# Patient Record
Sex: FEMALE | Race: BLACK OR AFRICAN AMERICAN | Employment: OTHER | URBAN - METROPOLITAN AREA
[De-identification: names, ages, dates, MRNs, and addresses within clinical notes are randomized per-mention and may not be internally consistent; named-entity substitution may affect disease eponyms.]

---

## 2018-05-15 ENCOUNTER — HOSPITAL ENCOUNTER (OUTPATIENT)
Dept: CT IMAGING | Age: 78
Discharge: HOME | End: 2018-05-17
Payer: COMMERCIAL

## 2018-05-15 DIAGNOSIS — C50.911 MALIGNANT NEOPLASM OF RIGHT BREAST IN FEMALE, ESTROGEN RECEPTOR POSITIVE, UNSPECIFIED SITE OF BREAST (HCC): ICD-10-CM

## 2018-05-15 DIAGNOSIS — Z17.0 MALIGNANT NEOPLASM OF RIGHT BREAST IN FEMALE, ESTROGEN RECEPTOR POSITIVE, UNSPECIFIED SITE OF BREAST (HCC): ICD-10-CM

## 2018-05-15 PROCEDURE — 71250 CT THORAX DX C-: CPT

## 2018-05-18 ENCOUNTER — HOSPITAL ENCOUNTER (OUTPATIENT)
Dept: AUDIOLOGY | Age: 78
Discharge: HOME | End: 2018-05-18
Payer: COMMERCIAL

## 2018-05-18 PROCEDURE — 92557 COMPREHENSIVE HEARING TEST: CPT | Performed by: AUDIOLOGIST

## 2018-05-18 PROCEDURE — 92567 TYMPANOMETRY: CPT | Performed by: AUDIOLOGIST

## 2018-06-08 ENCOUNTER — HOSPITAL ENCOUNTER (OUTPATIENT)
Age: 78
Discharge: HOME | End: 2018-06-08
Payer: COMMERCIAL

## 2018-06-08 LAB
ANION GAP SERPL CALCULATED.3IONS-SCNC: 13 MMOL/L (ref 7–16)
ANISOCYTOSIS: ABNORMAL
BASOPHILS ABSOLUTE: 0.14 E9/L (ref 0–0.2)
BASOPHILS RELATIVE PERCENT: 3.5 % (ref 0–2)
BUN BLDV-MCNC: 15 MG/DL (ref 8–23)
CALCIUM SERPL-MCNC: 9.3 MG/DL (ref 8.6–10.2)
CHLORIDE BLD-SCNC: 98 MMOL/L (ref 98–107)
CHOLESTEROL, TOTAL: 105 MG/DL (ref 0–199)
CO2: 28 MMOL/L (ref 22–29)
CREAT SERPL-MCNC: 1.4 MG/DL (ref 0.5–1)
EOSINOPHILS ABSOLUTE: 0.53 E9/L (ref 0.05–0.5)
EOSINOPHILS RELATIVE PERCENT: 13.2 % (ref 0–6)
GFR AFRICAN AMERICAN: 44
GFR NON-AFRICAN AMERICAN: 44 ML/MIN/1.73
GLUCOSE BLD-MCNC: 110 MG/DL (ref 74–109)
HBA1C MFR BLD: 5.3 % (ref 4.8–5.9)
HCT VFR BLD CALC: 34.4 % (ref 34–48)
HDLC SERPL-MCNC: 34 MG/DL
HEMOGLOBIN: 11.5 G/DL (ref 11.5–15.5)
LDL CHOLESTEROL CALCULATED: 42 MG/DL (ref 0–99)
LYMPHOCYTES ABSOLUTE: 0.52 E9/L (ref 1.5–4)
LYMPHOCYTES RELATIVE PERCENT: 13.2 % (ref 20–42)
MCH RBC QN AUTO: 31.3 PG (ref 26–35)
MCHC RBC AUTO-ENTMCNC: 33.4 % (ref 32–34.5)
MCV RBC AUTO: 93.7 FL (ref 80–99.9)
MONOCYTES ABSOLUTE: 0.24 E9/L (ref 0.1–0.95)
MONOCYTES RELATIVE PERCENT: 6.1 % (ref 2–12)
NEUTROPHILS ABSOLUTE: 2.56 E9/L (ref 1.8–7.3)
NEUTROPHILS RELATIVE PERCENT: 64 % (ref 43–80)
PARATHYROID HORMONE INTACT: 34 PG/ML (ref 15–65)
PDW BLD-RTO: 16.7 FL (ref 11.5–15)
PHOSPHORUS: 3.7 MG/DL (ref 2.5–4.5)
PLATELET # BLD: 247 E9/L (ref 130–450)
PMV BLD AUTO: 9.8 FL (ref 7–12)
POLYCHROMASIA: ABNORMAL
POTASSIUM SERPL-SCNC: 4.6 MMOL/L (ref 3.5–5)
RBC # BLD: 3.67 E12/L (ref 3.5–5.5)
SODIUM BLD-SCNC: 139 MMOL/L (ref 132–146)
TRIGL SERPL-MCNC: 145 MG/DL (ref 0–149)
VITAMIN D 25-HYDROXY: 30 NG/ML (ref 30–100)
VLDLC SERPL CALC-MCNC: 29 MG/DL
WBC # BLD: 4 E9/L (ref 4.5–11.5)

## 2018-06-08 PROCEDURE — 36415 COLL VENOUS BLD VENIPUNCTURE: CPT

## 2018-06-08 PROCEDURE — 83036 HEMOGLOBIN GLYCOSYLATED A1C: CPT

## 2018-06-08 PROCEDURE — 80061 LIPID PANEL: CPT

## 2018-06-08 PROCEDURE — 80048 BASIC METABOLIC PNL TOTAL CA: CPT

## 2018-06-08 PROCEDURE — 83970 ASSAY OF PARATHORMONE: CPT

## 2018-06-08 PROCEDURE — 82306 VITAMIN D 25 HYDROXY: CPT

## 2018-06-08 PROCEDURE — 84100 ASSAY OF PHOSPHORUS: CPT

## 2018-06-08 PROCEDURE — 85025 COMPLETE CBC W/AUTO DIFF WBC: CPT

## 2018-07-06 ENCOUNTER — HOSPITAL ENCOUNTER (OUTPATIENT)
Dept: AUDIOLOGY | Age: 78
Discharge: HOME | End: 2018-07-06
Payer: COMMERCIAL

## 2018-07-06 PROCEDURE — V5160 DISPENSING FEE BINAURAL: HCPCS | Performed by: AUDIOLOGIST

## 2018-07-06 PROCEDURE — V5261 HEARING AID, DIGIT, BIN, BTE: HCPCS | Performed by: AUDIOLOGIST

## 2018-07-08 ENCOUNTER — HOSPITAL ENCOUNTER (OUTPATIENT)
Age: 78
Discharge: HOME | End: 2018-07-10
Payer: COMMERCIAL

## 2018-07-08 ENCOUNTER — HOSPITAL ENCOUNTER (OUTPATIENT)
Dept: GENERAL RADIOLOGY | Age: 78
Discharge: HOME | End: 2018-07-10
Payer: COMMERCIAL

## 2018-07-08 DIAGNOSIS — R52 PAIN: ICD-10-CM

## 2018-07-08 PROCEDURE — 73564 X-RAY EXAM KNEE 4 OR MORE: CPT

## 2018-10-30 ENCOUNTER — HOSPITAL ENCOUNTER (OUTPATIENT)
Dept: GENERAL RADIOLOGY | Age: 78
Discharge: HOME | End: 2018-11-01
Payer: COMMERCIAL

## 2018-10-30 DIAGNOSIS — Z85.3 HX OF BREAST CANCER: ICD-10-CM

## 2018-10-30 PROCEDURE — G0279 TOMOSYNTHESIS, MAMMO: HCPCS

## 2018-11-29 ENCOUNTER — HOSPITAL ENCOUNTER (OUTPATIENT)
Age: 78
Discharge: HOME | End: 2018-11-29
Payer: COMMERCIAL

## 2018-11-29 LAB
ANION GAP SERPL CALCULATED.3IONS-SCNC: 10 MMOL/L (ref 7–16)
ANISOCYTOSIS: ABNORMAL
ATYPICAL LYMPHOCYTE RELATIVE PERCENT: 0.9 % (ref 0–4)
BASOPHILS ABSOLUTE: 0.06 E9/L (ref 0–0.2)
BASOPHILS RELATIVE PERCENT: 1.7 % (ref 0–2)
BUN BLDV-MCNC: 19 MG/DL (ref 8–23)
CALCIUM SERPL-MCNC: 9.1 MG/DL (ref 8.6–10.2)
CHLORIDE BLD-SCNC: 101 MMOL/L (ref 98–107)
CO2: 29 MMOL/L (ref 22–29)
CREAT SERPL-MCNC: 1.2 MG/DL (ref 0.5–1)
EOSINOPHILS ABSOLUTE: 0.26 E9/L (ref 0.05–0.5)
EOSINOPHILS RELATIVE PERCENT: 7.8 % (ref 0–6)
GFR AFRICAN AMERICAN: 53
GFR NON-AFRICAN AMERICAN: 53 ML/MIN/1.73
GLUCOSE BLD-MCNC: 55 MG/DL (ref 74–99)
HCT VFR BLD CALC: 35.4 % (ref 34–48)
HEMOGLOBIN: 11.6 G/DL (ref 11.5–15.5)
LYMPHOCYTES ABSOLUTE: 0.96 E9/L (ref 1.5–4)
LYMPHOCYTES RELATIVE PERCENT: 27.8 % (ref 20–42)
MCH RBC QN AUTO: 31.6 PG (ref 26–35)
MCHC RBC AUTO-ENTMCNC: 32.8 % (ref 32–34.5)
MCV RBC AUTO: 96.5 FL (ref 80–99.9)
METAMYELOCYTES RELATIVE PERCENT: 0.9 % (ref 0–1)
MONOCYTES ABSOLUTE: 0.2 E9/L (ref 0.1–0.95)
MONOCYTES RELATIVE PERCENT: 6.1 % (ref 2–12)
NEUTROPHILS ABSOLUTE: 1.85 E9/L (ref 1.8–7.3)
NEUTROPHILS RELATIVE PERCENT: 54.8 % (ref 43–80)
PDW BLD-RTO: 15.3 FL (ref 11.5–15)
PHOSPHORUS: 3.8 MG/DL (ref 2.5–4.5)
PLATELET # BLD: 310 E9/L (ref 130–450)
PMV BLD AUTO: 10.2 FL (ref 7–12)
POLYCHROMASIA: ABNORMAL
POTASSIUM SERPL-SCNC: 4.5 MMOL/L (ref 3.5–5)
RBC # BLD: 3.67 E12/L (ref 3.5–5.5)
SODIUM BLD-SCNC: 140 MMOL/L (ref 132–146)
WBC # BLD: 3.3 E9/L (ref 4.5–11.5)

## 2018-11-29 PROCEDURE — 80048 BASIC METABOLIC PNL TOTAL CA: CPT

## 2018-11-29 PROCEDURE — 84100 ASSAY OF PHOSPHORUS: CPT

## 2018-11-29 PROCEDURE — 36415 COLL VENOUS BLD VENIPUNCTURE: CPT

## 2018-11-29 PROCEDURE — 85025 COMPLETE CBC W/AUTO DIFF WBC: CPT

## 2019-05-16 ENCOUNTER — HOSPITAL ENCOUNTER (OUTPATIENT)
Dept: CT IMAGING | Age: 79
Discharge: HOME | End: 2019-05-18
Payer: COMMERCIAL

## 2019-05-16 DIAGNOSIS — C50.911 MALIGNANT NEOPLASM OF RIGHT FEMALE BREAST, UNSPECIFIED ESTROGEN RECEPTOR STATUS, UNSPECIFIED SITE OF BREAST (HCC): ICD-10-CM

## 2019-05-16 PROCEDURE — 71250 CT THORAX DX C-: CPT

## 2019-06-26 ENCOUNTER — APPOINTMENT (OUTPATIENT)
Dept: GENERAL RADIOLOGY | Age: 79
DRG: 638 | End: 2019-06-26
Payer: COMMERCIAL

## 2019-06-26 ENCOUNTER — HOSPITAL ENCOUNTER (INPATIENT)
Age: 79
LOS: 1 days | Discharge: HOME | DRG: 638 | End: 2019-06-28
Attending: EMERGENCY MEDICINE | Admitting: INTERNAL MEDICINE
Payer: COMMERCIAL

## 2019-06-26 DIAGNOSIS — S70.02XA CONTUSION OF LEFT HIP, INITIAL ENCOUNTER: ICD-10-CM

## 2019-06-26 DIAGNOSIS — R09.02 HYPOXIA: ICD-10-CM

## 2019-06-26 DIAGNOSIS — E16.2 HYPOGLYCEMIA: Primary | ICD-10-CM

## 2019-06-26 LAB
ALBUMIN SERPL-MCNC: 3.4 G/DL (ref 3.5–5.2)
ALP BLD-CCNC: 78 U/L (ref 35–104)
ALT SERPL-CCNC: 16 U/L (ref 0–32)
ANION GAP SERPL CALCULATED.3IONS-SCNC: 9 MMOL/L (ref 7–16)
AST SERPL-CCNC: 23 U/L (ref 0–31)
BILIRUB SERPL-MCNC: 0.3 MG/DL (ref 0–1.2)
BUN BLDV-MCNC: 16 MG/DL (ref 8–23)
CALCIUM SERPL-MCNC: 9.1 MG/DL (ref 8.6–10.2)
CHLORIDE BLD-SCNC: 102 MMOL/L (ref 98–107)
CO2: 31 MMOL/L (ref 22–29)
CREAT SERPL-MCNC: 1.2 MG/DL (ref 0.5–1)
GFR AFRICAN AMERICAN: 53
GFR NON-AFRICAN AMERICAN: 53 ML/MIN/1.73
GLUCOSE BLD-MCNC: 129 MG/DL (ref 74–99)
HCT VFR BLD CALC: 32.3 % (ref 34–48)
HEMOGLOBIN: 10.4 G/DL (ref 11.5–15.5)
INR BLD: 1.2
MCH RBC QN AUTO: 32 PG (ref 26–35)
MCHC RBC AUTO-ENTMCNC: 32.2 % (ref 32–34.5)
MCV RBC AUTO: 99.4 FL (ref 80–99.9)
PDW BLD-RTO: 17.8 FL (ref 11.5–15)
PLATELET # BLD: 103 E9/L (ref 130–450)
PMV BLD AUTO: 10.3 FL (ref 7–12)
POTASSIUM SERPL-SCNC: 4.1 MMOL/L (ref 3.5–5)
PROTHROMBIN TIME: 14.1 SEC (ref 9.3–12.4)
RBC # BLD: 3.25 E12/L (ref 3.5–5.5)
SODIUM BLD-SCNC: 142 MMOL/L (ref 132–146)
TOTAL PROTEIN: 6.6 G/DL (ref 6.4–8.3)
TROPONIN: <0.01 NG/ML (ref 0–0.03)
WBC # BLD: 3.1 E9/L (ref 4.5–11.5)

## 2019-06-26 PROCEDURE — 93005 ELECTROCARDIOGRAM TRACING: CPT | Performed by: EMERGENCY MEDICINE

## 2019-06-26 PROCEDURE — 99285 EMERGENCY DEPT VISIT HI MDM: CPT

## 2019-06-26 PROCEDURE — 85027 COMPLETE CBC AUTOMATED: CPT

## 2019-06-26 PROCEDURE — 73564 X-RAY EXAM KNEE 4 OR MORE: CPT

## 2019-06-26 PROCEDURE — 83880 ASSAY OF NATRIURETIC PEPTIDE: CPT

## 2019-06-26 PROCEDURE — 71045 X-RAY EXAM CHEST 1 VIEW: CPT

## 2019-06-26 PROCEDURE — 73502 X-RAY EXAM HIP UNI 2-3 VIEWS: CPT

## 2019-06-26 PROCEDURE — 80053 COMPREHEN METABOLIC PANEL: CPT

## 2019-06-26 PROCEDURE — 36415 COLL VENOUS BLD VENIPUNCTURE: CPT

## 2019-06-26 PROCEDURE — 84484 ASSAY OF TROPONIN QUANT: CPT

## 2019-06-26 PROCEDURE — 2580000003 HC RX 258: Performed by: EMERGENCY MEDICINE

## 2019-06-26 PROCEDURE — 85610 PROTHROMBIN TIME: CPT

## 2019-06-26 RX ORDER — 0.9 % SODIUM CHLORIDE 0.9 %
1000 INTRAVENOUS SOLUTION INTRAVENOUS ONCE
Status: COMPLETED | OUTPATIENT
Start: 2019-06-26 | End: 2019-06-26

## 2019-06-26 RX ADMIN — SODIUM CHLORIDE 1000 ML: 9 INJECTION, SOLUTION INTRAVENOUS at 20:30

## 2019-06-26 ASSESSMENT — PAIN DESCRIPTION - LOCATION: LOCATION: GENERALIZED

## 2019-06-26 ASSESSMENT — PAIN DESCRIPTION - PAIN TYPE: TYPE: CHRONIC PAIN

## 2019-06-27 ENCOUNTER — APPOINTMENT (OUTPATIENT)
Dept: CT IMAGING | Age: 79
DRG: 638 | End: 2019-06-27
Payer: COMMERCIAL

## 2019-06-27 PROBLEM — R09.02 HYPOXIA: Status: ACTIVE | Noted: 2019-06-27

## 2019-06-27 PROBLEM — J44.9 COPD (CHRONIC OBSTRUCTIVE PULMONARY DISEASE) (HCC): Status: ACTIVE | Noted: 2019-06-27

## 2019-06-27 LAB
BACTERIA: ABNORMAL /HPF
BILIRUBIN URINE: NEGATIVE
BLOOD, URINE: NEGATIVE
CHP ED QC CHECK: NORMAL
CLARITY: CLEAR
COLOR: YELLOW
EKG ATRIAL RATE: 64 BPM
EKG P AXIS: 74 DEGREES
EKG P-R INTERVAL: 148 MS
EKG Q-T INTERVAL: 464 MS
EKG QRS DURATION: 82 MS
EKG QTC CALCULATION (BAZETT): 478 MS
EKG R AXIS: 61 DEGREES
EKG T AXIS: 78 DEGREES
EKG VENTRICULAR RATE: 64 BPM
GLUCOSE BLD-MCNC: 149 MG/DL
GLUCOSE URINE: NEGATIVE MG/DL
HBA1C MFR BLD: 5.4 % (ref 4–5.6)
HBA1C MFR BLD: 5.6 % (ref 4–5.6)
KETONES, URINE: NEGATIVE MG/DL
LEUKOCYTE ESTERASE, URINE: ABNORMAL
METER GLUCOSE: 149 MG/DL (ref 74–99)
METER GLUCOSE: 171 MG/DL (ref 74–99)
METER GLUCOSE: 209 MG/DL (ref 74–99)
METER GLUCOSE: 219 MG/DL (ref 74–99)
METER GLUCOSE: 252 MG/DL (ref 74–99)
METER GLUCOSE: 276 MG/DL (ref 74–99)
NITRITE, URINE: NEGATIVE
PH UA: 6 (ref 5–9)
PRO-BNP: 171 PG/ML (ref 0–450)
PRO-BNP: 82 PG/ML (ref 0–450)
PROTEIN UA: NEGATIVE MG/DL
RBC UA: ABNORMAL /HPF (ref 0–2)
SPECIFIC GRAVITY UA: <=1.005 (ref 1–1.03)
UROBILINOGEN, URINE: 0.2 E.U./DL
WBC UA: ABNORMAL /HPF (ref 0–5)

## 2019-06-27 PROCEDURE — 36415 COLL VENOUS BLD VENIPUNCTURE: CPT

## 2019-06-27 PROCEDURE — 6370000000 HC RX 637 (ALT 250 FOR IP): Performed by: EMERGENCY MEDICINE

## 2019-06-27 PROCEDURE — 93010 ELECTROCARDIOGRAM REPORT: CPT | Performed by: INTERNAL MEDICINE

## 2019-06-27 PROCEDURE — 2580000003 HC RX 258: Performed by: RADIOLOGY

## 2019-06-27 PROCEDURE — 6360000002 HC RX W HCPCS: Performed by: INTERNAL MEDICINE

## 2019-06-27 PROCEDURE — 82962 GLUCOSE BLOOD TEST: CPT

## 2019-06-27 PROCEDURE — 6370000000 HC RX 637 (ALT 250 FOR IP): Performed by: INTERNAL MEDICINE

## 2019-06-27 PROCEDURE — 81001 URINALYSIS AUTO W/SCOPE: CPT

## 2019-06-27 PROCEDURE — 97166 OT EVAL MOD COMPLEX 45 MIN: CPT

## 2019-06-27 PROCEDURE — 83880 ASSAY OF NATRIURETIC PEPTIDE: CPT

## 2019-06-27 PROCEDURE — 97535 SELF CARE MNGMENT TRAINING: CPT

## 2019-06-27 PROCEDURE — 94640 AIRWAY INHALATION TREATMENT: CPT

## 2019-06-27 PROCEDURE — 71275 CT ANGIOGRAPHY CHEST: CPT

## 2019-06-27 PROCEDURE — 94664 DEMO&/EVAL PT USE INHALER: CPT

## 2019-06-27 PROCEDURE — 2580000003 HC RX 258: Performed by: INTERNAL MEDICINE

## 2019-06-27 PROCEDURE — 6360000004 HC RX CONTRAST MEDICATION: Performed by: RADIOLOGY

## 2019-06-27 PROCEDURE — 97530 THERAPEUTIC ACTIVITIES: CPT

## 2019-06-27 PROCEDURE — 2700000000 HC OXYGEN THERAPY PER DAY

## 2019-06-27 PROCEDURE — 97162 PT EVAL MOD COMPLEX 30 MIN: CPT

## 2019-06-27 PROCEDURE — 2140000000 HC CCU INTERMEDIATE R&B

## 2019-06-27 PROCEDURE — 83036 HEMOGLOBIN GLYCOSYLATED A1C: CPT

## 2019-06-27 RX ORDER — BUDESONIDE 0.5 MG/2ML
INHALANT ORAL
Status: ON HOLD | COMMUNITY
End: 2019-06-28 | Stop reason: HOSPADM

## 2019-06-27 RX ORDER — ALBUTEROL SULFATE 2.5 MG/3ML
2.5 SOLUTION RESPIRATORY (INHALATION) 4 TIMES DAILY
Status: DISCONTINUED | OUTPATIENT
Start: 2019-06-27 | End: 2019-06-27

## 2019-06-27 RX ORDER — ACETAMINOPHEN 325 MG/1
650 TABLET ORAL EVERY 4 HOURS PRN
Status: DISCONTINUED | OUTPATIENT
Start: 2019-06-27 | End: 2019-06-28 | Stop reason: HOSPADM

## 2019-06-27 RX ORDER — MINOCYCLINE HYDROCHLORIDE 50 MG/1
100 CAPSULE ORAL 2 TIMES DAILY
Status: DISCONTINUED | OUTPATIENT
Start: 2019-06-27 | End: 2019-06-28 | Stop reason: HOSPADM

## 2019-06-27 RX ORDER — MINOCYCLINE HYDROCHLORIDE 100 MG/1
100 CAPSULE ORAL 2 TIMES DAILY
Status: ON HOLD | COMMUNITY
Start: 2012-12-18 | End: 2021-01-01 | Stop reason: HOSPADM

## 2019-06-27 RX ORDER — NICOTINE POLACRILEX 4 MG
15 LOZENGE BUCCAL PRN
Status: DISCONTINUED | OUTPATIENT
Start: 2019-06-27 | End: 2019-06-27 | Stop reason: SDUPTHER

## 2019-06-27 RX ORDER — DEXTROSE MONOHYDRATE 50 MG/ML
100 INJECTION, SOLUTION INTRAVENOUS PRN
Status: DISCONTINUED | OUTPATIENT
Start: 2019-06-27 | End: 2019-06-27 | Stop reason: SDUPTHER

## 2019-06-27 RX ORDER — PANTOPRAZOLE SODIUM 40 MG/1
40 TABLET, DELAYED RELEASE ORAL DAILY
Status: DISCONTINUED | OUTPATIENT
Start: 2019-06-27 | End: 2019-06-28 | Stop reason: HOSPADM

## 2019-06-27 RX ORDER — SODIUM CHLORIDE 0.9 % (FLUSH) 0.9 %
10 SYRINGE (ML) INJECTION EVERY 12 HOURS SCHEDULED
Status: DISCONTINUED | OUTPATIENT
Start: 2019-06-27 | End: 2019-06-28 | Stop reason: HOSPADM

## 2019-06-27 RX ORDER — GABAPENTIN 400 MG/1
CAPSULE ORAL
Refills: 0 | COMMUNITY
Start: 2019-05-30

## 2019-06-27 RX ORDER — DEXTROSE MONOHYDRATE 25 G/50ML
12.5 INJECTION, SOLUTION INTRAVENOUS PRN
Status: DISCONTINUED | OUTPATIENT
Start: 2019-06-27 | End: 2019-06-27 | Stop reason: SDUPTHER

## 2019-06-27 RX ORDER — LANSOPRAZOLE 30 MG/1
CAPSULE, DELAYED RELEASE ORAL
COMMUNITY
End: 2019-07-31

## 2019-06-27 RX ORDER — GABAPENTIN 300 MG/1
300 CAPSULE ORAL 2 TIMES DAILY
Status: DISCONTINUED | OUTPATIENT
Start: 2019-06-27 | End: 2019-06-28 | Stop reason: HOSPADM

## 2019-06-27 RX ORDER — SODIUM CHLORIDE 0.9 % (FLUSH) 0.9 %
10 SYRINGE (ML) INJECTION PRN
Status: COMPLETED | OUTPATIENT
Start: 2019-06-27 | End: 2019-06-27

## 2019-06-27 RX ORDER — NICOTINE POLACRILEX 4 MG
15 LOZENGE BUCCAL PRN
Status: DISCONTINUED | OUTPATIENT
Start: 2019-06-27 | End: 2019-06-28 | Stop reason: HOSPADM

## 2019-06-27 RX ORDER — FORMOTEROL FUMARATE 20 UG/2ML
20 SOLUTION RESPIRATORY (INHALATION) EVERY 12 HOURS
Status: DISCONTINUED | OUTPATIENT
Start: 2019-06-27 | End: 2019-06-27

## 2019-06-27 RX ORDER — SODIUM CHLORIDE 0.9 % (FLUSH) 0.9 %
10 SYRINGE (ML) INJECTION PRN
Status: DISCONTINUED | OUTPATIENT
Start: 2019-06-27 | End: 2019-06-28 | Stop reason: HOSPADM

## 2019-06-27 RX ORDER — ALBUTEROL SULFATE 90 UG/1
2 AEROSOL, METERED RESPIRATORY (INHALATION) EVERY 6 HOURS PRN
Status: DISCONTINUED | OUTPATIENT
Start: 2019-06-27 | End: 2019-06-28 | Stop reason: HOSPADM

## 2019-06-27 RX ORDER — LATANOPROST 50 UG/ML
1 SOLUTION/ DROPS OPHTHALMIC EVERY EVENING
Status: DISCONTINUED | OUTPATIENT
Start: 2019-06-27 | End: 2019-06-27

## 2019-06-27 RX ORDER — BUDESONIDE 0.5 MG/2ML
500 INHALANT ORAL 2 TIMES DAILY
Status: DISCONTINUED | OUTPATIENT
Start: 2019-06-27 | End: 2019-06-27

## 2019-06-27 RX ORDER — BUDESONIDE 0.25 MG/2ML
INHALANT ORAL
Status: ON HOLD | COMMUNITY
Start: 2011-02-24 | End: 2019-06-28 | Stop reason: HOSPADM

## 2019-06-27 RX ORDER — IPRATROPIUM BROMIDE AND ALBUTEROL SULFATE 2.5; .5 MG/3ML; MG/3ML
1 SOLUTION RESPIRATORY (INHALATION)
Status: DISPENSED | OUTPATIENT
Start: 2019-06-27 | End: 2019-06-27

## 2019-06-27 RX ORDER — IPRATROPIUM BROMIDE AND ALBUTEROL SULFATE 2.5; .5 MG/3ML; MG/3ML
1 SOLUTION RESPIRATORY (INHALATION)
Status: DISCONTINUED | OUTPATIENT
Start: 2019-06-27 | End: 2019-06-28 | Stop reason: HOSPADM

## 2019-06-27 RX ORDER — CLOBETASOL PROPIONATE 0.5 MG/G
CREAM TOPICAL
COMMUNITY

## 2019-06-27 RX ORDER — BRIMONIDINE TARTRATE 2 MG/ML
1 SOLUTION/ DROPS OPHTHALMIC EVERY MORNING
Status: DISCONTINUED | OUTPATIENT
Start: 2019-06-27 | End: 2019-06-27

## 2019-06-27 RX ORDER — POLYVINYL ALCOHOL 14 MG/ML
1 SOLUTION/ DROPS OPHTHALMIC 3 TIMES DAILY
Status: DISCONTINUED | OUTPATIENT
Start: 2019-06-27 | End: 2019-06-28 | Stop reason: HOSPADM

## 2019-06-27 RX ORDER — SODIUM CHLORIDE 9 MG/ML
INJECTION, SOLUTION INTRAVENOUS CONTINUOUS
Status: DISCONTINUED | OUTPATIENT
Start: 2019-06-27 | End: 2019-06-28

## 2019-06-27 RX ORDER — OFLOXACIN 3 MG/ML
1 SOLUTION/ DROPS OPHTHALMIC
COMMUNITY
Start: 2019-03-07

## 2019-06-27 RX ORDER — DEXTROSE MONOHYDRATE 25 G/50ML
12.5 INJECTION, SOLUTION INTRAVENOUS PRN
Status: DISCONTINUED | OUTPATIENT
Start: 2019-06-27 | End: 2019-06-28 | Stop reason: HOSPADM

## 2019-06-27 RX ORDER — ONDANSETRON 2 MG/ML
4 INJECTION INTRAMUSCULAR; INTRAVENOUS EVERY 6 HOURS PRN
Status: DISCONTINUED | OUTPATIENT
Start: 2019-06-27 | End: 2019-06-28 | Stop reason: HOSPADM

## 2019-06-27 RX ORDER — DEXTROSE MONOHYDRATE 50 MG/ML
100 INJECTION, SOLUTION INTRAVENOUS PRN
Status: DISCONTINUED | OUTPATIENT
Start: 2019-06-27 | End: 2019-06-28 | Stop reason: HOSPADM

## 2019-06-27 RX ORDER — DEXAMETHASONE SODIUM PHOSPHATE 1 MG/ML
1 SOLUTION/ DROPS OPHTHALMIC
COMMUNITY
Start: 2019-03-07

## 2019-06-27 RX ORDER — IPRATROPIUM BROMIDE AND ALBUTEROL SULFATE 2.5; .5 MG/3ML; MG/3ML
1 SOLUTION RESPIRATORY (INHALATION)
Status: COMPLETED | OUTPATIENT
Start: 2019-06-27 | End: 2019-06-27

## 2019-06-27 RX ADMIN — VERAPAMIL HYDROCHLORIDE 180 MG: 180 TABLET, FILM COATED, EXTENDED RELEASE ORAL at 08:40

## 2019-06-27 RX ADMIN — Medication 10 ML: at 08:40

## 2019-06-27 RX ADMIN — INSULIN LISPRO 2 UNITS: 100 INJECTION, SOLUTION INTRAVENOUS; SUBCUTANEOUS at 12:21

## 2019-06-27 RX ADMIN — IPRATROPIUM BROMIDE AND ALBUTEROL SULFATE 1 AMPULE: .5; 3 SOLUTION RESPIRATORY (INHALATION) at 02:45

## 2019-06-27 RX ADMIN — IPRATROPIUM BROMIDE AND ALBUTEROL SULFATE 1 AMPULE: .5; 3 SOLUTION RESPIRATORY (INHALATION) at 03:00

## 2019-06-27 RX ADMIN — INSULIN LISPRO 3 UNITS: 100 INJECTION, SOLUTION INTRAVENOUS; SUBCUTANEOUS at 18:56

## 2019-06-27 RX ADMIN — IPRATROPIUM BROMIDE AND ALBUTEROL SULFATE 1 AMPULE: .5; 3 SOLUTION RESPIRATORY (INHALATION) at 02:29

## 2019-06-27 RX ADMIN — IPRATROPIUM BROMIDE AND ALBUTEROL SULFATE 1 AMPULE: .5; 3 SOLUTION RESPIRATORY (INHALATION) at 15:02

## 2019-06-27 RX ADMIN — Medication 10 ML: at 02:04

## 2019-06-27 RX ADMIN — MINOCYCLINE HYDROCHLORIDE 100 MG: 50 CAPSULE ORAL at 20:10

## 2019-06-27 RX ADMIN — PANTOPRAZOLE SODIUM 40 MG: 40 TABLET, DELAYED RELEASE ORAL at 08:40

## 2019-06-27 RX ADMIN — INSULIN LISPRO 1 UNITS: 100 INJECTION, SOLUTION INTRAVENOUS; SUBCUTANEOUS at 20:16

## 2019-06-27 RX ADMIN — GABAPENTIN 300 MG: 300 CAPSULE ORAL at 20:10

## 2019-06-27 RX ADMIN — VERAPAMIL HYDROCHLORIDE 180 MG: 180 TABLET, FILM COATED, EXTENDED RELEASE ORAL at 20:09

## 2019-06-27 RX ADMIN — ALBUTEROL SULFATE 2.5 MG: 2.5 SOLUTION RESPIRATORY (INHALATION) at 15:01

## 2019-06-27 RX ADMIN — GABAPENTIN 300 MG: 300 CAPSULE ORAL at 08:40

## 2019-06-27 RX ADMIN — MINOCYCLINE HYDROCHLORIDE 100 MG: 50 CAPSULE ORAL at 08:40

## 2019-06-27 RX ADMIN — SODIUM CHLORIDE 1 DROP: 20 SOLUTION OPHTHALMIC at 20:09

## 2019-06-27 RX ADMIN — IOPAMIDOL 60 ML: 755 INJECTION, SOLUTION INTRAVENOUS at 02:04

## 2019-06-27 RX ADMIN — SODIUM CHLORIDE: 9 INJECTION, SOLUTION INTRAVENOUS at 06:50

## 2019-06-27 RX ADMIN — MOMETASONE FUROATE AND FORMOTEROL FUMARATE DIHYDRATE 2 PUFF: 200; 5 AEROSOL RESPIRATORY (INHALATION) at 20:09

## 2019-06-27 RX ADMIN — ENOXAPARIN SODIUM 40 MG: 40 INJECTION SUBCUTANEOUS at 08:40

## 2019-06-27 RX ADMIN — SODIUM CHLORIDE: 9 INJECTION, SOLUTION INTRAVENOUS at 20:11

## 2019-06-27 RX ADMIN — IPRATROPIUM BROMIDE AND ALBUTEROL SULFATE 1 AMPULE: .5; 3 SOLUTION RESPIRATORY (INHALATION) at 20:52

## 2019-06-27 ASSESSMENT — PAIN SCALES - GENERAL
PAINLEVEL_OUTOF10: 0

## 2019-06-27 NOTE — PROGRESS NOTES
Occupational Therapy  OCCUPATIONAL THERAPY INITIAL EVALUATION      Date:2019  Patient Name: Darin Sharma  MRN: [id number]  : 1940  Room: [address] [place] [address]    Evaluating OT: Sarahi Merino OTR/L #[id number]     AM-PAC Daily Activity Raw Score:     Recommended Adaptive Equipment:  TBD     Diagnosis: Hypoxia    Patient presented to ED for hypoglycemia     Pertinent Medical History: CA, arthritis, COPD, DM, HTN, Neuropathy, CVA     Precautions:  Falls, chemo precautions, O2, continuous pulse ox     Home Living: Pt lives with daughter in a 1 story home with 1 ROBERTA and 1 hand rail   Bathroom setup: tub/shower combo with seat   Equipment owned: rollator, shower chair    Prior Level of Function: mod I with ADLs , assist prn with IADLs; ambulated with rollator;  Pt has [employer] aides 2x/wk who assist prn  Driving: no  Occupation: na    Pain Level: Pt denied pain initially then reported L LE pain with activity (did not quantify); Therapist facilitated repositioning to address pain    Cognition: A&O: 4/4; Follows 2 step directions   Memory:  good   Sequencing:  fair   Problem solving:  fair   Judgement/safety:  fair     Functional Assessment:   Initial Eval Status  Date: 19 Treatment Status  Date: Short Term Goals  Treatment frequency: PRN   Feeding Independent       Grooming Minimal Assist   Supervision    UB Dressing Stand by Assist   Supervision    LB Dressing Maximal Assist   Minimal Assist    Bathing Maximal Assist  Minimal Assist    Toileting Moderate Assist   Assist with steadying, hygiene and clothing management   Minimal Assist    Bed Mobility  Supine to sit: Moderate Assist   Sit to supine: NT  Supine to sit: Stand by Assist   Sit to supine: Stand by Assist   Functional Transfers Moderate Assist   (bed)  Minimal Assist   (chair/ toilet with grab bars)  Stand by Assist    Functional Mobility Moderate Assist   Ambulated to/from bathroom with w/w - cuing on posture, w/w management and safety.   Pt leaning to L side- required therapist assist to correct LOBs (+ SOB; 93%)  Contact Guard Assist    Balance Sitting:     Static:  Supervision    Dynamic:SBA  Standing: min A     Activity Tolerance P+  (+ SOB with minimal activity)  F+   Visual/  Perceptual Glasses: yes  wfl                  Hand dominance: right      Strength ROM Additional Info:    RUE   4-/5 wfl good  and wfl FMC/dexterity noted during ADL tasks     LUE 4-/5 wfl good  and wfl FMC/dexterity noted during ADL tasks     Hearing: wfl  Sensation: pt reports neuropathy B UE/LE  Tone: wfl  Edema:none noted                             Comments/Treatment: Upon arrival, patient supine in bed and agreeable to OT Session with PT collaboration. Therapist facilitated bed mobility, unsupported sitting balance,  functional transfers (various surfaces; bed, chair, toilet:  Sit to stands / stand pivots), standing tolerance tasks (addressing posture, balance and activity tolerance) and functional ambulation task with w/w to/from bathroom (+ SOB and unsteadiness (L lateral lean / LOBs); cuing on posture, w/w management and safety) - skilled cuing on hand placement, posture, body mechanics and safety. Therapist facilitated self-care retraining: UB/LB self-care tasks (robe, socks), toileting task (all aspect; steadying assist and assist with hygiene / clothing management) and grooming task while educating pt on modified techniques, posture, safety and energy conservation techniques. Skilled monitoring of HR, O2 sats and pts response to treatment (+ SOB; 93% throughout session - cuing on pursed lip breathing). Pt demonstrating fair understanding of education/techniques, requiring additional training / education. At end of session, patient seated in chair with call light and phone within reach, all lines intact. Pt would benefit from continued skilled OT to increase functional independence and quality of life.     Eval Complexity: mod  Profile and History- med (extensive chart

## 2019-06-27 NOTE — CONSULTS
BUN 16   CREATININE 1.2*    ALB:3,BILIDIR:3,BILITOT:3,ALKPHOS:3)@    PT/INR:   Recent Labs     06/26/19 2116   PROTIME 14.1*   INR 1.2       ABG:   No results for input(s): PH, PO2, PCO2, HCO3, BE, O2SAT, METHB, O2HB, COHB, O2CON, HHB, THB in the last 72 hours. Radiology Review:  CT chest reviewed with emphysema luke bilateral upper lobes    IMPRESSION/RECOMMENDATIONS:      Fall due to hypoglycemia  Sob (?) COPD  Hypoxia    1. Can recheck oxygenation at rest and ambulation tomorrow, told will need to go home with oxygen supplementation if qualifies  2. Will switch pulmicort and perforomist to dulera, cont with crista, will see if can be sent home on this regimen or will have to be on all neb treatments  3. Watch fluid balance, diurese as needed  4. Ambulate with assistance, observe if weakness worse or better. 5. Will need PFT and ffup as out-pt            Thanks for letting us see this patient in consultation. Please contact us with any questions. Office [phone] or after hours through [employer], x [phone].

## 2019-06-27 NOTE — ED PROVIDER NOTES
Rubbing alcohol [alc-cynara scolymus]; Penicillins; and Sulfa antibiotics    ---------------------------------------------------PHYSICAL EXAM--------------------------------------    Constitutional/General: Alert and oriented x3, well appearing, non toxic in NAD  Head: Normocephalic and atraumatic  Eyes: PERRL, EOMI  Mouth: Oropharynx clear, handling secretions, no trismus  Neck: Supple, full ROM, non tender to palpation in the midline, no stridor, no crepitus, no meningeal signs  Pulmonary: Lungs clear to auscultation bilaterally, no wheezes, rales, or rhonchi. Not in respiratory distress  Cardiovascular:  Regular rate. Regular rhythm. No murmurs, gallops, or rubs. 2+ distal pulses  Chest: no chest wall tenderness  Abdomen: Soft. Non tender. Non distended. +BS. No rebound, guarding, or rigidity. No pulsatile masses appreciated. Musculoskeletal: Moves all extremities x 4. Tender to left hip and knee limited secondary to pain no back tenderness . warm and well perfused, no clubbing, cyanosis, or edema. Capillary refill <3 seconds  Skin: warm and dry. No rashes. Neurologic: GCS 15, CN 2-12 grossly intact, no focal deficits, symmetric strength 5/5 in the upper and lower extremities bilaterally  Psych: Normal Affect    -------------------------------------------------- RESULTS -------------------------------------------------  I have personally reviewed all laboratory and imaging results for this patient. Results are listed below.      LABS:  Results for orders placed or performed during the hospital encounter of 06/26/19   CBC   Result Value Ref Range    WBC 3.1 (L) 4.5 - 11.5 E9/L    RBC 3.25 (L) 3.50 - 5.50 E12/L    Hemoglobin 10.4 (L) 11.5 - 15.5 g/dL    Hematocrit 32.3 (L) 34.0 - 48.0 %    MCV 99.4 80.0 - 99.9 fL    MCH 32.0 26.0 - 35.0 pg    MCHC 32.2 32.0 - 34.5 %    RDW 17.8 (H) 11.5 - 15.0 fL    Platelets 175 (L) 282 - 450 E9/L    MPV 10.3 7.0 - 12.0 fL   Comprehensive Metabolic Panel   Result Value Ref Range    Sodium 142 132 - 146 mmol/L    Potassium 4.1 3.5 - 5.0 mmol/L    Chloride 102 98 - 107 mmol/L    CO2 31 (H) 22 - 29 mmol/L    Anion Gap 9 7 - 16 mmol/L    Glucose 129 (H) 74 - 99 mg/dL    BUN 16 8 - 23 mg/dL    CREATININE 1.2 (H) 0.5 - 1.0 mg/dL    GFR Non-African American 53 >=60 mL/min/1.73    GFR African American 53     Calcium 9.1 8.6 - 10.2 mg/dL    Total Protein 6.6 6.4 - 8.3 g/dL    Alb 3.4 (L) 3.5 - 5.2 g/dL    Total Bilirubin 0.3 0.0 - 1.2 mg/dL    Alkaline Phosphatase 78 35 - 104 U/L    ALT 16 0 - 32 U/L    AST 23 0 - 31 U/L   Troponin   Result Value Ref Range    Troponin <0.01 0.00 - 0.03 ng/mL   Urinalysis   Result Value Ref Range    Color, UA Yellow Straw/Yellow    Clarity, UA Clear Clear    Glucose, Ur Negative Negative mg/dL    Bilirubin Urine Negative Negative    Ketones, Urine Negative Negative mg/dL    Specific Gravity, UA <=1.005 1.005 - 1.030    Blood, Urine Negative Negative    pH, UA 6.0 5.0 - 9.0    Protein, UA Negative Negative mg/dL    Urobilinogen, Urine 0.2 <2.0 E.U./dL    Nitrite, Urine Negative Negative    Leukocyte Esterase, Urine TRACE (A) Negative   Protime-INR   Result Value Ref Range    Protime 14.1 (H) 9.3 - 12.4 sec    INR 1.2    Microscopic Urinalysis   Result Value Ref Range    WBC, UA 2-5 0 - 5 /HPF    RBC, UA 0-1 0 - 2 /HPF    Bacteria, UA RARE (A) /HPF   Brain Natriuretic Peptide   Result Value Ref Range    Pro-BNP 82 0 - 450 pg/mL   POCT glucose   Result Value Ref Range    Glucose 149 mg/dL    QC OK? t    POCT Glucose   Result Value Ref Range    Meter Glucose 149 (H) 74 - 99 mg/dL   EKG 12 Lead   Result Value Ref Range    Ventricular Rate 64 BPM    Atrial Rate 64 BPM    P-R Interval 148 ms    QRS Duration 82 ms    Q-T Interval 464 ms    QTc Calculation (Bazett) 478 ms    P Axis 74 degrees    R Axis 61 degrees    T Axis 78 degrees       RADIOLOGY:  Interpreted by Radiologist.  CTA CHEST W CONTRAST   Final Result   1.  There is no evidence for pulmonary embolic

## 2019-06-28 VITALS
HEART RATE: 80 BPM | WEIGHT: 164.4 LBS | OXYGEN SATURATION: 100 % | BODY MASS INDEX: 29.13 KG/M2 | RESPIRATION RATE: 18 BRPM | DIASTOLIC BLOOD PRESSURE: 78 MMHG | TEMPERATURE: 97.1 F | HEIGHT: 63 IN | SYSTOLIC BLOOD PRESSURE: 166 MMHG

## 2019-06-28 PROBLEM — D61.818 PANCYTOPENIA (HCC): Status: ACTIVE | Noted: 2019-06-28

## 2019-06-28 LAB
ANION GAP SERPL CALCULATED.3IONS-SCNC: 13 MMOL/L (ref 7–16)
ANISOCYTOSIS: ABNORMAL
BASOPHILS ABSOLUTE: 0.04 E9/L (ref 0–0.2)
BASOPHILS RELATIVE PERCENT: 1.8 % (ref 0–2)
BUN BLDV-MCNC: 12 MG/DL (ref 8–23)
CALCIUM SERPL-MCNC: 8.1 MG/DL (ref 8.6–10.2)
CHLORIDE BLD-SCNC: 104 MMOL/L (ref 98–107)
CO2: 25 MMOL/L (ref 22–29)
CREAT SERPL-MCNC: 0.9 MG/DL (ref 0.5–1)
EOSINOPHILS ABSOLUTE: 0.18 E9/L (ref 0.05–0.5)
EOSINOPHILS RELATIVE PERCENT: 7.9 % (ref 0–6)
GFR AFRICAN AMERICAN: >60
GFR NON-AFRICAN AMERICAN: >60 ML/MIN/1.73
GLUCOSE BLD-MCNC: 125 MG/DL (ref 74–99)
HCT VFR BLD CALC: 26.4 % (ref 34–48)
HEMOGLOBIN: 8.8 G/DL (ref 11.5–15.5)
LYMPHOCYTES ABSOLUTE: 0.76 E9/L (ref 1.5–4)
LYMPHOCYTES RELATIVE PERCENT: 32.5 % (ref 20–42)
MCH RBC QN AUTO: 32.7 PG (ref 26–35)
MCHC RBC AUTO-ENTMCNC: 33.3 % (ref 32–34.5)
MCV RBC AUTO: 98.1 FL (ref 80–99.9)
METER GLUCOSE: 135 MG/DL (ref 74–99)
METER GLUCOSE: 152 MG/DL (ref 74–99)
MONOCYTES ABSOLUTE: 0.18 E9/L (ref 0.1–0.95)
MONOCYTES RELATIVE PERCENT: 7.9 % (ref 2–12)
NEUTROPHILS ABSOLUTE: 1.15 E9/L (ref 1.8–7.3)
NEUTROPHILS RELATIVE PERCENT: 50 % (ref 43–80)
PDW BLD-RTO: 17.6 FL (ref 11.5–15)
PLATELET # BLD: 77 E9/L (ref 130–450)
PLATELET CONFIRMATION: NORMAL
PMV BLD AUTO: 9.4 FL (ref 7–12)
POIKILOCYTES: ABNORMAL
POLYCHROMASIA: ABNORMAL
POTASSIUM REFLEX MAGNESIUM: 3.6 MMOL/L (ref 3.5–5)
RBC # BLD: 2.69 E12/L (ref 3.5–5.5)
SODIUM BLD-SCNC: 142 MMOL/L (ref 132–146)
TEAR DROP CELLS: ABNORMAL
WBC # BLD: 2.3 E9/L (ref 4.5–11.5)

## 2019-06-28 PROCEDURE — 36415 COLL VENOUS BLD VENIPUNCTURE: CPT

## 2019-06-28 PROCEDURE — 85025 COMPLETE CBC W/AUTO DIFF WBC: CPT

## 2019-06-28 PROCEDURE — 80048 BASIC METABOLIC PNL TOTAL CA: CPT

## 2019-06-28 PROCEDURE — 94640 AIRWAY INHALATION TREATMENT: CPT

## 2019-06-28 PROCEDURE — 6370000000 HC RX 637 (ALT 250 FOR IP): Performed by: INTERNAL MEDICINE

## 2019-06-28 PROCEDURE — 6360000002 HC RX W HCPCS: Performed by: INTERNAL MEDICINE

## 2019-06-28 PROCEDURE — 82962 GLUCOSE BLOOD TEST: CPT

## 2019-06-28 RX ORDER — POTASSIUM CHLORIDE 750 MG/1
20 CAPSULE, EXTENDED RELEASE ORAL 2 TIMES DAILY
COMMUNITY

## 2019-06-28 RX ORDER — GLIMEPIRIDE 1 MG/1
1 TABLET ORAL 2 TIMES DAILY WITH MEALS
Qty: 30 TABLET | Refills: 3 | Status: ON HOLD
Start: 2019-06-28 | End: 2021-01-01 | Stop reason: HOSPADM

## 2019-06-28 RX ORDER — POTASSIUM CHLORIDE 20 MEQ/1
20 TABLET, EXTENDED RELEASE ORAL 2 TIMES DAILY
Status: DISCONTINUED | OUTPATIENT
Start: 2019-06-28 | End: 2019-06-28 | Stop reason: HOSPADM

## 2019-06-28 RX ORDER — ALBUTEROL SULFATE 2.5 MG/3ML
2.5 SOLUTION RESPIRATORY (INHALATION) EVERY 6 HOURS PRN
Qty: 120 EACH | Refills: 0 | Status: SHIPPED | OUTPATIENT
Start: 2019-06-28

## 2019-06-28 RX ORDER — BACITRACIN, NEOMYCIN, POLYMYXIN B 400; 3.5; 5 [USP'U]/G; MG/G; [USP'U]/G
OINTMENT TOPICAL PRN
Status: DISCONTINUED | OUTPATIENT
Start: 2019-06-28 | End: 2019-06-28 | Stop reason: HOSPADM

## 2019-06-28 RX ADMIN — POLYVINYL ALCOHOL 1 DROP: 14 SOLUTION/ DROPS OPHTHALMIC at 09:22

## 2019-06-28 RX ADMIN — PANTOPRAZOLE SODIUM 40 MG: 40 TABLET, DELAYED RELEASE ORAL at 09:22

## 2019-06-28 RX ADMIN — GABAPENTIN 300 MG: 300 CAPSULE ORAL at 09:22

## 2019-06-28 RX ADMIN — IPRATROPIUM BROMIDE AND ALBUTEROL SULFATE 1 AMPULE: .5; 3 SOLUTION RESPIRATORY (INHALATION) at 11:50

## 2019-06-28 RX ADMIN — IPRATROPIUM BROMIDE AND ALBUTEROL SULFATE 1 AMPULE: .5; 3 SOLUTION RESPIRATORY (INHALATION) at 08:50

## 2019-06-28 RX ADMIN — MINOCYCLINE HYDROCHLORIDE 100 MG: 50 CAPSULE ORAL at 09:22

## 2019-06-28 RX ADMIN — NEOMYCIN AND POLYMYXIN B SULFATES AND BACITRACIN ZINC: 400; 3.5; 5 OINTMENT TOPICAL at 09:25

## 2019-06-28 RX ADMIN — POTASSIUM CHLORIDE 20 MEQ: 20 TABLET, EXTENDED RELEASE ORAL at 09:25

## 2019-06-28 RX ADMIN — VERAPAMIL HYDROCHLORIDE 180 MG: 180 TABLET, FILM COATED, EXTENDED RELEASE ORAL at 09:22

## 2019-06-28 RX ADMIN — MOMETASONE FUROATE AND FORMOTEROL FUMARATE DIHYDRATE 2 PUFF: 200; 5 AEROSOL RESPIRATORY (INHALATION) at 09:22

## 2019-06-28 RX ADMIN — INSULIN LISPRO 1 UNITS: 100 INJECTION, SOLUTION INTRAVENOUS; SUBCUTANEOUS at 11:25

## 2019-06-28 ASSESSMENT — PAIN SCALES - GENERAL
PAINLEVEL_OUTOF10: 0

## 2019-06-28 NOTE — DISCHARGE SUMMARY
(PROVENTIL) (2.5 MG/3ML) 0.083% nebulizer solution Take 3 mLs by nebulization every 6 hours as needed for Wheezing  Qty: 120 each, Refills: 0         CONTINUE these medications which have NOT CHANGED    Details   potassium chloride (MICRO-K) 10 MEQ extended release capsule Take 20 mEq by mouth 2 times daily      dexamethasone (DECADRON) 0.1 % ophthalmic solution 1 drop      clobetasol prop emollient base 0.05 % CREA       !! gabapentin (NEURONTIN) 400 MG capsule nightly  Refills: 0      ofloxacin (OCUFLOX) 0.3 % solution 1 drop      minocycline (MINOCIN;DYNACIN) 100 MG capsule Take 100 mg by mouth 2 times daily      palbociclib (IBRANCE) 75 MG capsule       !! lansoprazole (PREVACID) 30 MG delayed release capsule Take by mouth      !! gabapentin (NEURONTIN) 300 MG capsule Take 300 mg by mouth 2 times daily. albuterol (PROVENTIL HFA;VENTOLIN HFA) 108 (90 BASE) MCG/ACT inhaler Inhale 2 puffs into the lungs every 6 hours as needed for Wheezing. verapamil (CALAN-SR) 180 MG CR tablet Take 180 mg by mouth 2 times daily. !! Lansoprazole (PREVACID PO) Take 30 mg by mouth daily. losartan-hydrochlorothiazide (HYZAAR) 50-12.5 MG per tablet Take 1 tablet by mouth daily. Loratadine (CLARITIN) 10 MG CAPS Take 1 capsule by mouth daily. Oyster Shell 500 MG TABS Take 500 mg by mouth daily. Bimatoprost (LUMIGAN OP) Place 1 drop into the left eye every evening. Brimonidine Tartrate (ALPHAGAN P OP) Place 2 drops into the left eye every morning. !! - Potential duplicate medications found. Please discuss with provider.       STOP taking these medications       budesonide (PULMICORT) 0.5 MG/2ML nebulizer suspension Comments:   Reason for Stopping:         budesonide (PULMICORT) 0.25 MG/2ML nebulizer suspension Comments:   Reason for Stopping:         budesonide (PULMICORT) 0.5 MG/2ML nebulizer suspension Comments:   Reason for Stopping:         formoterol (PERFOROMIST) 20 MCG/2ML nebulizer solution Comments:   Reason for Stopping:         predniSONE (DELTASONE) 10 MG tablet Comments:   Reason for Stopping:         glipiZIDE (GLUCOTROL) 10 MG tablet Comments:   Reason for Stopping:             Activity: activity as tolerated  Diet: cardiac diet    Follow-up with 1wk Dr Chichi Lock    Note that over 30 minutes was spent in preparing discharge papers, discussing discharge with patient, staff, consultants, medication review, arranging follow up, etc.    Signed:  Mraia C Rose MD  6/28/2019  12:42 PM

## 2019-06-28 NOTE — PROGRESS NOTES
left hip joint. XR CHEST PORTABLE   Final Result   No acute cardiopulmonary process. Assessment    Principal Problem:    Hypoxia  Active Problems:    Breast cancer metastasized to bone (HCC)    HTN (hypertension), benign    Diabetes mellitus type 2, uncontrolled (HCC)    Hypoglycemia    CKD (chronic kidney disease) stage 3, GFR 30-59 ml/min (HCC)    COPD (chronic obstructive pulmonary disease) (HCC)    Pancytopenia (HCC)  Resolved Problems:    * No resolved hospital problems.  *      Plan:    Admitted monitored bed  Treated  Supplemental O2  Nebulizers  Monitor blood glucose closely  Sliding scale insulin  Stop glipizide  Pulm consult-- follow up as jefferson DONOVAN on amaryl 1mg BID w meals  DC planning home today      Electronically signed by Williams Kang MD on 6/28/2019 at 8:53 AM

## 2019-06-28 NOTE — PROGRESS NOTES
[facility]  [address] [facility], [address]  [facility], [facility]      Pulmonary Progress Note      SUBJECTIVE:  Sitting up in chair, feeling better, tolerated dulera last night, wondering about going home today    OBJECTIVE    Medications    Continuous Infusions:   sodium chloride 75 mL/hr at 19 2222    dextrose         Scheduled Meds:   potassium chloride  20 mEq Oral BID    sodium chloride flush  10 mL Intravenous 2 times per day    gabapentin  300 mg Oral BID    pantoprazole  40 mg Oral Daily    minocycline  100 mg Oral BID    verapamil  180 mg Oral BID    enoxaparin  40 mg Subcutaneous Daily    ipratropium-albuterol  1 ampule Inhalation Q4H WA    insulin lispro  0-6 Units Subcutaneous TID WC    insulin lispro  0-3 Units Subcutaneous Nightly    sodium chloride  1 drop Ophthalmic Nightly    polyvinyl alcohol  1 drop Both Eyes TID    mometasone-formoterol  2 puff Inhalation BID       PRN Meds:neomycin-bacitracin-polymyxin, sodium chloride flush, acetaminophen, albuterol sulfate HFA, magnesium hydroxide, ondansetron, glucose, dextrose, glucagon (rDNA), dextrose    Physical    VITALS:  BP (!) 166/78   Pulse 80   Temp 97.1 °F (36.2 °C)   Resp 18   Ht 5' 3\" (1.6 m)   Wt 164 lb 6.4 oz (74.6 kg)   SpO2 100%   BMI 29.12 kg/m²     24HR INTAKE/OUTPUT:      Intake/Output Summary (Last 24 hours) at 2019 0916  Last data filed at 2019 0648  Gross per 24 hour   Intake 1627.3 ml   Output 1500 ml   Net 127.3 ml       24HR PULSE OXIMETRY RANGE:    SpO2  Av.5 %  Min: 95 %  Max: 100 %    General appearance: alert, appears stated age and cooperative  Lungs: clear to auscultation bilaterally  Heart: regular rate and rhythm, S1, S2 normal, no murmur, click, rub or gallop  Abdomen: soft, non-tender; bowel sounds normal; no masses,  no organomegaly  Extremities: extremities normal, atraumatic, no cyanosis or edema  Neurologic: Mental

## 2019-08-15 ENCOUNTER — APPOINTMENT (OUTPATIENT)
Dept: CT IMAGING | Age: 79
End: 2019-08-15
Payer: COMMERCIAL

## 2019-08-15 ENCOUNTER — HOSPITAL ENCOUNTER (EMERGENCY)
Age: 79
Discharge: HOME | End: 2019-08-15
Payer: COMMERCIAL

## 2019-08-15 ENCOUNTER — APPOINTMENT (OUTPATIENT)
Dept: GENERAL RADIOLOGY | Age: 79
End: 2019-08-15
Payer: COMMERCIAL

## 2019-08-15 VITALS
BODY MASS INDEX: 29.23 KG/M2 | HEIGHT: 63 IN | OXYGEN SATURATION: 100 % | WEIGHT: 165 LBS | DIASTOLIC BLOOD PRESSURE: 75 MMHG | HEART RATE: 91 BPM | RESPIRATION RATE: 16 BRPM | SYSTOLIC BLOOD PRESSURE: 135 MMHG | TEMPERATURE: 98.1 F
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
## 2019-08-15 DIAGNOSIS — S81.812A NONINFECTED SKIN TEAR OF LEFT LOWER EXTREMITY, INITIAL ENCOUNTER: ICD-10-CM

## 2019-08-15 DIAGNOSIS — T07.XXXA MULTIPLE CONTUSIONS: ICD-10-CM

## 2019-08-15 PROCEDURE — 99283 EMERGENCY DEPT VISIT LOW MDM: CPT

## 2019-08-15 PROCEDURE — 6370000000 HC RX 637 (ALT 250 FOR IP): Performed by: PHYSICIAN ASSISTANT

## 2019-08-15 PROCEDURE — 72125 CT NECK SPINE W/O DYE: CPT

## 2019-08-15 PROCEDURE — 73552 X-RAY EXAM OF FEMUR 2/>: CPT

## 2019-08-15 PROCEDURE — 71101 X-RAY EXAM UNILAT RIBS/CHEST: CPT

## 2019-08-15 PROCEDURE — 70450 CT HEAD/BRAIN W/O DYE: CPT

## 2019-08-15 RX ORDER — DIAPER,BRIEF,INFANT-TODD,DISP
EACH MISCELLANEOUS ONCE
Status: COMPLETED | OUTPATIENT
Start: 2019-08-15 | End: 2019-08-15

## 2019-08-15 RX ADMIN — BACITRACIN ZINC: 500 OINTMENT TOPICAL at 14:27

## 2019-08-15 ASSESSMENT — PAIN DESCRIPTION - ORIENTATION: ORIENTATION: RIGHT

## 2019-08-15 ASSESSMENT — PAIN DESCRIPTION - PAIN TYPE: TYPE: ACUTE PAIN

## 2019-08-15 ASSESSMENT — PAIN DESCRIPTION - DESCRIPTORS: DESCRIPTORS: ACHING

## 2019-08-15 ASSESSMENT — PAIN SCALES - GENERAL: PAINLEVEL_OUTOF10: 10

## 2019-08-15 ASSESSMENT — PAIN DESCRIPTION - LOCATION: LOCATION: BACK

## 2019-08-15 NOTE — ED PROVIDER NOTES
results interpreted by Radiologist unless otherwise noted. XR FEMUR LEFT (MIN 2 VIEWS)   Final Result   1. No radiographic evidence of acute osseous abnormality or fracture. .   If there is persistent clinical pain or symptomatology, a return to   medical attention within 2-7 days and further imaging is recommended. .   2.. Mild left hip osteoarthritis. 3. Osteopenia. 4. Vascular calcifications. XR RIBS RIGHT INCLUDE CHEST (MIN 3 VIEWS)   Final Result   1. Increased asymmetric density overlying the posterior right seventh   rib possibly reflective of an overlapping anterior right fifth rib,   finding is indeterminate. Clinical correlation with point tenderness   recommended. If there is clinical concern for potential bony   abnormality or fracture this region a CT scan chest is recommended. 2. Moderately severe bilateral glenohumeral osteoarthritis. 3. No radiographic evidence of acute cardiopulmonary disease. CT Head WO Contrast   Final Result   Diffuse atrophy likely age related   Findings compatible with small vessel ischemic changes. CT Cervical Spine WO Contrast   Final Result   Degenerative changes        ED Course / Medical Decision Making     Medications   bacitracin zinc ointment ( Topical Given 8/15/19 9860)        Consult(s):   None    Procedure(s):   none    MDM:   Patient in no acute distress. Vital signs stable. Imaging negative for acute process. No focal neurologic deficits noted on exam.  Wounds were cleansed to her left knee. Bacitracin and wound dressing applied patient will follow with PCP. Educated on the signs and symptoms to return to the ED. Discharged in stable condition. Counseling: The emergency provider has spoken with the patient and discussed todays results, in addition to providing specific details for the plan of care and counseling regarding the diagnosis and prognosis.   Questions are answered at this time and they are agreeable with the

## 2019-08-15 NOTE — ED NOTES
Wound to left knee cleaned with NSS and Bactroban along with DSD applied at this time.       Amanda Kimball RN  08/15/19 3025

## 2019-09-11 ENCOUNTER — HOSPITAL ENCOUNTER (OUTPATIENT)
Age: 79
Discharge: HOME | End: 2019-09-11
Payer: COMMERCIAL

## 2019-09-11 LAB
ALBUMIN SERPL-MCNC: 3.6 G/DL (ref 3.5–5.2)
ALP BLD-CCNC: 65 U/L (ref 35–104)
ALT SERPL-CCNC: 17 U/L (ref 0–32)
ANION GAP SERPL CALCULATED.3IONS-SCNC: 12 MMOL/L (ref 7–16)
ANISOCYTOSIS: ABNORMAL
AST SERPL-CCNC: 23 U/L (ref 0–31)
BASOPHILS ABSOLUTE: 0.06 E9/L (ref 0–0.2)
BASOPHILS RELATIVE PERCENT: 1.8 % (ref 0–2)
BILIRUB SERPL-MCNC: 0.3 MG/DL (ref 0–1.2)
BUN BLDV-MCNC: 25 MG/DL (ref 8–23)
CALCIUM SERPL-MCNC: 9 MG/DL (ref 8.6–10.2)
CHLORIDE BLD-SCNC: 103 MMOL/L (ref 98–107)
CO2: 26 MMOL/L (ref 22–29)
CREAT SERPL-MCNC: 1.5 MG/DL (ref 0.5–1)
EOSINOPHILS ABSOLUTE: 0.17 E9/L (ref 0.05–0.5)
EOSINOPHILS RELATIVE PERCENT: 5.3 % (ref 0–6)
GFR AFRICAN AMERICAN: 41
GFR NON-AFRICAN AMERICAN: 41 ML/MIN/1.73
GLUCOSE BLD-MCNC: 75 MG/DL (ref 74–99)
HCT VFR BLD CALC: 35.3 % (ref 34–48)
HEMOGLOBIN: 11.3 G/DL (ref 11.5–15.5)
LYMPHOCYTES ABSOLUTE: 1.06 E9/L (ref 1.5–4)
LYMPHOCYTES RELATIVE PERCENT: 31.6 % (ref 20–42)
MCH RBC QN AUTO: 31.7 PG (ref 26–35)
MCHC RBC AUTO-ENTMCNC: 32 % (ref 32–34.5)
MCV RBC AUTO: 99.2 FL (ref 80–99.9)
MONOCYTES ABSOLUTE: 0.16 E9/L (ref 0.1–0.95)
MONOCYTES RELATIVE PERCENT: 5.3 % (ref 2–12)
NEUTROPHILS ABSOLUTE: 1.85 E9/L (ref 1.8–7.3)
NEUTROPHILS RELATIVE PERCENT: 56.1 % (ref 43–80)
NUCLEATED RED BLOOD CELLS: 0.9 /100 WBC
OVALOCYTES: ABNORMAL
PARATHYROID HORMONE INTACT: 62 PG/ML (ref 15–65)
PDW BLD-RTO: 15.5 FL (ref 11.5–15)
PHOSPHORUS: 4 MG/DL (ref 2.5–4.5)
PLATELET # BLD: 159 E9/L (ref 130–450)
PMV BLD AUTO: 10 FL (ref 7–12)
POIKILOCYTES: ABNORMAL
POLYCHROMASIA: ABNORMAL
POTASSIUM SERPL-SCNC: 4.4 MMOL/L (ref 3.5–5)
RBC # BLD: 3.56 E12/L (ref 3.5–5.5)
SODIUM BLD-SCNC: 141 MMOL/L (ref 132–146)
TOTAL PROTEIN: 6.8 G/DL (ref 6.4–8.3)
VITAMIN D 25-HYDROXY: 26 NG/ML (ref 30–100)
WBC # BLD: 3.3 E9/L (ref 4.5–11.5)

## 2019-09-11 PROCEDURE — 36415 COLL VENOUS BLD VENIPUNCTURE: CPT

## 2019-09-11 PROCEDURE — 85025 COMPLETE CBC W/AUTO DIFF WBC: CPT

## 2019-09-11 PROCEDURE — 82306 VITAMIN D 25 HYDROXY: CPT

## 2019-09-11 PROCEDURE — 84100 ASSAY OF PHOSPHORUS: CPT

## 2019-09-11 PROCEDURE — 83970 ASSAY OF PARATHORMONE: CPT

## 2019-09-11 PROCEDURE — 80053 COMPREHEN METABOLIC PANEL: CPT

## 2019-09-20 ENCOUNTER — HOSPITAL ENCOUNTER (OUTPATIENT)
Age: 79
Discharge: HOME | End: 2019-09-20
Payer: COMMERCIAL

## 2019-09-20 LAB
ANION GAP SERPL CALCULATED.3IONS-SCNC: 14 MMOL/L (ref 7–16)
BUN BLDV-MCNC: 16 MG/DL (ref 8–23)
CALCIUM SERPL-MCNC: 9.2 MG/DL (ref 8.6–10.2)
CHLORIDE BLD-SCNC: 102 MMOL/L (ref 98–107)
CO2: 27 MMOL/L (ref 22–29)
CREAT SERPL-MCNC: 1.4 MG/DL (ref 0.5–1)
GFR AFRICAN AMERICAN: 44
GFR NON-AFRICAN AMERICAN: 44 ML/MIN/1.73
GLUCOSE BLD-MCNC: 156 MG/DL (ref 74–99)
POTASSIUM SERPL-SCNC: 4.3 MMOL/L (ref 3.5–5)
SODIUM BLD-SCNC: 143 MMOL/L (ref 132–146)

## 2019-09-20 PROCEDURE — 80048 BASIC METABOLIC PNL TOTAL CA: CPT

## 2019-09-20 PROCEDURE — 36415 COLL VENOUS BLD VENIPUNCTURE: CPT

## 2019-10-25 ENCOUNTER — HOSPITAL ENCOUNTER (OUTPATIENT)
Dept: GENERAL RADIOLOGY | Age: 79
Discharge: HOME | End: 2019-10-27
Payer: COMMERCIAL

## 2019-10-25 ENCOUNTER — HOSPITAL ENCOUNTER (OUTPATIENT)
Age: 79
Discharge: HOME | End: 2019-10-27
Payer: COMMERCIAL

## 2019-10-25 DIAGNOSIS — M25.552 LEFT HIP PAIN: ICD-10-CM

## 2019-10-25 DIAGNOSIS — Z85.3 PERSONAL HISTORY OF BREAST CANCER: ICD-10-CM

## 2019-10-25 PROCEDURE — 73552 X-RAY EXAM OF FEMUR 2/>: CPT

## 2019-10-25 PROCEDURE — 77063 BREAST TOMOSYNTHESIS BI: CPT

## 2020-01-01 ENCOUNTER — HOSPITAL ENCOUNTER (OUTPATIENT)
Age: 80
Discharge: HOME | End: 2020-06-09
Payer: COMMERCIAL

## 2020-01-01 ENCOUNTER — HOSPITAL ENCOUNTER (OUTPATIENT)
Age: 80
Discharge: HOME | End: 2020-10-10
Payer: COMMERCIAL

## 2020-01-01 ENCOUNTER — HOSPITAL ENCOUNTER (OUTPATIENT)
Dept: GENERAL RADIOLOGY | Age: 80
Discharge: HOME | End: 2020-11-01
Payer: COMMERCIAL

## 2020-01-01 ENCOUNTER — HOSPITAL ENCOUNTER (OUTPATIENT)
Dept: CT IMAGING | Age: 80
Discharge: HOME | End: 2020-05-24
Payer: COMMERCIAL

## 2020-01-01 LAB
ALBUMIN SERPL-MCNC: 2.7 G/DL (ref 3.5–5.2)
ALBUMIN SERPL-MCNC: 3.4 G/DL (ref 3.5–5.2)
ALP BLD-CCNC: 100 U/L (ref 35–104)
ALP BLD-CCNC: 83 U/L (ref 35–104)
ALT SERPL-CCNC: 14 U/L (ref 0–32)
ALT SERPL-CCNC: 15 U/L (ref 0–32)
ANION GAP SERPL CALCULATED.3IONS-SCNC: 11 MMOL/L (ref 7–16)
ANION GAP SERPL CALCULATED.3IONS-SCNC: 14 MMOL/L (ref 7–16)
ANISOCYTOSIS: ABNORMAL
ANISOCYTOSIS: ABNORMAL
AST SERPL-CCNC: 25 U/L (ref 0–31)
AST SERPL-CCNC: 27 U/L (ref 0–31)
BASOPHILS ABSOLUTE: 0.09 E9/L (ref 0–0.2)
BASOPHILS ABSOLUTE: 0.18 E9/L (ref 0–0.2)
BASOPHILS RELATIVE PERCENT: 4.4 % (ref 0–2)
BASOPHILS RELATIVE PERCENT: 7 % (ref 0–2)
BILIRUB SERPL-MCNC: 0.4 MG/DL (ref 0–1.2)
BILIRUB SERPL-MCNC: 0.4 MG/DL (ref 0–1.2)
BLASTS RELATIVE PERCENT: 0.9 % (ref 0–0)
BUN BLDV-MCNC: 17 MG/DL (ref 8–23)
BUN BLDV-MCNC: 22 MG/DL (ref 8–23)
CALCIUM SERPL-MCNC: 8.6 MG/DL (ref 8.6–10.2)
CALCIUM SERPL-MCNC: 9 MG/DL (ref 8.6–10.2)
CHLORIDE BLD-SCNC: 104 MMOL/L (ref 98–107)
CHLORIDE BLD-SCNC: 105 MMOL/L (ref 98–107)
CO2: 25 MMOL/L (ref 22–29)
CO2: 27 MMOL/L (ref 22–29)
CREAT SERPL-MCNC: 1.5 MG/DL (ref 0.5–1)
CREAT SERPL-MCNC: 1.5 MG/DL (ref 0.5–1)
EOSINOPHILS ABSOLUTE: 0.11 E9/L (ref 0.05–0.5)
EOSINOPHILS ABSOLUTE: 0.23 E9/L (ref 0.05–0.5)
EOSINOPHILS RELATIVE PERCENT: 5.3 % (ref 0–6)
EOSINOPHILS RELATIVE PERCENT: 8.8 % (ref 0–6)
GFR AFRICAN AMERICAN: 40
GFR AFRICAN AMERICAN: 41
GFR NON-AFRICAN AMERICAN: 40 ML/MIN/1.73
GFR NON-AFRICAN AMERICAN: 41 ML/MIN/1.73
GLUCOSE BLD-MCNC: 102 MG/DL (ref 74–99)
GLUCOSE BLD-MCNC: 86 MG/DL (ref 74–99)
HCT VFR BLD CALC: 29.2 % (ref 34–48)
HCT VFR BLD CALC: 31.4 % (ref 34–48)
HEMOGLOBIN: 10.3 G/DL (ref 11.5–15.5)
HEMOGLOBIN: 9.5 G/DL (ref 11.5–15.5)
HYPOCHROMIA: ABNORMAL
HYPOCHROMIA: ABNORMAL
LYMPHOCYTES ABSOLUTE: 0.5 E9/L (ref 1.5–4)
LYMPHOCYTES ABSOLUTE: 0.52 E9/L (ref 1.5–4)
LYMPHOCYTES RELATIVE PERCENT: 20.2 % (ref 20–42)
LYMPHOCYTES RELATIVE PERCENT: 24.8 % (ref 20–42)
MCH RBC QN AUTO: 32.5 PG (ref 26–35)
MCH RBC QN AUTO: 32.8 PG (ref 26–35)
MCHC RBC AUTO-ENTMCNC: 32.5 % (ref 32–34.5)
MCHC RBC AUTO-ENTMCNC: 32.8 % (ref 32–34.5)
MCV RBC AUTO: 100 FL (ref 80–99.9)
MCV RBC AUTO: 100 FL (ref 80–99.9)
MONOCYTES ABSOLUTE: 0.1 E9/L (ref 0.1–0.95)
MONOCYTES ABSOLUTE: 0.1 E9/L (ref 0.1–0.95)
MONOCYTES RELATIVE PERCENT: 3.5 % (ref 2–12)
MONOCYTES RELATIVE PERCENT: 5.3 % (ref 2–12)
NEUTROPHILS ABSOLUTE: 1.18 E9/L (ref 1.8–7.3)
NEUTROPHILS ABSOLUTE: 1.59 E9/L (ref 1.8–7.3)
NEUTROPHILS RELATIVE PERCENT: 59.3 % (ref 43–80)
NEUTROPHILS RELATIVE PERCENT: 60.5 % (ref 43–80)
OVALOCYTES: ABNORMAL
OVALOCYTES: ABNORMAL
PDW BLD-RTO: 16.6 FL (ref 11.5–15)
PDW BLD-RTO: 17 FL (ref 11.5–15)
PHOSPHORUS: 3.7 MG/DL (ref 2.5–4.5)
PHOSPHORUS: 3.7 MG/DL (ref 2.5–4.5)
PLATELET # BLD: 106 E9/L (ref 130–450)
PLATELET # BLD: 218 E9/L (ref 130–450)
PMV BLD AUTO: 10.2 FL (ref 7–12)
PMV BLD AUTO: 9.8 FL (ref 7–12)
POIKILOCYTES: ABNORMAL
POIKILOCYTES: ABNORMAL
POLYCHROMASIA: ABNORMAL
POLYCHROMASIA: ABNORMAL
POTASSIUM SERPL-SCNC: 3.9 MMOL/L (ref 3.5–5)
POTASSIUM SERPL-SCNC: 4.4 MMOL/L (ref 3.5–5)
RBC # BLD: 2.92 E12/L (ref 3.5–5.5)
RBC # BLD: 3.14 E12/L (ref 3.5–5.5)
SCHISTOCYTES: ABNORMAL
SODIUM BLD-SCNC: 143 MMOL/L (ref 132–146)
SODIUM BLD-SCNC: 143 MMOL/L (ref 132–146)
STOMATOCYTES: ABNORMAL
TARGET CELLS: ABNORMAL
TEAR DROP CELLS: ABNORMAL
TOTAL PROTEIN: 5.8 G/DL (ref 6.4–8.3)
TOTAL PROTEIN: 6.5 G/DL (ref 6.4–8.3)
WBC # BLD: 2 E9/L (ref 4.5–11.5)
WBC # BLD: 2.6 E9/L (ref 4.5–11.5)

## 2020-01-01 PROCEDURE — 80053 COMPREHEN METABOLIC PANEL: CPT

## 2020-01-01 PROCEDURE — 71250 CT THORAX DX C-: CPT

## 2020-01-01 PROCEDURE — 36415 COLL VENOUS BLD VENIPUNCTURE: CPT

## 2020-01-01 PROCEDURE — 85025 COMPLETE CBC W/AUTO DIFF WBC: CPT

## 2020-01-01 PROCEDURE — 77063 BREAST TOMOSYNTHESIS BI: CPT

## 2020-01-01 PROCEDURE — 84100 ASSAY OF PHOSPHORUS: CPT

## 2020-01-27 ENCOUNTER — HOSPITAL ENCOUNTER (OUTPATIENT)
Age: 80
Discharge: HOME | End: 2020-01-27
Payer: COMMERCIAL

## 2020-01-27 LAB
ALBUMIN SERPL-MCNC: 3.1 G/DL (ref 3.5–5.2)
ALP BLD-CCNC: 92 U/L (ref 35–104)
ALT SERPL-CCNC: 20 U/L (ref 0–32)
ANION GAP SERPL CALCULATED.3IONS-SCNC: 13 MMOL/L (ref 7–16)
ANISOCYTOSIS: ABNORMAL
AST SERPL-CCNC: 33 U/L (ref 0–31)
BASOPHILS ABSOLUTE: 0.05 E9/L (ref 0–0.2)
BASOPHILS RELATIVE PERCENT: 1.8 % (ref 0–2)
BILIRUB SERPL-MCNC: 0.6 MG/DL (ref 0–1.2)
BUN BLDV-MCNC: 23 MG/DL (ref 8–23)
CALCIUM SERPL-MCNC: 9.1 MG/DL (ref 8.6–10.2)
CHLORIDE BLD-SCNC: 105 MMOL/L (ref 98–107)
CO2: 28 MMOL/L (ref 22–29)
CREAT SERPL-MCNC: 1.5 MG/DL (ref 0.5–1)
EOSINOPHILS ABSOLUTE: 0.29 E9/L (ref 0.05–0.5)
EOSINOPHILS RELATIVE PERCENT: 10 % (ref 0–6)
GFR AFRICAN AMERICAN: 41
GFR NON-AFRICAN AMERICAN: 41 ML/MIN/1.73
GLUCOSE BLD-MCNC: 58 MG/DL (ref 74–99)
HCT VFR BLD CALC: 32.5 % (ref 34–48)
HEMOGLOBIN: 10.4 G/DL (ref 11.5–15.5)
HYPOCHROMIA: ABNORMAL
LYMPHOCYTES ABSOLUTE: 0.52 E9/L (ref 1.5–4)
LYMPHOCYTES RELATIVE PERCENT: 18.2 % (ref 20–42)
MCH RBC QN AUTO: 31 PG (ref 26–35)
MCHC RBC AUTO-ENTMCNC: 32 % (ref 32–34.5)
MCV RBC AUTO: 97 FL (ref 80–99.9)
MONOCYTES ABSOLUTE: 0.32 E9/L (ref 0.1–0.95)
MONOCYTES RELATIVE PERCENT: 10.9 % (ref 2–12)
MYELOCYTE PERCENT: 0.9 % (ref 0–0)
NEUTROPHILS ABSOLUTE: 1.71 E9/L (ref 1.8–7.3)
NEUTROPHILS RELATIVE PERCENT: 58.2 % (ref 43–80)
OVALOCYTES: ABNORMAL
PARATHYROID HORMONE INTACT: 51 PG/ML (ref 15–65)
PDW BLD-RTO: 15.3 FL (ref 11.5–15)
PHOSPHORUS: 3.4 MG/DL (ref 2.5–4.5)
PLATELET # BLD: 165 E9/L (ref 130–450)
PMV BLD AUTO: 11 FL (ref 7–12)
POIKILOCYTES: ABNORMAL
POLYCHROMASIA: ABNORMAL
POTASSIUM SERPL-SCNC: 4.8 MMOL/L (ref 3.5–5)
RBC # BLD: 3.35 E12/L (ref 3.5–5.5)
SODIUM BLD-SCNC: 146 MMOL/L (ref 132–146)
TARGET CELLS: ABNORMAL
TOTAL PROTEIN: 6.2 G/DL (ref 6.4–8.3)
VITAMIN D 25-HYDROXY: 29 NG/ML (ref 30–100)
WBC # BLD: 2.9 E9/L (ref 4.5–11.5)

## 2020-01-27 PROCEDURE — 82306 VITAMIN D 25 HYDROXY: CPT

## 2020-01-27 PROCEDURE — 85025 COMPLETE CBC W/AUTO DIFF WBC: CPT

## 2020-01-27 PROCEDURE — 84100 ASSAY OF PHOSPHORUS: CPT

## 2020-01-27 PROCEDURE — 36415 COLL VENOUS BLD VENIPUNCTURE: CPT

## 2020-01-27 PROCEDURE — 83970 ASSAY OF PARATHORMONE: CPT

## 2020-01-27 PROCEDURE — 80053 COMPREHEN METABOLIC PANEL: CPT

## 2021-01-01 ENCOUNTER — ANESTHESIA (OUTPATIENT)
Dept: OPERATING ROOM | Age: 81
DRG: 808 | End: 2021-01-01
Payer: COMMERCIAL

## 2021-01-01 ENCOUNTER — HOSPITAL ENCOUNTER (INPATIENT)
Age: 81
LOS: 5 days | Discharge: HOME HEALTH | DRG: 808 | End: 2021-01-29
Attending: EMERGENCY MEDICINE | Admitting: INTERNAL MEDICINE
Payer: COMMERCIAL

## 2021-01-01 ENCOUNTER — APPOINTMENT (OUTPATIENT)
Dept: GENERAL RADIOLOGY | Age: 81
DRG: 808 | End: 2021-01-01
Payer: COMMERCIAL

## 2021-01-01 ENCOUNTER — TELEPHONE (OUTPATIENT)
Dept: CASE MANAGEMENT | Age: 81
End: 2021-01-01

## 2021-01-01 ENCOUNTER — ANESTHESIA EVENT (OUTPATIENT)
Dept: OPERATING ROOM | Age: 81
DRG: 808 | End: 2021-01-01
Payer: COMMERCIAL

## 2021-01-01 ENCOUNTER — HOSPITAL ENCOUNTER (INPATIENT)
Age: 81
LOS: 4 days | Discharge: HOSPICE | DRG: 871 | End: 2021-02-17
Attending: EMERGENCY MEDICINE | Admitting: INTERNAL MEDICINE
Payer: COMMERCIAL

## 2021-01-01 ENCOUNTER — APPOINTMENT (OUTPATIENT)
Dept: GENERAL RADIOLOGY | Age: 81
DRG: 871 | End: 2021-01-01
Payer: COMMERCIAL

## 2021-01-01 ENCOUNTER — APPOINTMENT (OUTPATIENT)
Dept: CT IMAGING | Age: 81
DRG: 871 | End: 2021-01-01
Payer: COMMERCIAL

## 2021-01-01 ENCOUNTER — APPOINTMENT (OUTPATIENT)
Dept: ULTRASOUND IMAGING | Age: 81
DRG: 808 | End: 2021-01-01
Payer: COMMERCIAL

## 2021-01-01 ENCOUNTER — APPOINTMENT (OUTPATIENT)
Dept: CT IMAGING | Age: 81
DRG: 808 | End: 2021-01-01
Payer: COMMERCIAL

## 2021-01-01 VITALS
TEMPERATURE: 98.6 F | DIASTOLIC BLOOD PRESSURE: 70 MMHG | SYSTOLIC BLOOD PRESSURE: 134 MMHG | WEIGHT: 145.3 LBS | HEIGHT: 63 IN | OXYGEN SATURATION: 83 % | HEART RATE: 91 BPM | BODY MASS INDEX: 25.75 KG/M2 | RESPIRATION RATE: 15 BRPM

## 2021-01-01 VITALS
TEMPERATURE: 97.7 F | HEIGHT: 63 IN | BODY MASS INDEX: 26.4 KG/M2 | RESPIRATION RATE: 18 BRPM | HEART RATE: 101 BPM | DIASTOLIC BLOOD PRESSURE: 54 MMHG | OXYGEN SATURATION: 92 % | WEIGHT: 149 LBS | SYSTOLIC BLOOD PRESSURE: 123 MMHG

## 2021-01-01 VITALS — DIASTOLIC BLOOD PRESSURE: 83 MMHG | OXYGEN SATURATION: 96 % | SYSTOLIC BLOOD PRESSURE: 98 MMHG

## 2021-01-01 DIAGNOSIS — A41.9 SEPTICEMIA (HCC): Primary | ICD-10-CM

## 2021-01-01 DIAGNOSIS — R62.7 FAILURE TO THRIVE IN ADULT: ICD-10-CM

## 2021-01-01 DIAGNOSIS — K52.9 COLITIS: ICD-10-CM

## 2021-01-01 DIAGNOSIS — E86.0 DEHYDRATION: ICD-10-CM

## 2021-01-01 DIAGNOSIS — G93.40 ENCEPHALOPATHY ACUTE: ICD-10-CM

## 2021-01-01 DIAGNOSIS — E87.0 HYPERNATREMIA: ICD-10-CM

## 2021-01-01 DIAGNOSIS — C50.919 STAGE IV BREAST CANCER IN FEMALE (HCC): ICD-10-CM

## 2021-01-01 DIAGNOSIS — S06.5XAA SUBDURAL HEMATOMA: ICD-10-CM

## 2021-01-01 DIAGNOSIS — R91.8 LUNG MASS: ICD-10-CM

## 2021-01-01 DIAGNOSIS — E16.2 HYPOGLYCEMIA: ICD-10-CM

## 2021-01-01 DIAGNOSIS — R09.02 HYPOXIA: Primary | ICD-10-CM

## 2021-01-01 LAB
AADO2: 309.7 MMHG
ABO/RH: NORMAL
ABO/RH: NORMAL
ACANTHOCYTES: ABNORMAL
ACETAMINOPHEN LEVEL: <5 MCG/ML (ref 10–30)
ALBUMIN SERPL-MCNC: 1.7 G/DL (ref 3.5–5.2)
ALBUMIN SERPL-MCNC: 1.8 G/DL (ref 3.5–5.2)
ALBUMIN SERPL-MCNC: 2.1 G/DL (ref 3.5–5.2)
ALBUMIN SERPL-MCNC: 2.2 G/DL (ref 3.5–5.2)
ALBUMIN SERPL-MCNC: 2.3 G/DL (ref 3.5–5.2)
ALP BLD-CCNC: 115 U/L (ref 35–104)
ALP BLD-CCNC: 117 U/L (ref 35–104)
ALP BLD-CCNC: 122 U/L (ref 35–104)
ALP BLD-CCNC: 135 U/L (ref 35–104)
ALP BLD-CCNC: 160 U/L (ref 35–104)
ALP BLD-CCNC: 177 U/L (ref 35–104)
ALP BLD-CCNC: 221 U/L (ref 35–104)
ALT SERPL-CCNC: 19 U/L (ref 0–32)
ALT SERPL-CCNC: 23 U/L (ref 0–32)
ALT SERPL-CCNC: 23 U/L (ref 0–32)
ALT SERPL-CCNC: 24 U/L (ref 0–32)
ALT SERPL-CCNC: 27 U/L (ref 0–32)
ALT SERPL-CCNC: 28 U/L (ref 0–32)
ALT SERPL-CCNC: 29 U/L (ref 0–32)
AMMONIA: 30 UMOL/L (ref 11–51)
AMMONIA: 30 UMOL/L (ref 11–51)
AMPHETAMINE SCREEN, URINE: NOT DETECTED
ANION GAP SERPL CALCULATED.3IONS-SCNC: 10 MMOL/L (ref 7–16)
ANION GAP SERPL CALCULATED.3IONS-SCNC: 10 MMOL/L (ref 7–16)
ANION GAP SERPL CALCULATED.3IONS-SCNC: 11 MMOL/L (ref 7–16)
ANION GAP SERPL CALCULATED.3IONS-SCNC: 13 MMOL/L (ref 7–16)
ANION GAP SERPL CALCULATED.3IONS-SCNC: 14 MMOL/L (ref 7–16)
ANION GAP SERPL CALCULATED.3IONS-SCNC: 18 MMOL/L (ref 7–16)
ANION GAP SERPL CALCULATED.3IONS-SCNC: 19 MMOL/L (ref 7–16)
ANION GAP SERPL CALCULATED.3IONS-SCNC: 8 MMOL/L (ref 7–16)
ANION GAP SERPL CALCULATED.3IONS-SCNC: 8 MMOL/L (ref 7–16)
ANION GAP SERPL CALCULATED.3IONS-SCNC: 9 MMOL/L (ref 7–16)
ANISOCYTOSIS: ABNORMAL
ANTIBODY SCREEN: NORMAL
ANTIBODY SCREEN: NORMAL
APTT: 31.6 SEC (ref 24.5–35.1)
APTT: 40.6 SEC (ref 24.5–35.1)
APTT: 41.3 SEC (ref 24.5–35.1)
APTT: 41.5 SEC (ref 24.5–35.1)
AST SERPL-CCNC: 35 U/L (ref 0–31)
AST SERPL-CCNC: 42 U/L (ref 0–31)
AST SERPL-CCNC: 42 U/L (ref 0–31)
AST SERPL-CCNC: 44 U/L (ref 0–31)
AST SERPL-CCNC: 48 U/L (ref 0–31)
AST SERPL-CCNC: 48 U/L (ref 0–31)
AST SERPL-CCNC: 50 U/L (ref 0–31)
B.E.: -4.7 MMOL/L (ref -3–3)
B.E.: -5.9 MMOL/L (ref -3–3)
B.E.: -6 MMOL/L (ref -3–3)
B.E.: 0.4 MMOL/L (ref -3–3)
BACTERIA: ABNORMAL /HPF
BACTERIA: NORMAL /HPF
BARBITURATE SCREEN URINE: NOT DETECTED
BASOPHILIC STIPPLING: ABNORMAL
BASOPHILS ABSOLUTE: 0 E9/L (ref 0–0.2)
BASOPHILS ABSOLUTE: 0.02 E9/L (ref 0–0.2)
BASOPHILS ABSOLUTE: 0.02 E9/L (ref 0–0.2)
BASOPHILS ABSOLUTE: 0.04 E9/L (ref 0–0.2)
BASOPHILS RELATIVE PERCENT: 0 % (ref 0–2)
BASOPHILS RELATIVE PERCENT: 0.2 % (ref 0–2)
BASOPHILS RELATIVE PERCENT: 0.3 % (ref 0–2)
BASOPHILS RELATIVE PERCENT: 0.4 % (ref 0–2)
BASOPHILS RELATIVE PERCENT: 0.9 % (ref 0–2)
BASOPHILS RELATIVE PERCENT: 1 % (ref 0–2)
BASOPHILS RELATIVE PERCENT: 1.8 % (ref 0–2)
BENZODIAZEPINE SCREEN, URINE: NOT DETECTED
BILIRUB SERPL-MCNC: 0.4 MG/DL (ref 0–1.2)
BILIRUB SERPL-MCNC: 0.4 MG/DL (ref 0–1.2)
BILIRUB SERPL-MCNC: 0.5 MG/DL (ref 0–1.2)
BILIRUB SERPL-MCNC: 0.5 MG/DL (ref 0–1.2)
BILIRUB SERPL-MCNC: 0.6 MG/DL (ref 0–1.2)
BILIRUB SERPL-MCNC: 0.9 MG/DL (ref 0–1.2)
BILIRUB SERPL-MCNC: 1.4 MG/DL (ref 0–1.2)
BILIRUBIN URINE: ABNORMAL
BILIRUBIN URINE: NEGATIVE
BLOOD BANK DISPENSE STATUS: NORMAL
BLOOD BANK PRODUCT CODE: NORMAL
BLOOD CULTURE, ROUTINE: NORMAL
BLOOD CULTURE, ROUTINE: NORMAL
BLOOD, URINE: NEGATIVE
BLOOD, URINE: NEGATIVE
BPU ID: NORMAL
BUN BLDV-MCNC: 16 MG/DL (ref 8–23)
BUN BLDV-MCNC: 17 MG/DL (ref 8–23)
BUN BLDV-MCNC: 18 MG/DL (ref 8–23)
BUN BLDV-MCNC: 19 MG/DL (ref 8–23)
BUN BLDV-MCNC: 19 MG/DL (ref 8–23)
BUN BLDV-MCNC: 20 MG/DL (ref 8–23)
BUN BLDV-MCNC: 21 MG/DL (ref 8–23)
BUN BLDV-MCNC: 24 MG/DL (ref 8–23)
BUN BLDV-MCNC: 27 MG/DL (ref 8–23)
BUN BLDV-MCNC: 27 MG/DL (ref 8–23)
BUN BLDV-MCNC: 31 MG/DL (ref 8–23)
BURR CELLS: ABNORMAL
BURR CELLS: ABNORMAL
CALCIUM SERPL-MCNC: 6.2 MG/DL (ref 8.6–10.2)
CALCIUM SERPL-MCNC: 6.3 MG/DL (ref 8.6–10.2)
CALCIUM SERPL-MCNC: 6.4 MG/DL (ref 8.6–10.2)
CALCIUM SERPL-MCNC: 6.5 MG/DL (ref 8.6–10.2)
CALCIUM SERPL-MCNC: 6.5 MG/DL (ref 8.6–10.2)
CALCIUM SERPL-MCNC: 6.6 MG/DL (ref 8.6–10.2)
CALCIUM SERPL-MCNC: 6.6 MG/DL (ref 8.6–10.2)
CALCIUM SERPL-MCNC: 6.7 MG/DL (ref 8.6–10.2)
CALCIUM SERPL-MCNC: 6.8 MG/DL (ref 8.6–10.2)
CALCIUM SERPL-MCNC: 7.2 MG/DL (ref 8.6–10.2)
CALCIUM SERPL-MCNC: 7.6 MG/DL (ref 8.6–10.2)
CALCIUM SERPL-MCNC: 7.8 MG/DL (ref 8.6–10.2)
CALCIUM SERPL-MCNC: 7.9 MG/DL (ref 8.6–10.2)
CANNABINOID SCREEN URINE: NOT DETECTED
CHLORIDE BLD-SCNC: 100 MMOL/L (ref 98–107)
CHLORIDE BLD-SCNC: 100 MMOL/L (ref 98–107)
CHLORIDE BLD-SCNC: 101 MMOL/L (ref 98–107)
CHLORIDE BLD-SCNC: 105 MMOL/L (ref 98–107)
CHLORIDE BLD-SCNC: 118 MMOL/L (ref 98–107)
CHLORIDE BLD-SCNC: 119 MMOL/L (ref 98–107)
CHLORIDE BLD-SCNC: 120 MMOL/L (ref 98–107)
CHLORIDE BLD-SCNC: 121 MMOL/L (ref 98–107)
CHLORIDE BLD-SCNC: 122 MMOL/L (ref 98–107)
CHLORIDE BLD-SCNC: 123 MMOL/L (ref 98–107)
CHLORIDE BLD-SCNC: 123 MMOL/L (ref 98–107)
CHLORIDE BLD-SCNC: 96 MMOL/L (ref 98–107)
CHLORIDE BLD-SCNC: 97 MMOL/L (ref 98–107)
CHLORIDE URINE RANDOM: <20 MMOL/L
CLARITY: CLEAR
CLARITY: CLEAR
CO2: 11 MMOL/L (ref 22–29)
CO2: 15 MMOL/L (ref 22–29)
CO2: 15 MMOL/L (ref 22–29)
CO2: 17 MMOL/L (ref 22–29)
CO2: 19 MMOL/L (ref 22–29)
CO2: 20 MMOL/L (ref 22–29)
CO2: 21 MMOL/L (ref 22–29)
CO2: 22 MMOL/L (ref 22–29)
CO2: 25 MMOL/L (ref 22–29)
COCAINE METABOLITE SCREEN URINE: NOT DETECTED
COHB: 0.1 % (ref 0–1.5)
COHB: 0.1 % (ref 0–1.5)
COHB: 0.3 % (ref 0–1.5)
COHB: 0.3 % (ref 0–1.5)
COLOR: ABNORMAL
COLOR: YELLOW
CORTISOL TOTAL: 12 MCG/DL (ref 2.68–18.4)
CREAT SERPL-MCNC: 1.1 MG/DL (ref 0.5–1)
CREAT SERPL-MCNC: 1.3 MG/DL (ref 0.5–1)
CREAT SERPL-MCNC: 1.4 MG/DL (ref 0.5–1)
CREAT SERPL-MCNC: 1.4 MG/DL (ref 0.5–1)
CREAT SERPL-MCNC: 1.5 MG/DL (ref 0.5–1)
CREAT SERPL-MCNC: 1.6 MG/DL (ref 0.5–1)
CREAT SERPL-MCNC: 1.7 MG/DL (ref 0.5–1)
CREAT SERPL-MCNC: 1.8 MG/DL (ref 0.5–1)
CREAT SERPL-MCNC: 1.8 MG/DL (ref 0.5–1)
CREATININE URINE: 49 MG/DL (ref 29–226)
CRITICAL: ABNORMAL
CULTURE, BLOOD 2: NORMAL
CULTURE, BLOOD 2: NORMAL
D DIMER: 910 NG/ML DDU
DATE ANALYZED: ABNORMAL
DATE OF COLLECTION: ABNORMAL
DESCRIPTION BLOOD BANK: NORMAL
EKG ATRIAL RATE: 102 BPM
EKG ATRIAL RATE: 300 BPM
EKG ATRIAL RATE: 31 BPM
EKG P AXIS: -72 DEGREES
EKG P-R INTERVAL: 126 MS
EKG Q-T INTERVAL: 340 MS
EKG Q-T INTERVAL: 570 MS
EKG Q-T INTERVAL: 644 MS
EKG QRS DURATION: 62 MS
EKG QRS DURATION: 70 MS
EKG QRS DURATION: 96 MS
EKG QTC CALCULATION (BAZETT): 443 MS
EKG QTC CALCULATION (BAZETT): 464 MS
EKG QTC CALCULATION (BAZETT): 544 MS
EKG R AXIS: 0 DEGREES
EKG R AXIS: 37 DEGREES
EKG R AXIS: 38 DEGREES
EKG T AXIS: -131 DEGREES
EKG T AXIS: 69 DEGREES
EKG T AXIS: 73 DEGREES
EKG VENTRICULAR RATE: 102 BPM
EKG VENTRICULAR RATE: 40 BPM
EKG VENTRICULAR RATE: 43 BPM
EOSINOPHILS ABSOLUTE: 0 E9/L (ref 0.05–0.5)
EOSINOPHILS ABSOLUTE: 0.02 E9/L (ref 0.05–0.5)
EOSINOPHILS ABSOLUTE: 0.02 E9/L (ref 0.05–0.5)
EOSINOPHILS ABSOLUTE: 0.04 E9/L (ref 0.05–0.5)
EOSINOPHILS ABSOLUTE: 0.07 E9/L (ref 0.05–0.5)
EOSINOPHILS RELATIVE PERCENT: 0 % (ref 0–6)
EOSINOPHILS RELATIVE PERCENT: 0 % (ref 0–6)
EOSINOPHILS RELATIVE PERCENT: 0.3 % (ref 0–6)
EOSINOPHILS RELATIVE PERCENT: 0.7 % (ref 0–6)
EOSINOPHILS RELATIVE PERCENT: 1 % (ref 0–6)
EOSINOPHILS RELATIVE PERCENT: 1 % (ref 0–6)
EOSINOPHILS RELATIVE PERCENT: 1.8 % (ref 0–6)
EOSINOPHILS RELATIVE PERCENT: 2.6 % (ref 0–6)
EOSINOPHILS RELATIVE PERCENT: 2.8 % (ref 0–6)
ETHANOL: <10 MG/DL (ref 0–0.08)
FENTANYL SCREEN, URINE: NOT DETECTED
FERRITIN: 296 NG/ML
FIBRINOGEN: 158 MG/DL (ref 225–540)
FIBRINOGEN: 97 MG/DL (ref 225–540)
FIO2: 100 %
FOLATE: 2.3 NG/ML (ref 4.8–24.2)
GFR AFRICAN AMERICAN: 33
GFR AFRICAN AMERICAN: 33
GFR AFRICAN AMERICAN: 35
GFR AFRICAN AMERICAN: 38
GFR AFRICAN AMERICAN: 40
GFR AFRICAN AMERICAN: 44
GFR AFRICAN AMERICAN: 44
GFR AFRICAN AMERICAN: 48
GFR AFRICAN AMERICAN: 58
GFR NON-AFRICAN AMERICAN: 33 ML/MIN/1.73
GFR NON-AFRICAN AMERICAN: 33 ML/MIN/1.73
GFR NON-AFRICAN AMERICAN: 35 ML/MIN/1.73
GFR NON-AFRICAN AMERICAN: 38 ML/MIN/1.73
GFR NON-AFRICAN AMERICAN: 40 ML/MIN/1.73
GFR NON-AFRICAN AMERICAN: 44 ML/MIN/1.73
GFR NON-AFRICAN AMERICAN: 44 ML/MIN/1.73
GFR NON-AFRICAN AMERICAN: 48 ML/MIN/1.73
GFR NON-AFRICAN AMERICAN: 58 ML/MIN/1.73
GLUCOSE BLD-MCNC: 106 MG/DL (ref 74–99)
GLUCOSE BLD-MCNC: 129 MG/DL (ref 74–99)
GLUCOSE BLD-MCNC: 137 MG/DL (ref 74–99)
GLUCOSE BLD-MCNC: 149 MG/DL (ref 74–99)
GLUCOSE BLD-MCNC: 150 MG/DL (ref 74–99)
GLUCOSE BLD-MCNC: 159 MG/DL (ref 74–99)
GLUCOSE BLD-MCNC: 160 MG/DL (ref 74–99)
GLUCOSE BLD-MCNC: 169 MG/DL (ref 74–99)
GLUCOSE BLD-MCNC: 221 MG/DL (ref 74–99)
GLUCOSE BLD-MCNC: 222 MG/DL (ref 74–99)
GLUCOSE BLD-MCNC: 228 MG/DL (ref 74–99)
GLUCOSE BLD-MCNC: 243 MG/DL (ref 74–99)
GLUCOSE BLD-MCNC: 277 MG/DL (ref 74–99)
GLUCOSE BLD-MCNC: 379 MG/DL (ref 74–99)
GLUCOSE URINE: NEGATIVE MG/DL
GLUCOSE URINE: NEGATIVE MG/DL
HBA1C MFR BLD: 5.1 % (ref 4–5.6)
HCO3: 16.8 MMOL/L (ref 22–26)
HCO3: 17.7 MMOL/L (ref 22–26)
HCO3: 19.7 MMOL/L (ref 22–26)
HCO3: 22.1 MMOL/L (ref 22–26)
HCT VFR BLD CALC: 19.8 % (ref 34–48)
HCT VFR BLD CALC: 20.4 % (ref 34–48)
HCT VFR BLD CALC: 20.8 % (ref 34–48)
HCT VFR BLD CALC: 23.2 % (ref 34–48)
HCT VFR BLD CALC: 23.6 % (ref 34–48)
HCT VFR BLD CALC: 23.9 % (ref 34–48)
HCT VFR BLD CALC: 25.8 % (ref 34–48)
HCT VFR BLD CALC: 25.9 % (ref 34–48)
HCT VFR BLD CALC: 26.1 % (ref 34–48)
HCT VFR BLD CALC: 26.8 % (ref 34–48)
HCT VFR BLD CALC: 28.1 % (ref 34–48)
HEMOGLOBIN: 10.2 G/DL (ref 11.5–15.5)
HEMOGLOBIN: 7.1 G/DL (ref 11.5–15.5)
HEMOGLOBIN: 7.1 G/DL (ref 11.5–15.5)
HEMOGLOBIN: 7.3 G/DL (ref 11.5–15.5)
HEMOGLOBIN: 7.4 G/DL (ref 11.5–15.5)
HEMOGLOBIN: 8 G/DL (ref 11.5–15.5)
HEMOGLOBIN: 8.3 G/DL (ref 11.5–15.5)
HEMOGLOBIN: 8.3 G/DL (ref 11.5–15.5)
HEMOGLOBIN: 8.5 G/DL (ref 11.5–15.5)
HEMOGLOBIN: 9.1 G/DL (ref 11.5–15.5)
HEMOGLOBIN: 9.2 G/DL (ref 11.5–15.5)
HHB: 0.7 % (ref 0–5)
HHB: 0.7 % (ref 0–5)
HHB: 1.6 % (ref 0–5)
HHB: 11 % (ref 0–5)
HYPOCHROMIA: ABNORMAL
IMMATURE RETIC FRACT: 34.5 % (ref 3–15.9)
INR BLD: 1.7
INR BLD: 1.7
INR BLD: 1.8
INR BLD: 1.8
IRON SATURATION: 112 % (ref 15–50)
IRON: 105 MCG/DL (ref 37–145)
KETONES, URINE: NEGATIVE MG/DL
KETONES, URINE: NEGATIVE MG/DL
LAB: ABNORMAL
LACTATE DEHYDROGENASE: 201 U/L (ref 135–214)
LACTATE DEHYDROGENASE: 664 U/L (ref 135–214)
LACTIC ACID, SEPSIS: 1.7 MMOL/L (ref 0.5–1.9)
LACTIC ACID, SEPSIS: 2.5 MMOL/L (ref 0.5–1.9)
LACTIC ACID, SEPSIS: 3.7 MMOL/L (ref 0.5–1.9)
LACTIC ACID: 4.1 MMOL/L (ref 0.5–2.2)
LEUKOCYTE ESTERASE, URINE: ABNORMAL
LEUKOCYTE ESTERASE, URINE: NEGATIVE
LYMPHOCYTES ABSOLUTE: 0 E9/L (ref 1.5–4)
LYMPHOCYTES ABSOLUTE: 0.12 E9/L (ref 1.5–4)
LYMPHOCYTES ABSOLUTE: 0.23 E9/L (ref 1.5–4)
LYMPHOCYTES ABSOLUTE: 0.24 E9/L (ref 1.5–4)
LYMPHOCYTES ABSOLUTE: 0.36 E9/L (ref 1.5–4)
LYMPHOCYTES ABSOLUTE: 0.37 E9/L (ref 1.5–4)
LYMPHOCYTES ABSOLUTE: 0.41 E9/L (ref 1.5–4)
LYMPHOCYTES ABSOLUTE: 0.42 E9/L (ref 1.5–4)
LYMPHOCYTES ABSOLUTE: 0.45 E9/L (ref 1.5–4)
LYMPHOCYTES RELATIVE PERCENT: 15.9 % (ref 20–42)
LYMPHOCYTES RELATIVE PERCENT: 16 % (ref 20–42)
LYMPHOCYTES RELATIVE PERCENT: 20 % (ref 20–42)
LYMPHOCYTES RELATIVE PERCENT: 4.4 % (ref 20–42)
LYMPHOCYTES RELATIVE PERCENT: 6.3 % (ref 20–42)
LYMPHOCYTES RELATIVE PERCENT: 8.7 % (ref 20–42)
LYMPHOCYTES RELATIVE PERCENT: 8.7 % (ref 20–42)
LYMPHOCYTES RELATIVE PERCENT: 8.8 % (ref 20–42)
LYMPHOCYTES RELATIVE PERCENT: 9.6 % (ref 20–42)
Lab: ABNORMAL
Lab: NORMAL
MAGNESIUM: 1.6 MG/DL (ref 1.6–2.6)
MAGNESIUM: 2.1 MG/DL (ref 1.6–2.6)
MAGNESIUM: 2.3 MG/DL (ref 1.6–2.6)
MCH RBC QN AUTO: 32.4 PG (ref 26–35)
MCH RBC QN AUTO: 32.6 PG (ref 26–35)
MCH RBC QN AUTO: 32.6 PG (ref 26–35)
MCH RBC QN AUTO: 32.9 PG (ref 26–35)
MCH RBC QN AUTO: 32.9 PG (ref 26–35)
MCH RBC QN AUTO: 33.1 PG (ref 26–35)
MCH RBC QN AUTO: 33.2 PG (ref 26–35)
MCH RBC QN AUTO: 33.5 PG (ref 26–35)
MCH RBC QN AUTO: 33.8 PG (ref 26–35)
MCHC RBC AUTO-ENTMCNC: 31 % (ref 32–34.5)
MCHC RBC AUTO-ENTMCNC: 31.7 % (ref 32–34.5)
MCHC RBC AUTO-ENTMCNC: 31.8 % (ref 32–34.5)
MCHC RBC AUTO-ENTMCNC: 33.9 % (ref 32–34.5)
MCHC RBC AUTO-ENTMCNC: 34.8 % (ref 32–34.5)
MCHC RBC AUTO-ENTMCNC: 35.1 % (ref 32–34.5)
MCHC RBC AUTO-ENTMCNC: 35.3 % (ref 32–34.5)
MCHC RBC AUTO-ENTMCNC: 35.5 % (ref 32–34.5)
MCHC RBC AUTO-ENTMCNC: 35.8 % (ref 32–34.5)
MCHC RBC AUTO-ENTMCNC: 35.9 % (ref 32–34.5)
MCHC RBC AUTO-ENTMCNC: 36.3 % (ref 32–34.5)
MCV RBC AUTO: 103.9 FL (ref 80–99.9)
MCV RBC AUTO: 104.4 FL (ref 80–99.9)
MCV RBC AUTO: 106.2 FL (ref 80–99.9)
MCV RBC AUTO: 91.5 FL (ref 80–99.9)
MCV RBC AUTO: 91.8 FL (ref 80–99.9)
MCV RBC AUTO: 92.9 FL (ref 80–99.9)
MCV RBC AUTO: 93.2 FL (ref 80–99.9)
MCV RBC AUTO: 93.5 FL (ref 80–99.9)
MCV RBC AUTO: 93.6 FL (ref 80–99.9)
MCV RBC AUTO: 94.3 FL (ref 80–99.9)
MCV RBC AUTO: 97.9 FL (ref 80–99.9)
METAMYELOCYTES RELATIVE PERCENT: 1.7 % (ref 0–1)
METER GLUCOSE: 103 MG/DL (ref 74–99)
METER GLUCOSE: 103 MG/DL (ref 74–99)
METER GLUCOSE: 104 MG/DL (ref 74–99)
METER GLUCOSE: 106 MG/DL (ref 74–99)
METER GLUCOSE: 131 MG/DL (ref 74–99)
METER GLUCOSE: 131 MG/DL (ref 74–99)
METER GLUCOSE: 134 MG/DL (ref 74–99)
METER GLUCOSE: 134 MG/DL (ref 74–99)
METER GLUCOSE: 136 MG/DL (ref 74–99)
METER GLUCOSE: 139 MG/DL (ref 74–99)
METER GLUCOSE: 140 MG/DL (ref 74–99)
METER GLUCOSE: 140 MG/DL (ref 74–99)
METER GLUCOSE: 142 MG/DL (ref 74–99)
METER GLUCOSE: 146 MG/DL (ref 74–99)
METER GLUCOSE: 152 MG/DL (ref 74–99)
METER GLUCOSE: 157 MG/DL (ref 74–99)
METER GLUCOSE: 158 MG/DL (ref 74–99)
METER GLUCOSE: 163 MG/DL (ref 74–99)
METER GLUCOSE: 165 MG/DL (ref 74–99)
METER GLUCOSE: 170 MG/DL (ref 74–99)
METER GLUCOSE: 178 MG/DL (ref 74–99)
METER GLUCOSE: 187 MG/DL (ref 74–99)
METER GLUCOSE: 190 MG/DL (ref 74–99)
METER GLUCOSE: 191 MG/DL (ref 74–99)
METER GLUCOSE: 199 MG/DL (ref 74–99)
METER GLUCOSE: 203 MG/DL (ref 74–99)
METER GLUCOSE: 209 MG/DL (ref 74–99)
METER GLUCOSE: 217 MG/DL (ref 74–99)
METER GLUCOSE: 220 MG/DL (ref 74–99)
METER GLUCOSE: 224 MG/DL (ref 74–99)
METER GLUCOSE: 226 MG/DL (ref 74–99)
METER GLUCOSE: 238 MG/DL (ref 74–99)
METER GLUCOSE: 239 MG/DL (ref 74–99)
METER GLUCOSE: 241 MG/DL (ref 74–99)
METER GLUCOSE: 241 MG/DL (ref 74–99)
METER GLUCOSE: 285 MG/DL (ref 74–99)
METER GLUCOSE: 289 MG/DL (ref 74–99)
METER GLUCOSE: 299 MG/DL (ref 74–99)
METER GLUCOSE: 326 MG/DL (ref 74–99)
METER GLUCOSE: 339 MG/DL (ref 74–99)
METER GLUCOSE: 347 MG/DL (ref 74–99)
METER GLUCOSE: 356 MG/DL (ref 74–99)
METER GLUCOSE: 388 MG/DL (ref 74–99)
METER GLUCOSE: 398 MG/DL (ref 74–99)
METER GLUCOSE: 40 MG/DL (ref 74–99)
METER GLUCOSE: 50 MG/DL (ref 74–99)
METER GLUCOSE: 54 MG/DL (ref 74–99)
METER GLUCOSE: 55 MG/DL (ref 74–99)
METER GLUCOSE: 61 MG/DL (ref 74–99)
METER GLUCOSE: 61 MG/DL (ref 74–99)
METER GLUCOSE: 63 MG/DL (ref 74–99)
METER GLUCOSE: 63 MG/DL (ref 74–99)
METER GLUCOSE: 65 MG/DL (ref 74–99)
METER GLUCOSE: 69 MG/DL (ref 74–99)
METER GLUCOSE: 70 MG/DL (ref 74–99)
METER GLUCOSE: 75 MG/DL (ref 74–99)
METER GLUCOSE: 76 MG/DL (ref 74–99)
METER GLUCOSE: 82 MG/DL (ref 74–99)
METER GLUCOSE: 83 MG/DL (ref 74–99)
METER GLUCOSE: 87 MG/DL (ref 74–99)
METER GLUCOSE: 90 MG/DL (ref 74–99)
METER GLUCOSE: 97 MG/DL (ref 74–99)
METER GLUCOSE: <40 MG/DL (ref 74–99)
METER GLUCOSE: ABNORMAL MG/DL (ref 74–99)
METER GLUCOSE: ABNORMAL MG/DL (ref 74–99)
METHADONE SCREEN, URINE: NOT DETECTED
METHB: 0.4 % (ref 0–1.5)
METHB: 0.5 % (ref 0–1.5)
METHB: 0.6 % (ref 0–1.5)
METHB: 0.7 % (ref 0–1.5)
MODE: ABNORMAL
MONOCYTES ABSOLUTE: 0.06 E9/L (ref 0.1–0.95)
MONOCYTES ABSOLUTE: 0.08 E9/L (ref 0.1–0.95)
MONOCYTES ABSOLUTE: 0.08 E9/L (ref 0.1–0.95)
MONOCYTES ABSOLUTE: 0.09 E9/L (ref 0.1–0.95)
MONOCYTES ABSOLUTE: 0.09 E9/L (ref 0.1–0.95)
MONOCYTES ABSOLUTE: 0.1 E9/L (ref 0.1–0.95)
MONOCYTES ABSOLUTE: 0.11 E9/L (ref 0.1–0.95)
MONOCYTES ABSOLUTE: 0.12 E9/L (ref 0.1–0.95)
MONOCYTES ABSOLUTE: 0.24 E9/L (ref 0.1–0.95)
MONOCYTES RELATIVE PERCENT: 1.7 % (ref 2–12)
MONOCYTES RELATIVE PERCENT: 1.7 % (ref 2–12)
MONOCYTES RELATIVE PERCENT: 2.6 % (ref 2–12)
MONOCYTES RELATIVE PERCENT: 3 % (ref 2–12)
MONOCYTES RELATIVE PERCENT: 3.5 % (ref 2–12)
MONOCYTES RELATIVE PERCENT: 4 % (ref 2–12)
MONOCYTES RELATIVE PERCENT: 4.4 % (ref 2–12)
MONOCYTES RELATIVE PERCENT: 4.4 % (ref 2–12)
MONOCYTES RELATIVE PERCENT: 6.1 % (ref 2–12)
MYELOCYTE PERCENT: 1.8 % (ref 0–0)
NEUTROPHILS ABSOLUTE: 1.6 E9/L (ref 1.8–7.3)
NEUTROPHILS ABSOLUTE: 1.79 E9/L (ref 1.8–7.3)
NEUTROPHILS ABSOLUTE: 1.99 E9/L (ref 1.8–7.3)
NEUTROPHILS ABSOLUTE: 2.21 E9/L (ref 1.8–7.3)
NEUTROPHILS ABSOLUTE: 2.24 E9/L (ref 1.8–7.3)
NEUTROPHILS ABSOLUTE: 2.76 E9/L (ref 1.8–7.3)
NEUTROPHILS ABSOLUTE: 3.4 E9/L (ref 1.8–7.3)
NEUTROPHILS ABSOLUTE: 3.72 E9/L (ref 1.8–7.3)
NEUTROPHILS ABSOLUTE: 4.14 E9/L (ref 1.8–7.3)
NEUTROPHILS RELATIVE PERCENT: 76 % (ref 43–80)
NEUTROPHILS RELATIVE PERCENT: 78 % (ref 43–80)
NEUTROPHILS RELATIVE PERCENT: 79.6 % (ref 43–80)
NEUTROPHILS RELATIVE PERCENT: 82.5 % (ref 43–80)
NEUTROPHILS RELATIVE PERCENT: 83.5 % (ref 43–80)
NEUTROPHILS RELATIVE PERCENT: 85.1 % (ref 43–80)
NEUTROPHILS RELATIVE PERCENT: 89.6 % (ref 43–80)
NEUTROPHILS RELATIVE PERCENT: 90.3 % (ref 43–80)
NEUTROPHILS RELATIVE PERCENT: 98.3 % (ref 43–80)
NITRITE, URINE: NEGATIVE
NITRITE, URINE: POSITIVE
NUCLEATED RED BLOOD CELLS: 1.8 /100 WBC
NUCLEATED RED BLOOD CELLS: 3.5 /100 WBC
NUCLEATED RED BLOOD CELLS: 4.3 /100 WBC
NUCLEATED RED BLOOD CELLS: 4.4 /100 WBC
NUCLEATED RED BLOOD CELLS: 5 /100 WBC
NUCLEATED RED BLOOD CELLS: 5 /100 WBC
NUCLEATED RED BLOOD CELLS: 6.1 /100 WBC
O2 CONTENT: 11.7 ML/DL
O2 CONTENT: 12 ML/DL
O2 CONTENT: 12.9 ML/DL
O2 CONTENT: 14.4 ML/DL
O2 SATURATION: 88.9 % (ref 92–98.5)
O2 SATURATION: 98.4 % (ref 92–98.5)
O2 SATURATION: 99.3 % (ref 92–98.5)
O2 SATURATION: 99.3 % (ref 92–98.5)
O2HB: 88.3 % (ref 94–97)
O2HB: 97.8 % (ref 94–97)
O2HB: 98.3 % (ref 94–97)
O2HB: 98.6 % (ref 94–97)
OPERATOR ID: 2860
OPERATOR ID: 2860
OPERATOR ID: ABNORMAL
OPERATOR ID: ABNORMAL
OPIATE SCREEN URINE: NOT DETECTED
OSMOLALITY: 267 MOSM/KG (ref 285–310)
OVALOCYTES: ABNORMAL
OXYCODONE URINE: NOT DETECTED
PAPPENHEIMER BODIES: ABNORMAL
PATHOLOGIST REVIEW: NORMAL
PATIENT TEMP: 37 C
PCO2: 24.6 MMHG (ref 35–45)
PCO2: 25.5 MMHG (ref 35–45)
PCO2: 28.6 MMHG (ref 35–45)
PCO2: 33.3 MMHG (ref 35–45)
PDW BLD-RTO: 16.3 FL (ref 11.5–15)
PDW BLD-RTO: 16.5 FL (ref 11.5–15)
PDW BLD-RTO: 16.8 FL (ref 11.5–15)
PDW BLD-RTO: 17.4 FL (ref 11.5–15)
PDW BLD-RTO: 17.7 FL (ref 11.5–15)
PDW BLD-RTO: 18 FL (ref 11.5–15)
PDW BLD-RTO: 18 FL (ref 11.5–15)
PDW BLD-RTO: 18.1 FL (ref 11.5–15)
PDW BLD-RTO: 20.9 FL (ref 11.5–15)
PDW BLD-RTO: 21.1 FL (ref 11.5–15)
PDW BLD-RTO: 21.1 FL (ref 11.5–15)
PEEP/CPAP: 8 CMH2O
PFO2: 3.5 MMHG/%
PH BLOOD GAS: 7.39 (ref 7.35–7.45)
PH BLOOD GAS: 7.41 (ref 7.35–7.45)
PH BLOOD GAS: 7.45 (ref 7.35–7.45)
PH BLOOD GAS: 7.56 (ref 7.35–7.45)
PH UA: 5 (ref 5–9)
PH UA: 5.5 (ref 5–9)
PHENCYCLIDINE SCREEN URINE: NOT DETECTED
PHOSPHORUS: 3.4 MG/DL (ref 2.5–4.5)
PIP: 15 CMH2O
PLATELET # BLD: 134 E9/L (ref 130–450)
PLATELET # BLD: 140 E9/L (ref 130–450)
PLATELET # BLD: 159 E9/L (ref 130–450)
PLATELET # BLD: 16 E9/L (ref 130–450)
PLATELET # BLD: 22 E9/L (ref 130–450)
PLATELET # BLD: 33 E9/L (ref 130–450)
PLATELET # BLD: 36 E9/L (ref 130–450)
PLATELET # BLD: 37 E9/L (ref 130–450)
PLATELET # BLD: 37 E9/L (ref 130–450)
PLATELET # BLD: 40 E9/L (ref 130–450)
PLATELET # BLD: 50 E9/L (ref 130–450)
PLATELET CONFIRMATION: NORMAL
PMV BLD AUTO: 10.1 FL (ref 7–12)
PMV BLD AUTO: 10.7 FL (ref 7–12)
PMV BLD AUTO: 11.5 FL (ref 7–12)
PMV BLD AUTO: 11.9 FL (ref 7–12)
PMV BLD AUTO: 12.2 FL (ref 7–12)
PMV BLD AUTO: ABNORMAL FL (ref 7–12)
PO2: 133.1 MMHG (ref 75–100)
PO2: 349.7 MMHG (ref 75–100)
PO2: 414.7 MMHG (ref 75–100)
PO2: 56.5 MMHG (ref 75–100)
POIKILOCYTES: ABNORMAL
POLYCHROMASIA: ABNORMAL
POTASSIUM REFLEX MAGNESIUM: 2.9 MMOL/L (ref 3.5–5)
POTASSIUM REFLEX MAGNESIUM: 3.1 MMOL/L (ref 3.5–5)
POTASSIUM SERPL-SCNC: 3.1 MMOL/L (ref 3.5–5)
POTASSIUM SERPL-SCNC: 3.3 MMOL/L (ref 3.5–5)
POTASSIUM SERPL-SCNC: 3.34 MMOL/L (ref 3.5–5)
POTASSIUM SERPL-SCNC: 3.5 MMOL/L (ref 3.5–5)
POTASSIUM SERPL-SCNC: 3.6 MMOL/L (ref 3.5–5)
POTASSIUM SERPL-SCNC: 4.1 MMOL/L (ref 3.5–5)
POTASSIUM SERPL-SCNC: 4.2 MMOL/L (ref 3.5–5)
POTASSIUM SERPL-SCNC: 4.28 MMOL/L (ref 3.5–5)
POTASSIUM SERPL-SCNC: 4.4 MMOL/L (ref 3.5–5)
POTASSIUM SERPL-SCNC: 4.7 MMOL/L (ref 3.5–5)
POTASSIUM SERPL-SCNC: 5.5 MMOL/L (ref 3.5–5)
POTASSIUM, UR: 14.6 MMOL/L
PRO-BNP: 277 PG/ML (ref 0–450)
PRO-BNP: 309 PG/ML (ref 0–450)
PROCALCITONIN: 0.49 NG/ML (ref 0–0.08)
PROCALCITONIN: 0.56 NG/ML (ref 0–0.08)
PROTEIN UA: ABNORMAL MG/DL
PROTEIN UA: NEGATIVE MG/DL
PROTHROMBIN TIME: 19.5 SEC (ref 9.3–12.4)
PROTHROMBIN TIME: 19.7 SEC (ref 9.3–12.4)
PROTHROMBIN TIME: 20.6 SEC (ref 9.3–12.4)
PROTHROMBIN TIME: 20.7 SEC (ref 9.3–12.4)
RBC # BLD: 2.1 E12/L (ref 3.5–5.5)
RBC # BLD: 2.18 E12/L (ref 3.5–5.5)
RBC # BLD: 2.24 E12/L (ref 3.5–5.5)
RBC # BLD: 2.25 E12/L (ref 3.5–5.5)
RBC # BLD: 2.41 E12/L (ref 3.5–5.5)
RBC # BLD: 2.48 E12/L (ref 3.5–5.5)
RBC # BLD: 2.5 E12/L (ref 3.5–5.5)
RBC # BLD: 2.58 E12/L (ref 3.5–5.5)
RBC # BLD: 2.78 E12/L (ref 3.5–5.5)
RBC # BLD: 2.81 E12/L (ref 3.5–5.5)
RBC # BLD: 3.07 E12/L (ref 3.5–5.5)
RBC UA: ABNORMAL /HPF (ref 0–2)
RBC UA: NORMAL /HPF (ref 0–2)
REASON FOR REJECTION: NORMAL
REJECTED TEST: NORMAL
RETIC HGB EQUIVALENT: 24 PG (ref 28.2–36.6)
RETICULOCYTE ABSOLUTE COUNT: 0.08 E12/L
RETICULOCYTE COUNT PCT: 3.7 % (ref 0.4–1.9)
RI(T): 0.89
SALICYLATE, SERUM: <0.3 MG/DL (ref 0–30)
SARS-COV-2, NAAT: NOT DETECTED
SARS-COV-2, NAAT: NOT DETECTED
SCHISTOCYTES: ABNORMAL
SODIUM BLD-SCNC: 125 MMOL/L (ref 132–146)
SODIUM BLD-SCNC: 126 MMOL/L (ref 132–146)
SODIUM BLD-SCNC: 128 MMOL/L (ref 132–146)
SODIUM BLD-SCNC: 134 MMOL/L (ref 132–146)
SODIUM BLD-SCNC: 135 MMOL/L (ref 132–146)
SODIUM BLD-SCNC: 140 MMOL/L (ref 132–146)
SODIUM BLD-SCNC: 150 MMOL/L (ref 132–146)
SODIUM BLD-SCNC: 150 MMOL/L (ref 132–146)
SODIUM BLD-SCNC: 151 MMOL/L (ref 132–146)
SODIUM BLD-SCNC: 151 MMOL/L (ref 132–146)
SODIUM BLD-SCNC: 153 MMOL/L (ref 132–146)
SODIUM BLD-SCNC: 154 MMOL/L (ref 132–146)
SODIUM BLD-SCNC: 158 MMOL/L (ref 132–146)
SODIUM URINE: <20 MMOL/L
SOURCE, BLOOD GAS: ABNORMAL
SPECIFIC GRAVITY UA: 1.01 (ref 1–1.03)
SPECIFIC GRAVITY UA: 1.02 (ref 1–1.03)
SPHEROCYTES: ABNORMAL
TARGET CELLS: ABNORMAL
TEAR DROP CELLS: ABNORMAL
THB: 8.5 G/DL (ref 11.5–16.5)
THB: 8.5 G/DL (ref 11.5–16.5)
THB: 9.4 G/DL (ref 11.5–16.5)
THB: 9.7 G/DL (ref 11.5–16.5)
TIME ANALYZED: 1246
TIME ANALYZED: 1609
TIME ANALYZED: 343
TIME ANALYZED: 831
TOTAL CK: 136 U/L (ref 20–180)
TOTAL IRON BINDING CAPACITY: 94 MCG/DL (ref 250–450)
TOTAL PROTEIN: 4.4 G/DL (ref 6.4–8.3)
TOTAL PROTEIN: 5.2 G/DL (ref 6.4–8.3)
TOTAL PROTEIN: 5.4 G/DL (ref 6.4–8.3)
TOTAL PROTEIN: 5.6 G/DL (ref 6.4–8.3)
TOTAL PROTEIN: 5.8 G/DL (ref 6.4–8.3)
TRICYCLIC ANTIDEPRESSANTS SCREEN SERUM: NEGATIVE NG/ML
TROPONIN: 0.09 NG/ML (ref 0–0.03)
TROPONIN: 0.11 NG/ML (ref 0–0.03)
TROPONIN: 0.15 NG/ML (ref 0–0.03)
TROPONIN: <0.01 NG/ML (ref 0–0.03)
TROPONIN: <0.01 NG/ML (ref 0–0.03)
TSH SERPL DL<=0.05 MIU/L-ACNC: 3.19 UIU/ML (ref 0.27–4.2)
TSH SERPL DL<=0.05 MIU/L-ACNC: 3.47 UIU/ML (ref 0.27–4.2)
URINE CULTURE, ROUTINE: NORMAL
UROBILINOGEN, URINE: 0.2 E.U./DL
UROBILINOGEN, URINE: 2 E.U./DL
VITAMIN B-12: 1634 PG/ML (ref 211–946)
WBC # BLD: 2.1 E9/L (ref 4.5–11.5)
WBC # BLD: 2.3 E9/L (ref 4.5–11.5)
WBC # BLD: 2.4 E9/L (ref 4.5–11.5)
WBC # BLD: 2.6 E9/L (ref 4.5–11.5)
WBC # BLD: 2.8 E9/L (ref 4.5–11.5)
WBC # BLD: 3 E9/L (ref 4.5–11.5)
WBC # BLD: 3.7 E9/L (ref 4.5–11.5)
WBC # BLD: 3.8 E9/L (ref 4.5–11.5)
WBC # BLD: 4 E9/L (ref 4.5–11.5)
WBC # BLD: 4.5 E9/L (ref 4.5–11.5)
WBC # BLD: 4.6 E9/L (ref 4.5–11.5)
WBC UA: ABNORMAL /HPF (ref 0–5)
WBC UA: NORMAL /HPF (ref 0–5)

## 2021-01-01 PROCEDURE — 93010 ELECTROCARDIOGRAM REPORT: CPT | Performed by: INTERNAL MEDICINE

## 2021-01-01 PROCEDURE — 94640 AIRWAY INHALATION TREATMENT: CPT

## 2021-01-01 PROCEDURE — 82962 GLUCOSE BLOOD TEST: CPT

## 2021-01-01 PROCEDURE — 92611 MOTION FLUOROSCOPY/SWALLOW: CPT | Performed by: SPEECH-LANGUAGE PATHOLOGIST

## 2021-01-01 PROCEDURE — 36415 COLL VENOUS BLD VENIPUNCTURE: CPT

## 2021-01-01 PROCEDURE — 6360000002 HC RX W HCPCS: Performed by: EMERGENCY MEDICINE

## 2021-01-01 PROCEDURE — 84484 ASSAY OF TROPONIN QUANT: CPT

## 2021-01-01 PROCEDURE — 83735 ASSAY OF MAGNESIUM: CPT

## 2021-01-01 PROCEDURE — 94664 DEMO&/EVAL PT USE INHALER: CPT

## 2021-01-01 PROCEDURE — 86901 BLOOD TYPING SEROLOGIC RH(D): CPT

## 2021-01-01 PROCEDURE — 6370000000 HC RX 637 (ALT 250 FOR IP): Performed by: SURGERY

## 2021-01-01 PROCEDURE — 6360000002 HC RX W HCPCS: Performed by: NURSE PRACTITIONER

## 2021-01-01 PROCEDURE — 87088 URINE BACTERIA CULTURE: CPT

## 2021-01-01 PROCEDURE — 85025 COMPLETE CBC W/AUTO DIFF WBC: CPT

## 2021-01-01 PROCEDURE — 6370000000 HC RX 637 (ALT 250 FOR IP): Performed by: INTERNAL MEDICINE

## 2021-01-01 PROCEDURE — 2580000003 HC RX 258: Performed by: INTERNAL MEDICINE

## 2021-01-01 PROCEDURE — 82607 VITAMIN B-12: CPT

## 2021-01-01 PROCEDURE — 99232 SBSQ HOSP IP/OBS MODERATE 35: CPT | Performed by: FAMILY MEDICINE

## 2021-01-01 PROCEDURE — 2500000003 HC RX 250 WO HCPCS: Performed by: INTERNAL MEDICINE

## 2021-01-01 PROCEDURE — 6370000000 HC RX 637 (ALT 250 FOR IP): Performed by: EMERGENCY MEDICINE

## 2021-01-01 PROCEDURE — 2060000000 HC ICU INTERMEDIATE R&B

## 2021-01-01 PROCEDURE — 80053 COMPREHEN METABOLIC PANEL: CPT

## 2021-01-01 PROCEDURE — 86850 RBC ANTIBODY SCREEN: CPT

## 2021-01-01 PROCEDURE — P9035 PLATELET PHERES LEUKOREDUCED: HCPCS

## 2021-01-01 PROCEDURE — 82746 ASSAY OF FOLIC ACID SERUM: CPT

## 2021-01-01 PROCEDURE — 97530 THERAPEUTIC ACTIVITIES: CPT

## 2021-01-01 PROCEDURE — 80048 BASIC METABOLIC PNL TOTAL CA: CPT

## 2021-01-01 PROCEDURE — 74177 CT ABD & PELVIS W/CONTRAST: CPT

## 2021-01-01 PROCEDURE — 6370000000 HC RX 637 (ALT 250 FOR IP): Performed by: FAMILY MEDICINE

## 2021-01-01 PROCEDURE — 82550 ASSAY OF CK (CPK): CPT

## 2021-01-01 PROCEDURE — 85730 THROMBOPLASTIN TIME PARTIAL: CPT

## 2021-01-01 PROCEDURE — 6370000000 HC RX 637 (ALT 250 FOR IP): Performed by: NURSE PRACTITIONER

## 2021-01-01 PROCEDURE — 85384 FIBRINOGEN ACTIVITY: CPT

## 2021-01-01 PROCEDURE — 6360000002 HC RX W HCPCS: Performed by: INTERNAL MEDICINE

## 2021-01-01 PROCEDURE — 2700000000 HC OXYGEN THERAPY PER DAY

## 2021-01-01 PROCEDURE — 99223 1ST HOSP IP/OBS HIGH 75: CPT | Performed by: SURGERY

## 2021-01-01 PROCEDURE — 92526 ORAL FUNCTION THERAPY: CPT

## 2021-01-01 PROCEDURE — 2580000003 HC RX 258: Performed by: RADIOLOGY

## 2021-01-01 PROCEDURE — U0002 COVID-19 LAB TEST NON-CDC: HCPCS

## 2021-01-01 PROCEDURE — 93005 ELECTROCARDIOGRAM TRACING: CPT | Performed by: EMERGENCY MEDICINE

## 2021-01-01 PROCEDURE — 97165 OT EVAL LOW COMPLEX 30 MIN: CPT

## 2021-01-01 PROCEDURE — 2580000003 HC RX 258: Performed by: NURSE PRACTITIONER

## 2021-01-01 PROCEDURE — 93970 EXTREMITY STUDY: CPT

## 2021-01-01 PROCEDURE — 99231 SBSQ HOSP IP/OBS SF/LOW 25: CPT | Performed by: FAMILY MEDICINE

## 2021-01-01 PROCEDURE — 2580000003 HC RX 258: Performed by: SURGERY

## 2021-01-01 PROCEDURE — 82728 ASSAY OF FERRITIN: CPT

## 2021-01-01 PROCEDURE — 83615 LACTATE (LD) (LDH) ENZYME: CPT

## 2021-01-01 PROCEDURE — 85378 FIBRIN DEGRADE SEMIQUANT: CPT

## 2021-01-01 PROCEDURE — 06H03DZ INSERTION OF INTRALUMINAL DEVICE INTO INFERIOR VENA CAVA, PERCUTANEOUS APPROACH: ICD-10-PCS | Performed by: SURGERY

## 2021-01-01 PROCEDURE — 85610 PROTHROMBIN TIME: CPT

## 2021-01-01 PROCEDURE — 82140 ASSAY OF AMMONIA: CPT

## 2021-01-01 PROCEDURE — 6360000002 HC RX W HCPCS: Performed by: FAMILY MEDICINE

## 2021-01-01 PROCEDURE — 6360000002 HC RX W HCPCS: Performed by: STUDENT IN AN ORGANIZED HEALTH CARE EDUCATION/TRAINING PROGRAM

## 2021-01-01 PROCEDURE — 85027 COMPLETE CBC AUTOMATED: CPT

## 2021-01-01 PROCEDURE — 84300 ASSAY OF URINE SODIUM: CPT

## 2021-01-01 PROCEDURE — 6360000004 HC RX CONTRAST MEDICATION: Performed by: RADIOLOGY

## 2021-01-01 PROCEDURE — 3700000000 HC ANESTHESIA ATTENDED CARE: Performed by: SURGERY

## 2021-01-01 PROCEDURE — 80143 DRUG ASSAY ACETAMINOPHEN: CPT

## 2021-01-01 PROCEDURE — P9016 RBC LEUKOCYTES REDUCED: HCPCS

## 2021-01-01 PROCEDURE — 99285 EMERGENCY DEPT VISIT HI MDM: CPT

## 2021-01-01 PROCEDURE — 1200000000 HC SEMI PRIVATE

## 2021-01-01 PROCEDURE — 2500000003 HC RX 250 WO HCPCS: Performed by: SURGERY

## 2021-01-01 PROCEDURE — 6360000002 HC RX W HCPCS: Performed by: SURGERY

## 2021-01-01 PROCEDURE — 84443 ASSAY THYROID STIM HORMONE: CPT

## 2021-01-01 PROCEDURE — 87040 BLOOD CULTURE FOR BACTERIA: CPT

## 2021-01-01 PROCEDURE — 3600000012 HC SURGERY LEVEL 2 ADDTL 15MIN: Performed by: SURGERY

## 2021-01-01 PROCEDURE — 82947 ASSAY GLUCOSE BLOOD QUANT: CPT

## 2021-01-01 PROCEDURE — C1894 INTRO/SHEATH, NON-LASER: HCPCS | Performed by: SURGERY

## 2021-01-01 PROCEDURE — 82533 TOTAL CORTISOL: CPT

## 2021-01-01 PROCEDURE — 80179 DRUG ASSAY SALICYLATE: CPT

## 2021-01-01 PROCEDURE — 36430 TRANSFUSION BLD/BLD COMPNT: CPT

## 2021-01-01 PROCEDURE — 6360000002 HC RX W HCPCS: Performed by: NEUROLOGICAL SURGERY

## 2021-01-01 PROCEDURE — 99231 SBSQ HOSP IP/OBS SF/LOW 25: CPT | Performed by: NURSE PRACTITIONER

## 2021-01-01 PROCEDURE — 37191 INS ENDOVAS VENA CAVA FILTR: CPT | Performed by: SURGERY

## 2021-01-01 PROCEDURE — 82805 BLOOD GASES W/O2 SATURATION: CPT

## 2021-01-01 PROCEDURE — 74176 CT ABD & PELVIS W/O CONTRAST: CPT

## 2021-01-01 PROCEDURE — 2500000003 HC RX 250 WO HCPCS

## 2021-01-01 PROCEDURE — 99222 1ST HOSP IP/OBS MODERATE 55: CPT | Performed by: NURSE PRACTITIONER

## 2021-01-01 PROCEDURE — 97162 PT EVAL MOD COMPLEX 30 MIN: CPT

## 2021-01-01 PROCEDURE — 71045 X-RAY EXAM CHEST 1 VIEW: CPT

## 2021-01-01 PROCEDURE — 83605 ASSAY OF LACTIC ACID: CPT

## 2021-01-01 PROCEDURE — 2500000003 HC RX 250 WO HCPCS: Performed by: NURSE PRACTITIONER

## 2021-01-01 PROCEDURE — 93970 EXTREMITY STUDY: CPT | Performed by: RADIOLOGY

## 2021-01-01 PROCEDURE — 2709999900 HC NON-CHARGEABLE SUPPLY: Performed by: SURGERY

## 2021-01-01 PROCEDURE — 6370000000 HC RX 637 (ALT 250 FOR IP): Performed by: STUDENT IN AN ORGANIZED HEALTH CARE EDUCATION/TRAINING PROGRAM

## 2021-01-01 PROCEDURE — 86900 BLOOD TYPING SEROLOGIC ABO: CPT

## 2021-01-01 PROCEDURE — 3700000001 HC ADD 15 MINUTES (ANESTHESIA): Performed by: SURGERY

## 2021-01-01 PROCEDURE — 82077 ASSAY SPEC XCP UR&BREATH IA: CPT

## 2021-01-01 PROCEDURE — 94660 CPAP INITIATION&MGMT: CPT

## 2021-01-01 PROCEDURE — 3600000002 HC SURGERY LEVEL 2 BASE: Performed by: SURGERY

## 2021-01-01 PROCEDURE — 83930 ASSAY OF BLOOD OSMOLALITY: CPT

## 2021-01-01 PROCEDURE — 81001 URINALYSIS AUTO W/SCOPE: CPT

## 2021-01-01 PROCEDURE — 2580000003 HC RX 258: Performed by: EMERGENCY MEDICINE

## 2021-01-01 PROCEDURE — 99223 1ST HOSP IP/OBS HIGH 75: CPT | Performed by: PHYSICIAN ASSISTANT

## 2021-01-01 PROCEDURE — 93005 ELECTROCARDIOGRAM TRACING: CPT | Performed by: INTERNAL MEDICINE

## 2021-01-01 PROCEDURE — 96375 TX/PRO/DX INJ NEW DRUG ADDON: CPT

## 2021-01-01 PROCEDURE — C1769 GUIDE WIRE: HCPCS | Performed by: SURGERY

## 2021-01-01 PROCEDURE — 86923 COMPATIBILITY TEST ELECTRIC: CPT

## 2021-01-01 PROCEDURE — 2500000003 HC RX 250 WO HCPCS: Performed by: EMERGENCY MEDICINE

## 2021-01-01 PROCEDURE — 80307 DRUG TEST PRSMV CHEM ANLYZR: CPT

## 2021-01-01 PROCEDURE — 70450 CT HEAD/BRAIN W/O DYE: CPT

## 2021-01-01 PROCEDURE — 83540 ASSAY OF IRON: CPT

## 2021-01-01 PROCEDURE — 36600 WITHDRAWAL OF ARTERIAL BLOOD: CPT

## 2021-01-01 PROCEDURE — 3209999900 FLUORO FOR SURGICAL PROCEDURES

## 2021-01-01 PROCEDURE — 84133 ASSAY OF URINE POTASSIUM: CPT

## 2021-01-01 PROCEDURE — 84100 ASSAY OF PHOSPHORUS: CPT

## 2021-01-01 PROCEDURE — 84145 PROCALCITONIN (PCT): CPT

## 2021-01-01 PROCEDURE — 6360000002 HC RX W HCPCS: Performed by: NURSE ANESTHETIST, CERTIFIED REGISTERED

## 2021-01-01 PROCEDURE — 2500000003 HC RX 250 WO HCPCS: Performed by: STUDENT IN AN ORGANIZED HEALTH CARE EDUCATION/TRAINING PROGRAM

## 2021-01-01 PROCEDURE — 96372 THER/PROPH/DIAG INJ SC/IM: CPT

## 2021-01-01 PROCEDURE — 97535 SELF CARE MNGMENT TRAINING: CPT

## 2021-01-01 PROCEDURE — 96365 THER/PROPH/DIAG IV INF INIT: CPT

## 2021-01-01 PROCEDURE — 2580000003 HC RX 258: Performed by: STUDENT IN AN ORGANIZED HEALTH CARE EDUCATION/TRAINING PROGRAM

## 2021-01-01 PROCEDURE — 83036 HEMOGLOBIN GLYCOSYLATED A1C: CPT

## 2021-01-01 PROCEDURE — 82436 ASSAY OF URINE CHLORIDE: CPT

## 2021-01-01 PROCEDURE — 51702 INSERT TEMP BLADDER CATH: CPT

## 2021-01-01 PROCEDURE — 71275 CT ANGIOGRAPHY CHEST: CPT

## 2021-01-01 PROCEDURE — 84132 ASSAY OF SERUM POTASSIUM: CPT

## 2021-01-01 PROCEDURE — 82570 ASSAY OF URINE CREATININE: CPT

## 2021-01-01 PROCEDURE — 83880 ASSAY OF NATRIURETIC PEPTIDE: CPT

## 2021-01-01 PROCEDURE — 71250 CT THORAX DX C-: CPT

## 2021-01-01 PROCEDURE — 2000000000 HC ICU R&B

## 2021-01-01 PROCEDURE — 74230 X-RAY XM SWLNG FUNCJ C+: CPT

## 2021-01-01 PROCEDURE — 97166 OT EVAL MOD COMPLEX 45 MIN: CPT

## 2021-01-01 PROCEDURE — 97530 THERAPEUTIC ACTIVITIES: CPT | Performed by: PHYSICAL THERAPIST

## 2021-01-01 PROCEDURE — 2580000003 HC RX 258: Performed by: NURSE ANESTHETIST, CERTIFIED REGISTERED

## 2021-01-01 PROCEDURE — 92610 EVALUATE SWALLOWING FUNCTION: CPT

## 2021-01-01 PROCEDURE — 85045 AUTOMATED RETICULOCYTE COUNT: CPT

## 2021-01-01 PROCEDURE — 2500000003 HC RX 250 WO HCPCS: Performed by: RADIOLOGY

## 2021-01-01 PROCEDURE — 6360000004 HC RX CONTRAST MEDICATION: Performed by: SURGERY

## 2021-01-01 PROCEDURE — 83550 IRON BINDING TEST: CPT

## 2021-01-01 RX ORDER — GABAPENTIN 400 MG/1
400 CAPSULE ORAL NIGHTLY
Status: DISCONTINUED | OUTPATIENT
Start: 2021-01-01 | End: 2021-01-01 | Stop reason: HOSPADM

## 2021-01-01 RX ORDER — CETIRIZINE HYDROCHLORIDE 10 MG/1
10 TABLET ORAL DAILY
Status: DISCONTINUED | OUTPATIENT
Start: 2021-01-01 | End: 2021-01-01 | Stop reason: HOSPADM

## 2021-01-01 RX ORDER — SODIUM CHLORIDE 9 MG/ML
INJECTION, SOLUTION INTRAVENOUS CONTINUOUS PRN
Status: DISCONTINUED | OUTPATIENT
Start: 2021-01-01 | End: 2021-01-01 | Stop reason: SDUPTHER

## 2021-01-01 RX ORDER — GABAPENTIN 300 MG/1
300 CAPSULE ORAL 2 TIMES DAILY
Status: DISCONTINUED | OUTPATIENT
Start: 2021-01-01 | End: 2021-01-01 | Stop reason: HOSPADM

## 2021-01-01 RX ORDER — BUDESONIDE AND FORMOTEROL FUMARATE DIHYDRATE 80; 4.5 UG/1; UG/1
2 AEROSOL RESPIRATORY (INHALATION) 2 TIMES DAILY
Status: DISCONTINUED | OUTPATIENT
Start: 2021-01-01 | End: 2021-01-01 | Stop reason: CLARIF

## 2021-01-01 RX ORDER — DEXTROSE MONOHYDRATE 25 G/50ML
12.5 INJECTION, SOLUTION INTRAVENOUS PRN
Status: DISCONTINUED | OUTPATIENT
Start: 2021-01-01 | End: 2021-01-01 | Stop reason: HOSPADM

## 2021-01-01 RX ORDER — MORPHINE SULFATE 100 MG/5ML
5 SOLUTION ORAL
Qty: 30 ML | Refills: 0 | Status: SHIPPED | OUTPATIENT
Start: 2021-01-01 | End: 2021-02-27

## 2021-01-01 RX ORDER — POTASSIUM CHLORIDE 7.45 MG/ML
10 INJECTION INTRAVENOUS ONCE
Status: COMPLETED | OUTPATIENT
Start: 2021-01-01 | End: 2021-01-01

## 2021-01-01 RX ORDER — CETIRIZINE HYDROCHLORIDE 10 MG/1
10 TABLET ORAL DAILY
Status: DISCONTINUED | OUTPATIENT
Start: 2021-01-01 | End: 2021-01-01

## 2021-01-01 RX ORDER — DEXTROSE AND SODIUM CHLORIDE 5; .45 G/100ML; G/100ML
INJECTION, SOLUTION INTRAVENOUS CONTINUOUS
Status: DISCONTINUED | OUTPATIENT
Start: 2021-01-01 | End: 2021-01-01

## 2021-01-01 RX ORDER — LEVETIRACETAM 5 MG/ML
500 INJECTION INTRAVASCULAR EVERY 12 HOURS
Status: DISCONTINUED | OUTPATIENT
Start: 2021-01-01 | End: 2021-01-01 | Stop reason: HOSPADM

## 2021-01-01 RX ORDER — SODIUM CHLORIDE 0.9 % (FLUSH) 0.9 %
10 SYRINGE (ML) INJECTION EVERY 12 HOURS SCHEDULED
Status: DISCONTINUED | OUTPATIENT
Start: 2021-01-01 | End: 2021-01-01 | Stop reason: HOSPADM

## 2021-01-01 RX ORDER — LATANOPROST 50 UG/ML
1 SOLUTION/ DROPS OPHTHALMIC EVERY EVENING
Status: DISCONTINUED | OUTPATIENT
Start: 2021-01-01 | End: 2021-01-01 | Stop reason: HOSPADM

## 2021-01-01 RX ORDER — ONDANSETRON 2 MG/ML
4 INJECTION INTRAMUSCULAR; INTRAVENOUS ONCE
Status: COMPLETED | OUTPATIENT
Start: 2021-01-01 | End: 2021-01-01

## 2021-01-01 RX ORDER — POTASSIUM CHLORIDE 20 MEQ/1
20 TABLET, EXTENDED RELEASE ORAL 2 TIMES DAILY
Status: DISCONTINUED | OUTPATIENT
Start: 2021-01-01 | End: 2021-01-01

## 2021-01-01 RX ORDER — KETOROLAC TROMETHAMINE 5 MG/ML
1 SOLUTION OPHTHALMIC DAILY
Status: DISCONTINUED | OUTPATIENT
Start: 2021-01-01 | End: 2021-01-01 | Stop reason: HOSPADM

## 2021-01-01 RX ORDER — LEVETIRACETAM 500 MG/1
500 TABLET ORAL 2 TIMES DAILY
Status: DISCONTINUED | OUTPATIENT
Start: 2021-01-01 | End: 2021-01-01

## 2021-01-01 RX ORDER — SODIUM CHLORIDE 9 MG/ML
INJECTION, SOLUTION INTRAVENOUS PRN
Status: DISCONTINUED | OUTPATIENT
Start: 2021-01-01 | End: 2021-01-01 | Stop reason: HOSPADM

## 2021-01-01 RX ORDER — ACETAMINOPHEN 500 MG
1000 TABLET ORAL ONCE
Status: COMPLETED | OUTPATIENT
Start: 2021-01-01 | End: 2021-01-01

## 2021-01-01 RX ORDER — OCTREOTIDE ACETATE 50 UG/ML
75 INJECTION, SOLUTION INTRAVENOUS; SUBCUTANEOUS EVERY 6 HOURS PRN
Status: DISCONTINUED | OUTPATIENT
Start: 2021-01-01 | End: 2021-01-01

## 2021-01-01 RX ORDER — 0.9 % SODIUM CHLORIDE 0.9 %
500 INTRAVENOUS SOLUTION INTRAVENOUS ONCE
Status: COMPLETED | OUTPATIENT
Start: 2021-01-01 | End: 2021-01-01

## 2021-01-01 RX ORDER — LIDOCAINE HYDROCHLORIDE 10 MG/ML
INJECTION, SOLUTION INFILTRATION; PERINEURAL PRN
Status: DISCONTINUED | OUTPATIENT
Start: 2021-01-01 | End: 2021-01-01 | Stop reason: ALTCHOICE

## 2021-01-01 RX ORDER — FENTANYL CITRATE 50 UG/ML
INJECTION, SOLUTION INTRAMUSCULAR; INTRAVENOUS PRN
Status: DISCONTINUED | OUTPATIENT
Start: 2021-01-01 | End: 2021-01-01 | Stop reason: SDUPTHER

## 2021-01-01 RX ORDER — KETOROLAC TROMETHAMINE 5 MG/ML
1 SOLUTION OPHTHALMIC DAILY
COMMUNITY

## 2021-01-01 RX ORDER — HYOSCYAMINE SULFATE 0.12 MG/1
1 TABLET SUBLINGUAL
Qty: 60 EACH | Refills: 0 | Status: SHIPPED | OUTPATIENT
Start: 2021-01-01

## 2021-01-01 RX ORDER — SODIUM CHLORIDE 0.9 % (FLUSH) 0.9 %
10 SYRINGE (ML) INJECTION PRN
Status: DISCONTINUED | OUTPATIENT
Start: 2021-01-01 | End: 2021-01-01 | Stop reason: HOSPADM

## 2021-01-01 RX ORDER — MORPHINE SULFATE 100 MG/5ML
5 SOLUTION ORAL
Status: DISCONTINUED | OUTPATIENT
Start: 2021-01-01 | End: 2021-01-01 | Stop reason: HOSPADM

## 2021-01-01 RX ORDER — DEXTROSE MONOHYDRATE 50 MG/ML
100 INJECTION, SOLUTION INTRAVENOUS PRN
Status: DISCONTINUED | OUTPATIENT
Start: 2021-01-01 | End: 2021-01-01 | Stop reason: HOSPADM

## 2021-01-01 RX ORDER — PANTOPRAZOLE SODIUM 40 MG/1
40 TABLET, DELAYED RELEASE ORAL DAILY
Status: DISCONTINUED | OUTPATIENT
Start: 2021-01-01 | End: 2021-01-01 | Stop reason: HOSPADM

## 2021-01-01 RX ORDER — ARFORMOTEROL TARTRATE 15 UG/2ML
15 SOLUTION RESPIRATORY (INHALATION) 2 TIMES DAILY
Status: DISCONTINUED | OUTPATIENT
Start: 2021-01-01 | End: 2021-01-01 | Stop reason: HOSPADM

## 2021-01-01 RX ORDER — MINERAL OIL/HYDROPHIL PETROLAT
OINTMENT (GRAM) TOPICAL 2 TIMES DAILY
Status: DISCONTINUED | OUTPATIENT
Start: 2021-01-01 | End: 2021-01-01 | Stop reason: HOSPADM

## 2021-01-01 RX ORDER — MORPHINE SULFATE 2 MG/ML
2 INJECTION, SOLUTION INTRAMUSCULAR; INTRAVENOUS ONCE
Status: COMPLETED | OUTPATIENT
Start: 2021-01-01 | End: 2021-01-01

## 2021-01-01 RX ORDER — PREDNISONE 20 MG/1
40 TABLET ORAL DAILY
Qty: 10 TABLET | Refills: 0 | Status: SHIPPED | OUTPATIENT
Start: 2021-01-01 | End: 2021-01-01

## 2021-01-01 RX ORDER — MIDAZOLAM HYDROCHLORIDE 1 MG/ML
INJECTION INTRAMUSCULAR; INTRAVENOUS PRN
Status: DISCONTINUED | OUTPATIENT
Start: 2021-01-01 | End: 2021-01-01 | Stop reason: SDUPTHER

## 2021-01-01 RX ORDER — DIMETHICONE, OXYBENZONE, AND PADIMATE O 2; 2.5; 6.6 G/100G; G/100G; G/100G
STICK TOPICAL PRN
Status: DISCONTINUED | OUTPATIENT
Start: 2021-01-01 | End: 2021-01-01 | Stop reason: HOSPADM

## 2021-01-01 RX ORDER — GLYCOPYRROLATE 0.2 MG/ML
0.2 INJECTION INTRAMUSCULAR; INTRAVENOUS EVERY 4 HOURS PRN
Status: DISCONTINUED | OUTPATIENT
Start: 2021-01-01 | End: 2021-01-01 | Stop reason: HOSPADM

## 2021-01-01 RX ORDER — PREDNISOLONE ACETATE 10 MG/ML
1 SUSPENSION/ DROPS OPHTHALMIC DAILY
Status: DISCONTINUED | OUTPATIENT
Start: 2021-01-01 | End: 2021-01-01 | Stop reason: HOSPADM

## 2021-01-01 RX ORDER — DEXTROSE MONOHYDRATE 100 MG/ML
INJECTION, SOLUTION INTRAVENOUS CONTINUOUS
Status: DISCONTINUED | OUTPATIENT
Start: 2021-01-01 | End: 2021-01-01

## 2021-01-01 RX ORDER — METRONIDAZOLE 500 MG/1
500 TABLET ORAL 3 TIMES DAILY
Qty: 15 TABLET | Refills: 0 | Status: SHIPPED | OUTPATIENT
Start: 2021-01-01 | End: 2021-01-01

## 2021-01-01 RX ORDER — SILICONE ADHESIVE 1.5" X 3"
1 SHEET (EA) TOPICAL NIGHTLY
Status: DISCONTINUED | OUTPATIENT
Start: 2021-01-01 | End: 2021-01-01 | Stop reason: CLARIF

## 2021-01-01 RX ORDER — POTASSIUM CHLORIDE 20 MEQ/1
40 TABLET, EXTENDED RELEASE ORAL ONCE
Status: DISCONTINUED | OUTPATIENT
Start: 2021-01-01 | End: 2021-01-01 | Stop reason: HOSPADM

## 2021-01-01 RX ORDER — OCTREOTIDE ACETATE 50 UG/ML
150 INJECTION, SOLUTION INTRAVENOUS; SUBCUTANEOUS ONCE
Status: COMPLETED | OUTPATIENT
Start: 2021-01-01 | End: 2021-01-01

## 2021-01-01 RX ORDER — DEXAMETHASONE SODIUM PHOSPHATE 4 MG/ML
4 INJECTION, SOLUTION INTRA-ARTICULAR; INTRALESIONAL; INTRAMUSCULAR; INTRAVENOUS; SOFT TISSUE EVERY 6 HOURS
Status: DISCONTINUED | OUTPATIENT
Start: 2021-01-01 | End: 2021-01-01

## 2021-01-01 RX ORDER — ALBUTEROL SULFATE 2.5 MG/3ML
2.5 SOLUTION RESPIRATORY (INHALATION) EVERY 6 HOURS PRN
Status: DISCONTINUED | OUTPATIENT
Start: 2021-01-01 | End: 2021-01-01 | Stop reason: HOSPADM

## 2021-01-01 RX ORDER — BUDESONIDE 0.25 MG/2ML
0.25 INHALANT ORAL 2 TIMES DAILY
Status: DISCONTINUED | OUTPATIENT
Start: 2021-01-01 | End: 2021-01-01 | Stop reason: HOSPADM

## 2021-01-01 RX ORDER — MAGNESIUM HYDROXIDE/ALUMINUM HYDROXICE/SIMETHICONE 120; 1200; 1200 MG/30ML; MG/30ML; MG/30ML
30 SUSPENSION ORAL PRN
Status: DISCONTINUED | OUTPATIENT
Start: 2021-01-01 | End: 2021-01-01 | Stop reason: HOSPADM

## 2021-01-01 RX ORDER — 0.9 % SODIUM CHLORIDE 0.9 %
1000 INTRAVENOUS SOLUTION INTRAVENOUS ONCE
Status: COMPLETED | OUTPATIENT
Start: 2021-01-01 | End: 2021-01-01

## 2021-01-01 RX ORDER — DEXTROSE MONOHYDRATE 50 MG/ML
INJECTION, SOLUTION INTRAVENOUS CONTINUOUS
Status: DISCONTINUED | OUTPATIENT
Start: 2021-01-01 | End: 2021-01-01

## 2021-01-01 RX ORDER — POTASSIUM CHLORIDE 7.45 MG/ML
10 INJECTION INTRAVENOUS
Status: COMPLETED | OUTPATIENT
Start: 2021-01-01 | End: 2021-01-01

## 2021-01-01 RX ORDER — LEVETIRACETAM 10 MG/ML
1000 INJECTION INTRAVASCULAR ONCE
Status: COMPLETED | OUTPATIENT
Start: 2021-01-01 | End: 2021-01-01

## 2021-01-01 RX ORDER — METOPROLOL SUCCINATE 25 MG/1
25 TABLET, EXTENDED RELEASE ORAL 2 TIMES DAILY
Status: DISCONTINUED | OUTPATIENT
Start: 2021-01-01 | End: 2021-01-01 | Stop reason: HOSPADM

## 2021-01-01 RX ORDER — POLYETHYLENE GLYCOL 3350 17 G/17G
17 POWDER, FOR SOLUTION ORAL DAILY PRN
Status: DISCONTINUED | OUTPATIENT
Start: 2021-01-01 | End: 2021-01-01 | Stop reason: HOSPADM

## 2021-01-01 RX ORDER — ALBUTEROL SULFATE 2.5 MG/3ML
2.5 SOLUTION RESPIRATORY (INHALATION) EVERY 6 HOURS PRN
Status: DISCONTINUED | OUTPATIENT
Start: 2021-01-01 | End: 2021-01-01 | Stop reason: SDUPTHER

## 2021-01-01 RX ORDER — FLUTICASONE FUROATE AND VILANTEROL 100; 25 UG/1; UG/1
1 POWDER RESPIRATORY (INHALATION) DAILY
Status: DISCONTINUED | OUTPATIENT
Start: 2021-01-01 | End: 2021-01-01 | Stop reason: CLARIF

## 2021-01-01 RX ORDER — IPRATROPIUM BROMIDE AND ALBUTEROL SULFATE 2.5; .5 MG/3ML; MG/3ML
1 SOLUTION RESPIRATORY (INHALATION)
Status: DISCONTINUED | OUTPATIENT
Start: 2021-01-01 | End: 2021-01-01 | Stop reason: HOSPADM

## 2021-01-01 RX ORDER — SODIUM CHLORIDE, SODIUM LACTATE, POTASSIUM CHLORIDE, CALCIUM CHLORIDE 600; 310; 30; 20 MG/100ML; MG/100ML; MG/100ML; MG/100ML
INJECTION, SOLUTION INTRAVENOUS CONTINUOUS
Status: DISCONTINUED | OUTPATIENT
Start: 2021-01-01 | End: 2021-01-01

## 2021-01-01 RX ORDER — PANTOPRAZOLE SODIUM 40 MG/1
40 TABLET, DELAYED RELEASE ORAL
Status: DISCONTINUED | OUTPATIENT
Start: 2021-01-01 | End: 2021-01-01 | Stop reason: HOSPADM

## 2021-01-01 RX ORDER — MORPHINE SULFATE 2 MG/ML
2 INJECTION, SOLUTION INTRAMUSCULAR; INTRAVENOUS EVERY 4 HOURS PRN
Status: DISCONTINUED | OUTPATIENT
Start: 2021-01-01 | End: 2021-01-01 | Stop reason: HOSPADM

## 2021-01-01 RX ORDER — FOLIC ACID 5 MG/ML
1 INJECTION, SOLUTION INTRAMUSCULAR; INTRAVENOUS; SUBCUTANEOUS DAILY
Status: DISCONTINUED | OUTPATIENT
Start: 2021-01-01 | End: 2021-01-01 | Stop reason: HOSPADM

## 2021-01-01 RX ORDER — HALOPERIDOL 2 MG/ML
1 SOLUTION ORAL EVERY 6 HOURS PRN
Status: DISCONTINUED | OUTPATIENT
Start: 2021-01-01 | End: 2021-01-01 | Stop reason: HOSPADM

## 2021-01-01 RX ORDER — ALBUTEROL SULFATE 2.5 MG/3ML
2.5 SOLUTION RESPIRATORY (INHALATION) EVERY 4 HOURS PRN
Status: DISCONTINUED | OUTPATIENT
Start: 2021-01-01 | End: 2021-01-01 | Stop reason: HOSPADM

## 2021-01-01 RX ORDER — NICOTINE POLACRILEX 4 MG
15 LOZENGE BUCCAL PRN
Status: DISCONTINUED | OUTPATIENT
Start: 2021-01-01 | End: 2021-01-01 | Stop reason: HOSPADM

## 2021-01-01 RX ORDER — BRIMONIDINE TARTRATE 2 MG/ML
1 SOLUTION/ DROPS OPHTHALMIC EVERY MORNING
Status: DISCONTINUED | OUTPATIENT
Start: 2021-01-01 | End: 2021-01-01 | Stop reason: HOSPADM

## 2021-01-01 RX ORDER — LORAZEPAM 0.5 MG/1
0.5 TABLET ORAL EVERY 8 HOURS PRN
Qty: 30 TABLET | Refills: 0 | Status: SHIPPED | OUTPATIENT
Start: 2021-01-01 | End: 2021-03-19

## 2021-01-01 RX ORDER — LEVOFLOXACIN 5 MG/ML
750 INJECTION, SOLUTION INTRAVENOUS ONCE
Status: COMPLETED | OUTPATIENT
Start: 2021-01-01 | End: 2021-01-01

## 2021-01-01 RX ORDER — PREDNISOLONE ACETATE 10 MG/ML
1 SUSPENSION/ DROPS OPHTHALMIC DAILY
COMMUNITY

## 2021-01-01 RX ORDER — HALOPERIDOL 2 MG/ML
1 SOLUTION ORAL EVERY 6 HOURS PRN
Qty: 60 ML | Refills: 0 | Status: SHIPPED | OUTPATIENT
Start: 2021-01-01

## 2021-01-01 RX ORDER — CALCIUM CARBONATE 500(1250)
500 TABLET ORAL DAILY
Status: DISCONTINUED | OUTPATIENT
Start: 2021-01-01 | End: 2021-01-01 | Stop reason: HOSPADM

## 2021-01-01 RX ADMIN — BUDESONIDE 250 MCG: 0.25 SUSPENSION RESPIRATORY (INHALATION) at 09:36

## 2021-01-01 RX ADMIN — LATANOPROST 1 DROP: 50 SOLUTION OPHTHALMIC at 17:51

## 2021-01-01 RX ADMIN — HYDROCORTISONE SODIUM SUCCINATE 50 MG: 100 INJECTION, POWDER, FOR SOLUTION INTRAMUSCULAR; INTRAVENOUS at 03:34

## 2021-01-01 RX ADMIN — IPRATROPIUM BROMIDE AND ALBUTEROL SULFATE 1 AMPULE: .5; 3 SOLUTION RESPIRATORY (INHALATION) at 16:55

## 2021-01-01 RX ADMIN — POTASSIUM CHLORIDE: 2 INJECTION, SOLUTION, CONCENTRATE INTRAVENOUS at 01:47

## 2021-01-01 RX ADMIN — Medication: at 12:09

## 2021-01-01 RX ADMIN — ARFORMOTEROL TARTRATE 15 MCG: 15 SOLUTION RESPIRATORY (INHALATION) at 21:14

## 2021-01-01 RX ADMIN — BUDESONIDE 250 MCG: 0.25 SUSPENSION RESPIRATORY (INHALATION) at 20:57

## 2021-01-01 RX ADMIN — ARFORMOTEROL TARTRATE 15 MCG: 15 SOLUTION RESPIRATORY (INHALATION) at 09:56

## 2021-01-01 RX ADMIN — IPRATROPIUM BROMIDE AND ALBUTEROL SULFATE 1 AMPULE: .5; 3 SOLUTION RESPIRATORY (INHALATION) at 11:49

## 2021-01-01 RX ADMIN — METRONIDAZOLE 500 MG: 500 INJECTION, SOLUTION INTRAVENOUS at 18:52

## 2021-01-01 RX ADMIN — BARIUM SULFATE 15 ML: 400 SUSPENSION ORAL at 15:50

## 2021-01-01 RX ADMIN — BRIMONIDINE TARTRATE 1 DROP: 2 SOLUTION OPHTHALMIC at 09:04

## 2021-01-01 RX ADMIN — CETIRIZINE HYDROCHLORIDE 10 MG: 10 TABLET, FILM COATED ORAL at 09:36

## 2021-01-01 RX ADMIN — PANTOPRAZOLE SODIUM 40 MG: 40 TABLET, DELAYED RELEASE ORAL at 06:22

## 2021-01-01 RX ADMIN — IPRATROPIUM BROMIDE AND ALBUTEROL SULFATE 1 AMPULE: .5; 3 SOLUTION RESPIRATORY (INHALATION) at 16:27

## 2021-01-01 RX ADMIN — Medication 15 G: at 08:53

## 2021-01-01 RX ADMIN — METRONIDAZOLE 500 MG: 500 INJECTION, SOLUTION INTRAVENOUS at 09:59

## 2021-01-01 RX ADMIN — Medication 10 ML: at 16:55

## 2021-01-01 RX ADMIN — BUDESONIDE 250 MCG: 0.25 SUSPENSION RESPIRATORY (INHALATION) at 10:56

## 2021-01-01 RX ADMIN — INSULIN LISPRO 1 UNITS: 100 INJECTION, SOLUTION INTRAVENOUS; SUBCUTANEOUS at 18:51

## 2021-01-01 RX ADMIN — INSULIN LISPRO 2 UNITS: 100 INJECTION, SOLUTION INTRAVENOUS; SUBCUTANEOUS at 08:42

## 2021-01-01 RX ADMIN — GLUCAGON HYDROCHLORIDE 1 MG: 1 INJECTION, POWDER, FOR SOLUTION INTRAMUSCULAR; INTRAVENOUS; SUBCUTANEOUS at 12:48

## 2021-01-01 RX ADMIN — CEFTRIAXONE SODIUM 1000 MG: 1 INJECTION, POWDER, FOR SOLUTION INTRAMUSCULAR; INTRAVENOUS at 18:48

## 2021-01-01 RX ADMIN — METRONIDAZOLE 500 MG: 500 INJECTION, SOLUTION INTRAVENOUS at 10:30

## 2021-01-01 RX ADMIN — IPRATROPIUM BROMIDE AND ALBUTEROL SULFATE 1 AMPULE: .5; 3 SOLUTION RESPIRATORY (INHALATION) at 10:04

## 2021-01-01 RX ADMIN — BUDESONIDE 250 MCG: 0.25 SUSPENSION RESPIRATORY (INHALATION) at 10:04

## 2021-01-01 RX ADMIN — INSULIN LISPRO 1 UNITS: 100 INJECTION, SOLUTION INTRAVENOUS; SUBCUTANEOUS at 21:40

## 2021-01-01 RX ADMIN — BRIMONIDINE TARTRATE 1 DROP: 2 SOLUTION OPHTHALMIC at 12:41

## 2021-01-01 RX ADMIN — FENTANYL CITRATE 50 MCG: 50 INJECTION, SOLUTION INTRAMUSCULAR; INTRAVENOUS at 08:13

## 2021-01-01 RX ADMIN — INSULIN LISPRO 4 UNITS: 100 INJECTION, SOLUTION INTRAVENOUS; SUBCUTANEOUS at 21:37

## 2021-01-01 RX ADMIN — MORPHINE SULFATE 2 MG: 2 INJECTION, SOLUTION INTRAMUSCULAR; INTRAVENOUS at 21:44

## 2021-01-01 RX ADMIN — METRONIDAZOLE 500 MG: 500 INJECTION, SOLUTION INTRAVENOUS at 02:00

## 2021-01-01 RX ADMIN — INSULIN LISPRO 1 UNITS: 100 INJECTION, SOLUTION INTRAVENOUS; SUBCUTANEOUS at 05:15

## 2021-01-01 RX ADMIN — CETIRIZINE HYDROCHLORIDE 10 MG: 10 TABLET, FILM COATED ORAL at 09:03

## 2021-01-01 RX ADMIN — CETIRIZINE HYDROCHLORIDE 10 MG: 10 TABLET, FILM COATED ORAL at 09:58

## 2021-01-01 RX ADMIN — DEXAMETHASONE SODIUM PHOSPHATE 4 MG: 4 INJECTION, SOLUTION INTRAMUSCULAR; INTRAVENOUS at 10:03

## 2021-01-01 RX ADMIN — IPRATROPIUM BROMIDE AND ALBUTEROL SULFATE 1 AMPULE: .5; 3 SOLUTION RESPIRATORY (INHALATION) at 20:31

## 2021-01-01 RX ADMIN — INSULIN LISPRO 3 UNITS: 100 INJECTION, SOLUTION INTRAVENOUS; SUBCUTANEOUS at 02:21

## 2021-01-01 RX ADMIN — MORPHINE SULFATE 2 MG: 2 INJECTION, SOLUTION INTRAMUSCULAR; INTRAVENOUS at 05:17

## 2021-01-01 RX ADMIN — LEVETIRACETAM 500 MG: 5 INJECTION INTRAVENOUS at 05:35

## 2021-01-01 RX ADMIN — PANTOPRAZOLE SODIUM 40 MG: 40 TABLET, DELAYED RELEASE ORAL at 06:06

## 2021-01-01 RX ADMIN — CEFTRIAXONE SODIUM 1000 MG: 1 INJECTION, POWDER, FOR SOLUTION INTRAMUSCULAR; INTRAVENOUS at 20:53

## 2021-01-01 RX ADMIN — BUDESONIDE 250 MCG: 0.25 SUSPENSION RESPIRATORY (INHALATION) at 20:52

## 2021-01-01 RX ADMIN — DEXTROSE MONOHYDRATE 12.5 G: 25 INJECTION, SOLUTION INTRAVENOUS at 22:24

## 2021-01-01 RX ADMIN — ARFORMOTEROL TARTRATE 15 MCG: 15 SOLUTION RESPIRATORY (INHALATION) at 20:56

## 2021-01-01 RX ADMIN — CEFTRIAXONE SODIUM 1000 MG: 1 INJECTION, POWDER, FOR SOLUTION INTRAMUSCULAR; INTRAVENOUS at 20:48

## 2021-01-01 RX ADMIN — INSULIN LISPRO 1 UNITS: 100 INJECTION, SOLUTION INTRAVENOUS; SUBCUTANEOUS at 16:16

## 2021-01-01 RX ADMIN — METRONIDAZOLE 500 MG: 500 INJECTION, SOLUTION INTRAVENOUS at 19:04

## 2021-01-01 RX ADMIN — CEFTRIAXONE SODIUM 1000 MG: 1 INJECTION, POWDER, FOR SOLUTION INTRAMUSCULAR; INTRAVENOUS at 18:31

## 2021-01-01 RX ADMIN — CETIRIZINE HYDROCHLORIDE 10 MG: 10 TABLET, FILM COATED ORAL at 12:08

## 2021-01-01 RX ADMIN — BARIUM SULFATE 15 ML: 400 PASTE ORAL at 15:50

## 2021-01-01 RX ADMIN — BUDESONIDE 250 MCG: 0.25 SUSPENSION RESPIRATORY (INHALATION) at 08:30

## 2021-01-01 RX ADMIN — BUDESONIDE 250 MCG: 0.25 SUSPENSION RESPIRATORY (INHALATION) at 08:20

## 2021-01-01 RX ADMIN — METRONIDAZOLE 500 MG: 500 INJECTION, SOLUTION INTRAVENOUS at 02:38

## 2021-01-01 RX ADMIN — IPRATROPIUM BROMIDE AND ALBUTEROL SULFATE 1 AMPULE: .5; 3 SOLUTION RESPIRATORY (INHALATION) at 08:20

## 2021-01-01 RX ADMIN — Medication: at 09:19

## 2021-01-01 RX ADMIN — MORPHINE SULFATE 2 MG: 2 INJECTION, SOLUTION INTRAMUSCULAR; INTRAVENOUS at 11:29

## 2021-01-01 RX ADMIN — ARFORMOTEROL TARTRATE 15 MCG: 15 SOLUTION RESPIRATORY (INHALATION) at 20:52

## 2021-01-01 RX ADMIN — ARFORMOTEROL TARTRATE 15 MCG: 15 SOLUTION RESPIRATORY (INHALATION) at 08:30

## 2021-01-01 RX ADMIN — SODIUM CHLORIDE: 9 INJECTION, SOLUTION INTRAVENOUS at 08:04

## 2021-01-01 RX ADMIN — BUDESONIDE 250 MCG: 0.25 SUSPENSION RESPIRATORY (INHALATION) at 21:15

## 2021-01-01 RX ADMIN — IPRATROPIUM BROMIDE AND ALBUTEROL SULFATE 1 AMPULE: .5; 3 SOLUTION RESPIRATORY (INHALATION) at 12:16

## 2021-01-01 RX ADMIN — CEFTRIAXONE SODIUM 1000 MG: 1 INJECTION, POWDER, FOR SOLUTION INTRAMUSCULAR; INTRAVENOUS at 01:26

## 2021-01-01 RX ADMIN — INSULIN LISPRO 1 UNITS: 100 INJECTION, SOLUTION INTRAVENOUS; SUBCUTANEOUS at 17:58

## 2021-01-01 RX ADMIN — Medication: at 20:52

## 2021-01-01 RX ADMIN — DEXTROSE MONOHYDRATE: 100 INJECTION, SOLUTION INTRAVENOUS at 04:33

## 2021-01-01 RX ADMIN — PREDNISOLONE ACETATE 1 DROP: 10 SUSPENSION/ DROPS OPHTHALMIC at 22:51

## 2021-01-01 RX ADMIN — DEXTROSE MONOHYDRATE: 100 INJECTION, SOLUTION INTRAVENOUS at 16:02

## 2021-01-01 RX ADMIN — CEFTRIAXONE SODIUM 1000 MG: 1 INJECTION, POWDER, FOR SOLUTION INTRAMUSCULAR; INTRAVENOUS at 17:55

## 2021-01-01 RX ADMIN — CETIRIZINE HYDROCHLORIDE 10 MG: 10 TABLET, FILM COATED ORAL at 09:19

## 2021-01-01 RX ADMIN — POTASSIUM CHLORIDE 10 MEQ: 10 INJECTION, SOLUTION INTRAVENOUS at 06:24

## 2021-01-01 RX ADMIN — DEXTROSE MONOHYDRATE 12.5 G: 25 INJECTION, SOLUTION INTRAVENOUS at 22:44

## 2021-01-01 RX ADMIN — ARFORMOTEROL TARTRATE 15 MCG: 15 SOLUTION RESPIRATORY (INHALATION) at 09:35

## 2021-01-01 RX ADMIN — Medication: at 12:44

## 2021-01-01 RX ADMIN — INSULIN LISPRO 3 UNITS: 100 INJECTION, SOLUTION INTRAVENOUS; SUBCUTANEOUS at 06:14

## 2021-01-01 RX ADMIN — PREDNISOLONE ACETATE 1 DROP: 10 SUSPENSION/ DROPS OPHTHALMIC at 09:59

## 2021-01-01 RX ADMIN — FOLIC ACID 1 MG: 5 INJECTION, SOLUTION INTRAMUSCULAR; INTRAVENOUS; SUBCUTANEOUS at 09:10

## 2021-01-01 RX ADMIN — METRONIDAZOLE 500 MG: 500 INJECTION, SOLUTION INTRAVENOUS at 09:11

## 2021-01-01 RX ADMIN — SODIUM CHLORIDE, PRESERVATIVE FREE 10 ML: 5 INJECTION INTRAVENOUS at 11:29

## 2021-01-01 RX ADMIN — LATANOPROST 1 DROP: 50 SOLUTION OPHTHALMIC at 21:07

## 2021-01-01 RX ADMIN — BUDESONIDE 250 MCG: 0.25 SUSPENSION RESPIRATORY (INHALATION) at 11:45

## 2021-01-01 RX ADMIN — SODIUM CHLORIDE 1000 ML: 9 INJECTION, SOLUTION INTRAVENOUS at 21:30

## 2021-01-01 RX ADMIN — METRONIDAZOLE 500 MG: 500 INJECTION, SOLUTION INTRAVENOUS at 18:07

## 2021-01-01 RX ADMIN — FOLIC ACID 1 MG: 5 INJECTION, SOLUTION INTRAMUSCULAR; INTRAVENOUS; SUBCUTANEOUS at 09:04

## 2021-01-01 RX ADMIN — INSULIN LISPRO 2 UNITS: 100 INJECTION, SOLUTION INTRAVENOUS; SUBCUTANEOUS at 11:55

## 2021-01-01 RX ADMIN — ARFORMOTEROL TARTRATE 15 MCG: 15 SOLUTION RESPIRATORY (INHALATION) at 08:20

## 2021-01-01 RX ADMIN — HYDROCORTISONE SODIUM SUCCINATE 25 MG: 100 INJECTION, POWDER, FOR SOLUTION INTRAMUSCULAR; INTRAVENOUS at 12:08

## 2021-01-01 RX ADMIN — LEVETIRACETAM 500 MG: 5 INJECTION INTRAVENOUS at 18:07

## 2021-01-01 RX ADMIN — POTASSIUM CHLORIDE 10 MEQ: 10 INJECTION, SOLUTION INTRAVENOUS at 17:36

## 2021-01-01 RX ADMIN — KETOROLAC TROMETHAMINE 1 DROP: 5 SOLUTION/ DROPS OPHTHALMIC at 22:51

## 2021-01-01 RX ADMIN — PANTOPRAZOLE SODIUM 40 MG: 40 TABLET, DELAYED RELEASE ORAL at 05:15

## 2021-01-01 RX ADMIN — DEXTROSE AND SODIUM CHLORIDE: 5; 450 INJECTION, SOLUTION INTRAVENOUS at 19:38

## 2021-01-01 RX ADMIN — CEFTRIAXONE SODIUM 1000 MG: 1 INJECTION, POWDER, FOR SOLUTION INTRAMUSCULAR; INTRAVENOUS at 20:22

## 2021-01-01 RX ADMIN — MIDAZOLAM 0.5 MG: 1 INJECTION INTRAMUSCULAR; INTRAVENOUS at 08:13

## 2021-01-01 RX ADMIN — GABAPENTIN 300 MG: 300 CAPSULE ORAL at 10:13

## 2021-01-01 RX ADMIN — IOPAMIDOL 90 ML: 755 INJECTION, SOLUTION INTRAVENOUS at 16:55

## 2021-01-01 RX ADMIN — ARFORMOTEROL TARTRATE 15 MCG: 15 SOLUTION RESPIRATORY (INHALATION) at 20:31

## 2021-01-01 RX ADMIN — SODIUM CHLORIDE, PRESERVATIVE FREE 10 ML: 5 INJECTION INTRAVENOUS at 01:36

## 2021-01-01 RX ADMIN — LATANOPROST 1 DROP: 50 SOLUTION OPHTHALMIC at 17:35

## 2021-01-01 RX ADMIN — POTASSIUM CHLORIDE 10 MEQ: 10 INJECTION, SOLUTION INTRAVENOUS at 08:41

## 2021-01-01 RX ADMIN — DEXTROSE MONOHYDRATE: 100 INJECTION, SOLUTION INTRAVENOUS at 01:26

## 2021-01-01 RX ADMIN — METRONIDAZOLE 500 MG: 500 INJECTION, SOLUTION INTRAVENOUS at 18:40

## 2021-01-01 RX ADMIN — LEVETIRACETAM 1000 MG: 10 INJECTION INTRAVENOUS at 18:48

## 2021-01-01 RX ADMIN — METRONIDAZOLE 500 MG: 500 INJECTION, SOLUTION INTRAVENOUS at 19:17

## 2021-01-01 RX ADMIN — CETIRIZINE HYDROCHLORIDE 10 MG: 10 TABLET, FILM COATED ORAL at 09:13

## 2021-01-01 RX ADMIN — INSULIN LISPRO 1 UNITS: 100 INJECTION, SOLUTION INTRAVENOUS; SUBCUTANEOUS at 17:50

## 2021-01-01 RX ADMIN — OCTREOTIDE ACETATE 150 MCG: 50 INJECTION, SOLUTION INTRAVENOUS; SUBCUTANEOUS at 11:29

## 2021-01-01 RX ADMIN — DEXTROSE MONOHYDRATE: 50 INJECTION, SOLUTION INTRAVENOUS at 21:27

## 2021-01-01 RX ADMIN — METRONIDAZOLE 500 MG: 500 INJECTION, SOLUTION INTRAVENOUS at 11:30

## 2021-01-01 RX ADMIN — MORPHINE SULFATE 5 MG: 100 SOLUTION ORAL at 18:34

## 2021-01-01 RX ADMIN — METRONIDAZOLE 500 MG: 500 INJECTION, SOLUTION INTRAVENOUS at 03:28

## 2021-01-01 RX ADMIN — METRONIDAZOLE 500 MG: 500 INJECTION, SOLUTION INTRAVENOUS at 19:15

## 2021-01-01 RX ADMIN — METRONIDAZOLE 500 MG: 500 INJECTION, SOLUTION INTRAVENOUS at 12:12

## 2021-01-01 RX ADMIN — Medication: at 20:23

## 2021-01-01 RX ADMIN — CEFTRIAXONE SODIUM 1000 MG: 1 INJECTION, POWDER, FOR SOLUTION INTRAMUSCULAR; INTRAVENOUS at 21:40

## 2021-01-01 RX ADMIN — METOPROLOL SUCCINATE 25 MG: 25 TABLET, EXTENDED RELEASE ORAL at 22:06

## 2021-01-01 RX ADMIN — DEXTROSE MONOHYDRATE 12.5 G: 25 INJECTION, SOLUTION INTRAVENOUS at 16:02

## 2021-01-01 RX ADMIN — MORPHINE SULFATE 5 MG: 100 SOLUTION ORAL at 14:52

## 2021-01-01 RX ADMIN — IPRATROPIUM BROMIDE AND ALBUTEROL SULFATE 1 AMPULE: .5; 3 SOLUTION RESPIRATORY (INHALATION) at 09:18

## 2021-01-01 RX ADMIN — POTASSIUM CHLORIDE: 2 INJECTION, SOLUTION, CONCENTRATE INTRAVENOUS at 16:45

## 2021-01-01 RX ADMIN — BUDESONIDE 250 MCG: 0.25 SUSPENSION RESPIRATORY (INHALATION) at 08:57

## 2021-01-01 RX ADMIN — LEVETIRACETAM 500 MG: 5 INJECTION INTRAVENOUS at 17:54

## 2021-01-01 RX ADMIN — METRONIDAZOLE 500 MG: 500 INJECTION, SOLUTION INTRAVENOUS at 04:47

## 2021-01-01 RX ADMIN — ARFORMOTEROL TARTRATE 15 MCG: 15 SOLUTION RESPIRATORY (INHALATION) at 20:57

## 2021-01-01 RX ADMIN — IPRATROPIUM BROMIDE AND ALBUTEROL SULFATE 1 AMPULE: .5; 3 SOLUTION RESPIRATORY (INHALATION) at 14:03

## 2021-01-01 RX ADMIN — HYDROCORTISONE SODIUM SUCCINATE 25 MG: 100 INJECTION, POWDER, FOR SOLUTION INTRAMUSCULAR; INTRAVENOUS at 11:03

## 2021-01-01 RX ADMIN — ONDANSETRON 4 MG: 2 INJECTION INTRAMUSCULAR; INTRAVENOUS at 17:16

## 2021-01-01 RX ADMIN — GABAPENTIN 300 MG: 300 CAPSULE ORAL at 17:55

## 2021-01-01 RX ADMIN — FENTANYL CITRATE 50 MCG: 50 INJECTION, SOLUTION INTRAMUSCULAR; INTRAVENOUS at 08:11

## 2021-01-01 RX ADMIN — POTASSIUM CHLORIDE: 2 INJECTION, SOLUTION, CONCENTRATE INTRAVENOUS at 02:01

## 2021-01-01 RX ADMIN — METOPROLOL SUCCINATE 25 MG: 25 TABLET, EXTENDED RELEASE ORAL at 09:58

## 2021-01-01 RX ADMIN — INSULIN LISPRO 2 UNITS: 100 INJECTION, SOLUTION INTRAVENOUS; SUBCUTANEOUS at 14:01

## 2021-01-01 RX ADMIN — IPRATROPIUM BROMIDE AND ALBUTEROL SULFATE 1 AMPULE: .5; 3 SOLUTION RESPIRATORY (INHALATION) at 20:56

## 2021-01-01 RX ADMIN — SODIUM CHLORIDE, PRESERVATIVE FREE 10 ML: 5 INJECTION INTRAVENOUS at 09:59

## 2021-01-01 RX ADMIN — GABAPENTIN 400 MG: 400 CAPSULE ORAL at 22:06

## 2021-01-01 RX ADMIN — LEVETIRACETAM 500 MG: 5 INJECTION INTRAVENOUS at 18:31

## 2021-01-01 RX ADMIN — ARFORMOTEROL TARTRATE 15 MCG: 15 SOLUTION RESPIRATORY (INHALATION) at 10:55

## 2021-01-01 RX ADMIN — BUDESONIDE 250 MCG: 0.25 SUSPENSION RESPIRATORY (INHALATION) at 09:57

## 2021-01-01 RX ADMIN — POTASSIUM CHLORIDE: 2 INJECTION, SOLUTION, CONCENTRATE INTRAVENOUS at 06:13

## 2021-01-01 RX ADMIN — IPRATROPIUM BROMIDE AND ALBUTEROL SULFATE 1 AMPULE: .5; 3 SOLUTION RESPIRATORY (INHALATION) at 15:54

## 2021-01-01 RX ADMIN — HYDROCORTISONE SODIUM SUCCINATE 50 MG: 100 INJECTION, POWDER, FOR SOLUTION INTRAMUSCULAR; INTRAVENOUS at 20:23

## 2021-01-01 RX ADMIN — DEXTROSE MONOHYDRATE 12.5 G: 25 INJECTION, SOLUTION INTRAVENOUS at 10:39

## 2021-01-01 RX ADMIN — ARFORMOTEROL TARTRATE 15 MCG: 15 SOLUTION RESPIRATORY (INHALATION) at 20:48

## 2021-01-01 RX ADMIN — ARFORMOTEROL TARTRATE 15 MCG: 15 SOLUTION RESPIRATORY (INHALATION) at 09:18

## 2021-01-01 RX ADMIN — MORPHINE SULFATE 2 MG: 2 INJECTION, SOLUTION INTRAMUSCULAR; INTRAVENOUS at 11:02

## 2021-01-01 RX ADMIN — ACETAMINOPHEN 500 MG: 500 TABLET ORAL at 15:48

## 2021-01-01 RX ADMIN — BRIMONIDINE TARTRATE 1 DROP: 2 SOLUTION OPHTHALMIC at 12:09

## 2021-01-01 RX ADMIN — SODIUM CHLORIDE 1000 ML: 9 INJECTION, SOLUTION INTRAVENOUS at 17:18

## 2021-01-01 RX ADMIN — METRONIDAZOLE 500 MG: 500 INJECTION, SOLUTION INTRAVENOUS at 18:31

## 2021-01-01 RX ADMIN — ARFORMOTEROL TARTRATE 15 MCG: 15 SOLUTION RESPIRATORY (INHALATION) at 08:56

## 2021-01-01 RX ADMIN — LEVETIRACETAM 500 MG: 5 INJECTION INTRAVENOUS at 06:22

## 2021-01-01 RX ADMIN — INSULIN LISPRO 2 UNITS: 100 INJECTION, SOLUTION INTRAVENOUS; SUBCUTANEOUS at 09:30

## 2021-01-01 RX ADMIN — METRONIDAZOLE 500 MG: 500 INJECTION, SOLUTION INTRAVENOUS at 01:46

## 2021-01-01 RX ADMIN — FOLIC ACID 1 MG: 5 INJECTION, SOLUTION INTRAMUSCULAR; INTRAVENOUS; SUBCUTANEOUS at 16:12

## 2021-01-01 RX ADMIN — LEVOFLOXACIN 750 MG: 5 INJECTION, SOLUTION INTRAVENOUS at 17:33

## 2021-01-01 RX ADMIN — ARFORMOTEROL TARTRATE 15 MCG: 15 SOLUTION RESPIRATORY (INHALATION) at 11:44

## 2021-01-01 RX ADMIN — HYDROCORTISONE SODIUM SUCCINATE 25 MG: 100 INJECTION, POWDER, FOR SOLUTION INTRAMUSCULAR; INTRAVENOUS at 19:15

## 2021-01-01 RX ADMIN — DEXTROSE MONOHYDRATE 12.5 G: 25 INJECTION, SOLUTION INTRAVENOUS at 03:34

## 2021-01-01 RX ADMIN — BUDESONIDE 250 MCG: 0.25 SUSPENSION RESPIRATORY (INHALATION) at 20:56

## 2021-01-01 RX ADMIN — SODIUM CHLORIDE 1000 ML: 9 INJECTION, SOLUTION INTRAVENOUS at 15:47

## 2021-01-01 RX ADMIN — METRONIDAZOLE 500 MG: 500 INJECTION, SOLUTION INTRAVENOUS at 03:33

## 2021-01-01 RX ADMIN — LATANOPROST 1 DROP: 50 SOLUTION OPHTHALMIC at 00:55

## 2021-01-01 RX ADMIN — BUDESONIDE 250 MCG: 0.25 SUSPENSION RESPIRATORY (INHALATION) at 09:18

## 2021-01-01 RX ADMIN — DEXTROSE MONOHYDRATE 12.5 G: 25 INJECTION, SOLUTION INTRAVENOUS at 00:17

## 2021-01-01 RX ADMIN — DEXTROSE MONOHYDRATE 12.5 G: 25 INJECTION, SOLUTION INTRAVENOUS at 20:40

## 2021-01-01 RX ADMIN — MIDAZOLAM 0.5 MG: 1 INJECTION INTRAMUSCULAR; INTRAVENOUS at 08:20

## 2021-01-01 RX ADMIN — HYDROCORTISONE SODIUM SUCCINATE 25 MG: 100 INJECTION, POWDER, FOR SOLUTION INTRAMUSCULAR; INTRAVENOUS at 04:59

## 2021-01-01 RX ADMIN — BUDESONIDE 250 MCG: 0.25 SUSPENSION RESPIRATORY (INHALATION) at 20:48

## 2021-01-01 RX ADMIN — KETOROLAC TROMETHAMINE 1 DROP: 5 SOLUTION/ DROPS OPHTHALMIC at 09:59

## 2021-01-01 RX ADMIN — PREDNISOLONE ACETATE 1 DROP: 10 SUSPENSION/ DROPS OPHTHALMIC at 12:08

## 2021-01-01 RX ADMIN — BUDESONIDE 250 MCG: 0.25 SUSPENSION RESPIRATORY (INHALATION) at 20:31

## 2021-01-01 RX ADMIN — CEFTRIAXONE SODIUM 1000 MG: 1 INJECTION, POWDER, FOR SOLUTION INTRAMUSCULAR; INTRAVENOUS at 18:07

## 2021-01-01 RX ADMIN — DEXTROSE AND SODIUM CHLORIDE: 5; 450 INJECTION, SOLUTION INTRAVENOUS at 20:27

## 2021-01-01 RX ADMIN — METRONIDAZOLE 500 MG: 500 INJECTION, SOLUTION INTRAVENOUS at 10:10

## 2021-01-01 RX ADMIN — METRONIDAZOLE 500 MG: 500 INJECTION, SOLUTION INTRAVENOUS at 20:19

## 2021-01-01 RX ADMIN — ARFORMOTEROL TARTRATE 15 MCG: 15 SOLUTION RESPIRATORY (INHALATION) at 10:04

## 2021-01-01 RX ADMIN — METRONIDAZOLE 500 MG: 500 INJECTION, SOLUTION INTRAVENOUS at 10:37

## 2021-01-01 RX ADMIN — PANTOPRAZOLE SODIUM 40 MG: 40 TABLET, DELAYED RELEASE ORAL at 05:04

## 2021-01-01 RX ADMIN — DEXTROSE MONOHYDRATE 12.5 G: 25 INJECTION, SOLUTION INTRAVENOUS at 01:33

## 2021-01-01 RX ADMIN — IPRATROPIUM BROMIDE AND ALBUTEROL SULFATE 1 AMPULE: .5; 3 SOLUTION RESPIRATORY (INHALATION) at 16:13

## 2021-01-01 RX ADMIN — METRONIDAZOLE 500 MG: 500 INJECTION, SOLUTION INTRAVENOUS at 10:33

## 2021-01-01 RX ADMIN — BRIMONIDINE TARTRATE 1 DROP: 2 SOLUTION OPHTHALMIC at 09:19

## 2021-01-01 RX ADMIN — METRONIDAZOLE 500 MG: 500 INJECTION, SOLUTION INTRAVENOUS at 04:59

## 2021-01-01 RX ADMIN — METRONIDAZOLE 500 MG: 500 INJECTION, SOLUTION INTRAVENOUS at 12:07

## 2021-01-01 RX ADMIN — Medication: at 09:10

## 2021-01-01 RX ADMIN — KETOROLAC TROMETHAMINE 1 DROP: 5 SOLUTION/ DROPS OPHTHALMIC at 09:11

## 2021-01-01 RX ADMIN — PANTOPRAZOLE SODIUM 40 MG: 40 TABLET, DELAYED RELEASE ORAL at 09:03

## 2021-01-01 RX ADMIN — PREDNISOLONE ACETATE 1 DROP: 10 SUSPENSION/ DROPS OPHTHALMIC at 09:11

## 2021-01-01 RX ADMIN — HYDROCORTISONE SODIUM SUCCINATE 25 MG: 100 INJECTION, POWDER, FOR SOLUTION INTRAMUSCULAR; INTRAVENOUS at 05:20

## 2021-01-01 RX ADMIN — LATANOPROST 1 DROP: 50 SOLUTION OPHTHALMIC at 18:40

## 2021-01-01 RX ADMIN — HYDROCORTISONE SODIUM SUCCINATE 25 MG: 100 INJECTION, POWDER, FOR SOLUTION INTRAMUSCULAR; INTRAVENOUS at 21:40

## 2021-01-01 RX ADMIN — METRONIDAZOLE 500 MG: 500 INJECTION, SOLUTION INTRAVENOUS at 01:38

## 2021-01-01 RX ADMIN — MORPHINE SULFATE 2 MG: 2 INJECTION, SOLUTION INTRAMUSCULAR; INTRAVENOUS at 15:13

## 2021-01-01 RX ADMIN — SODIUM CHLORIDE 500 ML: 9 INJECTION, SOLUTION INTRAVENOUS at 12:44

## 2021-01-01 RX ADMIN — MAGNESIUM HYDROXIDE/ALUMINUM HYDROXICE/SIMETHICONE 30 ML: 120; 1200; 1200 SUSPENSION ORAL at 11:03

## 2021-01-01 RX ADMIN — METRONIDAZOLE 500 MG: 500 INJECTION, SOLUTION INTRAVENOUS at 17:55

## 2021-01-01 RX ADMIN — Medication: at 21:44

## 2021-01-01 RX ADMIN — KETOROLAC TROMETHAMINE 1 DROP: 5 SOLUTION/ DROPS OPHTHALMIC at 12:07

## 2021-01-01 RX ADMIN — LEVETIRACETAM 500 MG: 5 INJECTION INTRAVENOUS at 05:58

## 2021-01-01 RX ADMIN — IPRATROPIUM BROMIDE AND ALBUTEROL SULFATE 1 AMPULE: .5; 3 SOLUTION RESPIRATORY (INHALATION) at 11:45

## 2021-01-01 RX ADMIN — METRONIDAZOLE 500 MG: 500 INJECTION, SOLUTION INTRAVENOUS at 01:36

## 2021-01-01 RX ADMIN — FOLIC ACID 1 MG: 5 INJECTION, SOLUTION INTRAMUSCULAR; INTRAVENOUS; SUBCUTANEOUS at 09:19

## 2021-01-01 ASSESSMENT — PAIN SCALES - GENERAL
PAINLEVEL_OUTOF10: 0
PAINLEVEL_OUTOF10: 8
PAINLEVEL_OUTOF10: 0
PAINLEVEL_OUTOF10: 7
PAINLEVEL_OUTOF10: 0

## 2021-01-01 ASSESSMENT — PULMONARY FUNCTION TESTS
PIF_VALUE: 1

## 2021-01-01 ASSESSMENT — PAIN SCALES - PAIN ASSESSMENT IN ADVANCED DEMENTIA (PAINAD)
BODYLANGUAGE: 0
CONSOLABILITY: 0
TOTALSCORE: 0
BREATHING: 0
NEGVOCALIZATION: 0
BREATHING: 0
BREATHING: 0
TOTALSCORE: 0
BODYLANGUAGE: 0
BREATHING: 0
FACIALEXPRESSION: 0
TOTALSCORE: 0
TOTALSCORE: 0
BODYLANGUAGE: 0
TOTALSCORE: 0
BREATHING: 0
FACIALEXPRESSION: 0
FACIALEXPRESSION: 0
BODYLANGUAGE: 0
CONSOLABILITY: 0
CONSOLABILITY: 0
BODYLANGUAGE: 0

## 2021-01-01 ASSESSMENT — PAIN DESCRIPTION - LOCATION: LOCATION: GENERALIZED

## 2021-01-24 PROBLEM — R91.8 LUNG MASS: Chronic | Status: ACTIVE | Noted: 2021-01-01

## 2021-01-24 PROBLEM — J90 PLEURAL EFFUSION: Status: ACTIVE | Noted: 2021-01-01

## 2021-01-24 PROBLEM — J96.01 ACUTE HYPOXEMIC RESPIRATORY FAILURE (HCC): Status: ACTIVE | Noted: 2021-01-01

## 2021-01-24 NOTE — ED PROVIDER NOTES
Department of Emergency Medicine   ED  Provider Note  Admit Date/RoomTime: 1/24/2021 12:22 PM  ED Room: 2074/2722-W          History of Present Illness:  1/24/21, Time: 12:37 PM EST  Chief Complaint   Patient presents with    Hypoglycemia     BGL for ems 29, D10 250ml given,  now                Saurav Lobato is a 79 y.o. female presenting to the ED for altered mental status low blood sugar, beginning several hours. The complaint has been persistent, moderate in severity, and worsened by not eating and drinking. Patient presents via EMS from home for altered mental status and low blood sugar. History largely came from EMS, they report they were called to be because the patient was minimally responsive this morning. Her daughter went in to feed her, blood sugar was 40, EMS was called. She was 30, she was given dextrose IV and had improvement her mental status. EMS reports the patient is currently being treated for \"bone cancer\" and has reportedly not been eating or drinking very much. Patient is stumped on exam but is alert to person her birthday the current date and location. She denies any pain and states \"do not let me die\"    In triage her blood glucose was 100. She was 86% on room air and bradycardic. Review of Systems:   Unable to fully obtain d/t pt's mental status and acuity of illness        --------------------------------------------- PAST HISTORY ---------------------------------------------  Past Medical History:  has a past medical history of Arthritis, Cancer ([facility] COPD (chronic obstructive pulmonary disease) ([facility] GERD (gastroesophageal reflux disease), Glaucoma, Hypertension, Neuropathy, and Unspecified cerebral artery occlusion with cerebral infarction. Past Surgical History:  has a past surgical history that includes Breast surgery; Breast lumpectomy; Cholecystectomy; ECHO Compl W Dop Color Flow (1/9/2013); Hysterectomy; Colonoscopy (N/A, 6/4/2013); and eye surgery. Social History:  reports that she quit smoking about 23 years ago. She started smoking about 49 years ago. She has a 12.50 pack-year smoking history. She has never used smokeless tobacco. She reports that she does not drink alcohol or use drugs. Family History: family history includes Asthma in her sister; Cancer in her brother; Early Death (age of onset: 61) in her father; Heart Disease in her father; High Blood Pressure in her brother, father, and mother; Stroke in her brother and mother. . Unless otherwise noted, family history is non contributory    The patients home medications have been reviewed. Allergies: Alcohol, Eggs or egg-derived products, Rubbing alcohol [alc-cynara scolymus], Ethanol, Penicillins, and Sulfa antibiotics        ---------------------------------------------------PHYSICAL EXAM--------------------------------------    Constitutional/General: Somnolent on exam but arousable. She does answer her name her birthday knows is 2020 and she is at [facility]: Normocephalic and atraumatic cachectic appearance  Eyes: There appears to be a large clean abrasion over the left eye which is currently patched. She does track. Senile cataracts noted  Mouth: Oropharynx clear, handling secretions, no trismus, no asymmetry of the posterior oropharynx or uvular edema  Neck: Supple, full ROM, no stridor, no meningeal signs  Respiratory: Diminished throughout,Not in respiratory distress  Cardiovascular: Bradycardic and regular 2+ distal pulses. Equal extremity pulses. Chest: No chest wall tenderness previous surgical scars are well-healed  GI:  Abdomen Soft, Non tender, Non distended. No rebound, guarding, or rigidity.   Surgical scars from likely PEG tubes noted Musculoskeletal: Moves all extremities x 4. Warm and well perfused,  Capillary refill <3 seconds  Integument: skin warm and dry. Poor skin turgor  Neurologic: Glascow Coma Scale  Best Eye Response 3 - Opens eyes to loud noise or command   Best Verbal Response 5 - Alert and oriented   Best Motor Response 6 - Follows simple motor commands   Total 14           EKG: Interpreted by emergency department physician, Dr. Elisha Martinez   This EKG is signed and interpreted by me. Rate: 40  Rhythm: Regular narrow complex rhythm with motion artifact, likely bradycardic sinus  Interpretation: Regular narrow complex rhythm, motion artifact, difficult to discern P waves but likely underlying sinus rhythm. QRS is 6 2, QTc is 464, low voltage, this appears generally stable compared to prior EKG  Comparison: stable as compared to patient's most recent EKG      -------------------------------------------------- RESULTS -------------------------------------------------  I have personally reviewed all laboratory and imaging results for this patient. Results are listed below.      LABS: (Lab results interpreted by me)  Results for orders placed or performed during the hospital encounter of 01/24/21   Culture, Blood 1    Specimen: Blood   Result Value Ref Range    Blood Culture, Routine 24 Hours no growth    Culture, Blood 2    Specimen: Blood   Result Value Ref Range    Culture, Blood 2 24 Hours no growth    Lactate, Sepsis   Result Value Ref Range    Lactic Acid, Sepsis 2.5 (H) 0.5 - 1.9 mmol/L   Lactate, Sepsis   Result Value Ref Range    Lactic Acid, Sepsis 1.7 0.5 - 1.9 mmol/L   Troponin   Result Value Ref Range    Troponin <0.01 0.00 - 0.03 ng/mL   CBC Auto Differential   Result Value Ref Range    WBC 2.8 (L) 4.5 - 11.5 E9/L    RBC 2.48 (L) 3.50 - 5.50 E12/L    Hemoglobin 8.3 (L) 11.5 - 15.5 g/dL    Hematocrit 23.2 (L) 34.0 - 48.0 %    MCV 93.5 80.0 - 99.9 fL    MCH 33.5 26.0 - 35.0 pg    MCHC 35.8 (H) 32.0 - 34.5 % RDW 18.0 (H) 11.5 - 15.0 fL    Platelets 50 (L) 744 - 450 E9/L    MPV NOT CALC 7.0 - 12.0 fL    Neutrophils % 79.6 43.0 - 80.0 %    Lymphocytes % 15.9 (L) 20.0 - 42.0 %    Monocytes % 4.4 2.0 - 12.0 %    Eosinophils % 2.8 0.0 - 6.0 %    Basophils % 0.4 0.0 - 2.0 %    Neutrophils Absolute 2.24 1.80 - 7.30 E9/L    Lymphocytes Absolute 0.45 (L) 1.50 - 4.00 E9/L    Monocytes Absolute 0.11 0.10 - 0.95 E9/L    Eosinophils Absolute 0.00 (L) 0.05 - 0.50 E9/L    Basophils Absolute 0.00 0.00 - 0.20 E9/L    nRBC 4.4 /100 WBC    Anisocytosis 3+     Polychromasia 2+     Hypochromia 1+     Poikilocytes 2+     Ovalocytes 1+     Target Cells 2+    Comprehensive Metabolic Panel   Result Value Ref Range    Sodium 135 132 - 146 mmol/L    Potassium 4.1 3.5 - 5.0 mmol/L    Chloride 101 98 - 107 mmol/L    CO2 21 (L) 22 - 29 mmol/L    Anion Gap 13 7 - 16 mmol/L    Glucose 150 (H) 74 - 99 mg/dL    BUN 20 8 - 23 mg/dL    CREATININE 1.7 (H) 0.5 - 1.0 mg/dL    GFR Non-African American 35 >=60 mL/min/1.73    GFR African American 35     Calcium 7.8 (L) 8.6 - 10.2 mg/dL    Total Protein 5.4 (L) 6.4 - 8.3 g/dL    Alb 2.1 (L) 3.5 - 5.2 g/dL    Total Bilirubin 0.4 0.0 - 1.2 mg/dL    Alkaline Phosphatase 160 (H) 35 - 104 U/L    ALT 24 0 - 32 U/L    AST 42 (H) 0 - 31 U/L   Magnesium   Result Value Ref Range    Magnesium 1.6 1.6 - 2.6 mg/dL   Phosphorus   Result Value Ref Range    Phosphorus 3.4 2.5 - 4.5 mg/dL   Urinalysis   Result Value Ref Range    Color, UA Yellow Straw/Yellow    Clarity, UA Clear Clear    Glucose, Ur Negative Negative mg/dL    Bilirubin Urine Negative Negative    Ketones, Urine Negative Negative mg/dL    Specific Gravity, UA 1.015 1.005 - 1.030    Blood, Urine Negative Negative    pH, UA 5.5 5.0 - 9.0    Protein, UA Negative Negative mg/dL    Urobilinogen, Urine 0.2 <2.0 E.U./dL    Nitrite, Urine Negative Negative    Leukocyte Esterase, Urine TRACE (A) Negative   Brain Natriuretic Peptide   Result Value Ref Range Pro- 0 - 450 pg/mL   APTT   Result Value Ref Range    aPTT 40.6 (H) 24.5 - 35.1 sec   Protime-INR   Result Value Ref Range    Protime 19.7 (H) 9.3 - 12.4 sec    INR 1.7    COVID-19   Result Value Ref Range    SARS-CoV-2, NAAT Not Detected Not Detected   Blood Gas, Arterial   Result Value Ref Range    Date Analyzed [id number]     Time Analyzed 1246     Source: Blood Arterial     pH, Blood Gas 7.390 7.350 - 7.450    PCO2 33.3 (L) 35.0 - 45.0 mmHg    PO2 414.7 (H) 75.0 - 100.0 mmHg    HCO3 19.7 (L) 22.0 - 26.0 mmol/L    B.E. -4.7 (L) -3.0 - 3.0 mmol/L    O2 Sat 99.3 (H) 92.0 - 98.5 %    O2Hb 98.3 (H) 94.0 - 97.0 %    COHb 0.3 0.0 - 1.5 %    MetHb 0.7 0.0 - 1.5 %    O2 Content 12.9 mL/dL    HHb 0.7 0.0 - 5.0 %    tHb (est) 8.5 (L) 11.5 - 16.5 g/dL    Potassium 4.28 3.50 - 5.00 mmol/L    Mode NRB 15L     Date Of Collection      Time Collected      Pt Temp 37.0 C     ID [id number]     Lab [id number]     Critical(s) Notified .  No Critical Values    Platelet Confirmation   Result Value Ref Range    Platelet Confirmation CONFIRMED    Microscopic Urinalysis   Result Value Ref Range    WBC, UA NONE 0 - 5 /HPF    RBC, UA NONE 0 - 2 /HPF    Bacteria, UA NONE SEEN None Seen /HPF   APTT   Result Value Ref Range    aPTT 41.3 (H) 24.5 - 35.1 sec   Fibrinogen   Result Value Ref Range    Fibrinogen 97 (L) 225 - 540 mg/dL   Hemoglobin A1c   Result Value Ref Range    Hemoglobin A1C 5.1 4.0 - 5.6 %   CBC Auto Differential   Result Value Ref Range    WBC 2.6 (L) 4.5 - 11.5 E9/L    RBC 2.24 (L) 3.50 - 5.50 E12/L    Hemoglobin 7.3 (L) 11.5 - 15.5 g/dL    Hematocrit 20.8 (L) 34.0 - 48.0 %    MCV 92.9 80.0 - 99.9 fL    MCH 32.6 26.0 - 35.0 pg    MCHC 35.1 (H) 32.0 - 34.5 %    RDW 17.7 (H) 11.5 - 15.0 fL    Platelets 40 (L) 630 - 450 E9/L    MPV NOT CALC 7.0 - 12.0 fL    Neutrophils % 85.1 (H) 43.0 - 80.0 %    Lymphocytes % 8.8 (L) 20.0 - 42.0 %    Monocytes % 2.6 2.0 - 12.0 %    Eosinophils % 2.6 0.0 - 6.0 % Basophils % 0.9 0.0 - 2.0 %    Neutrophils Absolute 2.21 1.80 - 7.30 E9/L    Lymphocytes Absolute 0.23 (L) 1.50 - 4.00 E9/L    Monocytes Absolute 0.08 (L) 0.10 - 0.95 E9/L    Eosinophils Absolute 0.07 0.05 - 0.50 E9/L    Basophils Absolute 0.02 0.00 - 0.20 E9/L    nRBC 4.4 /100 WBC    Anisocytosis 2+     Polychromasia 2+     Hypochromia 1+     Poikilocytes 2+     Ovalocytes 1+     Target Cells 2+     Tear Drop Cells 1+    Platelet Confirmation   Result Value Ref Range    Platelet Confirmation CONFIRMED    Ammonia   Result Value Ref Range    Ammonia 30.0 11.0 - 51.0 umol/L   TSH WITHOUT REFLEX   Result Value Ref Range    TSH 3.470 0.270 - 4.200 uIU/mL   SPECIMEN REJECTION   Result Value Ref Range    Rejected Test gluc     Reason for Rejection see below    CBC Auto Differential   Result Value Ref Range    WBC 2.1 (L) 4.5 - 11.5 E9/L    RBC 2.10 (L) 3.50 - 5.50 E12/L    Hemoglobin 7.1 (L) 11.5 - 15.5 g/dL    Hematocrit 19.8 (L) 34.0 - 48.0 %    MCV 94.3 80.0 - 99.9 fL    MCH 33.8 26.0 - 35.0 pg    MCHC 35.9 (H) 32.0 - 34.5 %    RDW 18.0 (H) 11.5 - 15.0 fL    Platelets 36 (L) 139 - 450 E9/L    MPV 10.7 7.0 - 12.0 fL    Neutrophils % 76.0 43.0 - 80.0 %    Lymphocytes % 20.0 20.0 - 42.0 %    Monocytes % 3.0 2.0 - 12.0 %    Eosinophils % 1.0 0.0 - 6.0 %    Basophils % 0.0 0.0 - 2.0 %    Neutrophils Absolute 1.60 (L) 1.80 - 7.30 E9/L    Lymphocytes Absolute 0.42 (L) 1.50 - 4.00 E9/L    Monocytes Absolute 0.06 (L) 0.10 - 0.95 E9/L    Eosinophils Absolute 0.02 (L) 0.05 - 0.50 E9/L    Basophils Absolute 0.00 0.00 - 0.20 E9/L    nRBC 5.0 /100 WBC    Anisocytosis 2+     Polychromasia 1+     Hypochromia 1+     Poikilocytes 2+     Schistocytes 2+     Ovalocytes 1+     Target Cells 2+     Tear Drop Cells 1+    GLUCOSE, RANDOM   Result Value Ref Range    Glucose 106 (H) 74 - 99 mg/dL   Platelet Confirmation   Result Value Ref Range    Platelet Confirmation CONFIRMED    Ferritin   Result Value Ref Range    Ferritin 296 ng/mL Lactate Dehydrogenase   Result Value Ref Range     135 - 214 U/L   Vitamin B12 & Folate   Result Value Ref Range    Vitamin B-12 1634 (H) 211 - 946 pg/mL    Folate 2.3 (L) 4.8 - 24.2 ng/mL   Reticulocytes   Result Value Ref Range    Retic Ct Pct 3.7 (H) 0.4 - 1.9 %    Retic Ct Abs 0.080 E12/L    Immature Retic Fract 34.5 (H) 3.0 - 15.9 %    Hematocrit 20.4 (L) 34.0 - 48.0 %    Retic HGB Equivalent 24.0 (L) 28.2 - 36.6 pg   Iron and TIBC   Result Value Ref Range    Iron 105 37 - 145 mcg/dL    TIBC 94 (L) 250 - 450 mcg/dL    Iron Saturation 112 (H) 15 - 50 %   CBC Auto Differential   Result Value Ref Range    WBC 2.3 (L) 4.5 - 11.5 E9/L    RBC 2.18 (L) 3.50 - 5.50 E12/L    Hemoglobin 7.1 (L) 11.5 - 15.5 g/dL    MCV 93.6 80.0 - 99.9 fL    MCH 32.6 26.0 - 35.0 pg    MCHC 34.8 (H) 32.0 - 34.5 %    RDW 18.1 (H) 11.5 - 15.0 fL    Platelets 37 (L) 379 - 450 E9/L    MPV NOT CALC 7.0 - 12.0 fL   POCT Glucose   Result Value Ref Range    Meter Glucose 106 (H) 74 - 99 mg/dL   POCT Glucose   Result Value Ref Range    Meter Glucose 76 74 - 99 mg/dL   POCT Glucose   Result Value Ref Range    Meter Glucose 40 (L) 74 - 99 mg/dL   POCT Glucose   Result Value Ref Range    Meter Glucose 285 (H) 74 - 99 mg/dL   POCT Glucose   Result Value Ref Range    Meter Glucose 87 74 - 99 mg/dL   POCT Glucose   Result Value Ref Range    Meter Glucose 75 74 - 99 mg/dL   POCT Glucose   Result Value Ref Range    Meter Glucose 82 74 - 99 mg/dL   POCT Glucose   Result Value Ref Range    Meter Glucose <40 (L) 74 - 99 mg/dL   POCT Glucose   Result Value Ref Range    Meter Glucose <40 (L) 74 - 99 mg/dL   POCT Glucose   Result Value Ref Range    Meter Glucose 55 (L) 74 - 99 mg/dL   POCT Glucose   Result Value Ref Range    Meter Glucose 69 (L) 74 - 99 mg/dL   POCT Glucose   Result Value Ref Range    Meter Glucose <40 (L) 74 - 99 mg/dL   POCT Glucose   Result Value Ref Range    Meter Glucose 97 74 - 99 mg/dL   POCT Glucose   Result Value Ref Range Meter Glucose <40 (L) 74 - 99 mg/dL   POCT Glucose   Result Value Ref Range    Meter Glucose 142 (H) 74 - 99 mg/dL   EKG 12 Lead   Result Value Ref Range    Ventricular Rate 40 BPM    Atrial Rate 300 BPM    QRS Duration 62 ms    Q-T Interval 570 ms    QTc Calculation (Bazett) 464 ms    R Axis 37 degrees    T Axis 73 degrees   EKG 12 Lead   Result Value Ref Range    Ventricular Rate 43 BPM    Atrial Rate 31 BPM    QRS Duration 96 ms    Q-T Interval 644 ms    QTc Calculation (Bazett) 544 ms    R Axis 38 degrees    T Axis 69 degrees   TYPE AND SCREEN   Result Value Ref Range    ABO/Rh O POS     Antibody Screen NEG    PREPARE RBC (CROSSMATCH), 1 Units   Result Value Ref Range    Product Code Blood Bank [id number]     Description Blood Bank Red Blood Cells, Leuko-reduced     Unit Number [id number]     Dispense Status Blood Bank selected    ,       RADIOLOGY:  Interpreted by Radiologist unless otherwise specified  CT ABDOMEN PELVIS WO CONTRAST Additional Contrast? None   Final Result   Diffuse wall thickening and edema of the ascending and transverse colon   concerning for colitis. There is a large amount of inflammatory ascites in   the pelvis. Atelectasis/infiltrates and pleural effusion lung bases likely pneumonia or   edema. Collapsed stomach with wall thickening. Gastritis is a consideration. Possible decubitus ulcer in the left gluteus region and sclerotic lesion on   T10 concerning for developing bone metastasis. CT HEAD WO CONTRAST   Final Result   No acute intracranial abnormality. CT CHEST WO CONTRAST   Final Result   22 mm spiculated nodule within the lateral aspect of the left upper lobe with   central cavitation. The lesion is concerning for underlying lung malignancy. Considering the size and appearance either tissue sampling or PET-CT   examination can be performed   Moderate centrilobular emphysematous changes of the lungs. Moderate right pleural effusion and atelectatic changes of right lung base. Significant colonic wall thickening with surrounding fatty stranding is   highly concerning for colitis. For further evaluation CT of the abdomen can   be obtained. XR CHEST PORTABLE   Final Result   Stable nonacute chest with atelectasis in the lung bases. ------------------------- NURSING NOTES AND VITALS REVIEWED ---------------------------   The nursing notes within the ED encounter and vital signs as below have been reviewed by myself  BP (!) 88/51   Pulse 85   Temp 97 °F (36.1 °C) (Oral)   Resp 14   Ht 5' 3\" (1.6 m)   Wt 149 lb (67.6 kg)   SpO2 98%   BMI 26.39 kg/m²     Oxygen Saturation Interpretation: Abnormal    The cardiac monitor revealed kymberly with a heart rate in the 40-50s as interpreted by me. The cardiac monitor was ordered secondary to the patient's heart rate and to monitor the patient for dysrhythmia. CPT 00826    The patients available past medical records and past encounters were reviewed.         ------------------------------ ED COURSE/MEDICAL DECISION MAKING----------------------  Medications   albuterol (PROVENTIL) nebulizer solution 2.5 mg (has no administration in time range)   latanoprost (XALATAN) 0.005 % ophthalmic solution 1 drop (1 drop Left Eye Given 1/25/21 2107)   brimonidine (ALPHAGAN) 0.2 % ophthalmic solution 1 drop (1 drop Left Eye Not Given 1/25/21 1250)   gabapentin (NEURONTIN) capsule 300 mg ( Oral Automatically Held 1/28/21 1400)   gabapentin (NEURONTIN) capsule 400 mg ( Oral Automatically Held 1/30/21 2100)   pantoprazole (PROTONIX) tablet 40 mg (40 mg Oral Given 1/25/21 0606)   cetirizine (ZYRTEC) tablet 10 mg (10 mg Oral Given 1/25/21 0936)   verapamil (CALAN SR) extended release tablet 180 mg ( Oral Automatically Held 1/30/21 2100)   metronidazole (FLAGYL) 500 mg in NaCl 100 mL IVPB premix (500 mg Intravenous New Bag 1/25/21 2019) cefTRIAXone (ROCEPHIN) 1,000 mg in sterile water 10 mL IV syringe (1,000 mg Intravenous New Bag 1/25/21 2048)   glucose (GLUTOSE) 40 % oral gel 15 g (15 g Oral Given 1/25/21 0853)   dextrose 50 % IV solution (12.5 g Intravenous Given 1/25/21 2040)   glucagon (rDNA) injection 1 mg (has no administration in time range)   dextrose 5 % solution (has no administration in time range)   insulin lispro (HUMALOG) injection vial 0-6 Units (0 Units Subcutaneous Not Given 1/25/21 1702)   insulin lispro (HUMALOG) injection vial 0-3 Units (0 Units Subcutaneous Not Given 1/25/21 1929)   budesonide (PULMICORT) nebulizer suspension 250 mcg (250 mcg Nebulization Given 1/25/21 2115)     And   Arformoterol Tartrate (BROVANA) nebulizer solution 15 mcg (15 mcg Nebulization Given 1/25/21 2114)   dextrose 10 % infusion ( Intravenous New Bag 1/25/21 1602)   0.9 % sodium chloride infusion (has no administration in time range)   glucagon (rDNA) injection 1 mg (1 mg Intramuscular Given 1/24/21 1248)   levoFLOXacin (LEVAQUIN) 750 MG/150ML infusion 750 mg (0 mg Intravenous Stopped 1/24/21 1908)   metronidazole (FLAGYL) 500 mg in NaCl 100 mL IVPB premix (0 mg Intravenous Stopped 1/25/21 0243)   0.9 % sodium chloride bolus (0 mLs Intravenous Stopped 1/25/21 0056)                    Medical Decision Making:     I, Dr. Jovita Macias am the primary provider of record    Patient presents altered with hypoglycemia. She is bradycardic hypoxic upon arrival.  Work-up undertaken, evidence of colitis as well as likely lung malignancy. Spoke with patient daughter, she states she does not have a known lung tumor. She is requiring supplemental oxygen. Cultures antibiotics initiated. Spoke with medicine patient kept for further care      Re-Evaluations:       Time: 1400  Re-evaluation.   Patients symptoms show no change  Repeat physical examination is not changed      Sepsis Re-examination  1/24/21   1600 PM EST          Vital Signs:   Vitals: 01/25/21 0315 01/25/21 0609 01/25/21 0730 01/25/21 2057   BP: (!) 106/54 117/66 111/66 (!) 88/51   Pulse: 55 67 67 85   Resp: 12 16 16 14   Temp: 97 °F (36.1 °C) 97 °F (36.1 °C) 98.2 °F (36.8 °C) 97 °F (36.1 °C)   TempSrc: Temporal Temporal Temporal Oral   SpO2: 100%  98% 98%   Weight:       Height:         Card/Pulm:  Rhythm: slow rate. Heart Sounds: Normal S1, S2. unlabored breathing and requiring supplemental oxygen. Capillary Refill: normal.  Radial Pulse:  present 2+. Skin:  Warm. This patient's ED course included: a personal history and physicial examination, multiple bedside re-evaluations, IV medications, cardiac monitoring, continuous pulse oximetry and complex medical decision making and emergency management    This patient has been closely monitored during their ED course. Consultations:  Internal Medicine       Critical Care:   Please note that the withdrawal or failure to initiate urgent interventions for this patient would likely result in a life threatening deterioration or permanent disability. Accordingly this patient received 36 minutes of critical care time, excluding separately billable procedures. Counseling: The emergency provider has spoken with the patient and family member patient and daughter and discussed todays results, in addition to providing specific details for the plan of care and counseling regarding the diagnosis and prognosis. Questions are answered at this time and they are agreeable with the plan.       --------------------------------- IMPRESSION AND DISPOSITION ---------------------------------    IMPRESSION  1. Hypoxia    2. Lung mass    3. Failure to thrive in adult    4. Dehydration    5. Hypoglycemia    6.  Colitis        DISPOSITION  Disposition: Admit  Patient condition is stable NOTE: This report was transcribed using voice recognition software.  Every effort was made to ensure accuracy; however, inadvertent computerized transcription errors may be present       Betzaida Olivo DO  01/25/21 3352

## 2021-01-24 NOTE — ED NOTES
Pulse ox reading 86% on 15L NRB. Additional 6L NC applied and Dr. Lynnie Romberg notified.      Jorge Thorne RN  01/24/21 2203

## 2021-01-25 PROBLEM — R62.7 FAILURE TO THRIVE IN ADULT: Status: ACTIVE | Noted: 2021-01-01

## 2021-01-25 NOTE — CARE COORDINATION
Social Work Discharge/Planning:    Palliative is consulted. SW met with patient and patient's daughter/POA Janine Kirkland [phone]) to explain role and discuss transition of care. Patient was sleeping at the time, SW diverted questions to patient's POA/daughter. Per patient's daughter, Patient is from home. Patient lives alone in a 1 story home that has a ramp entrance from the front and a stair glide to enter from the back of the home. Patient's daughter/POA reports living right around the corner from patient and that patient has a lot of support and care. Patient receives aide services through [employer] (Tuesday and Thursday from 9am-12pm). Patient's daughter denies ADELA/SNF hx. Patient has [facility] hx with [facility] and family would like for patient to use [facility] again. [facility] orders are in. 1435 SW made referral with liaalexandra Morocho with [facility]. Patient's PCP is Dr. Ashley Nuno. Patient's pharmacy is [facility] located on [address] [facility] [address] Patient's daughter reports plan is for patient to return home once medically clear for discharge with [facility]. Daughter is able to transport patient home upon discharge. SW to follow up with patient's daughter after PT/OT evals to confirm transportation arrangements. SW/CM to follow for any needs that may arise upon discharge.     FELIX Guy  [phone]

## 2021-01-25 NOTE — CONSULTS
Palliative Care Department  [phone]  Palliative Care Initial Consult  Provider Sneha Ratliff PA-C    Leola Branch  [phone]  Hospital Day: 2    Referring Provider: Ernestine Gallardo MD  Palliative Medicine was consulted for assistance with: Goals of care    CHIEF COMPLAINT: Mental status and hypoglycemia  Brief summary: 80-year-old female with metastatic breast cancer on oral chemotherapy. ASSESSMENT/PLAN:     Pertinent hospital diagnoses:  1. Hypoglycemia  -Per primary service    2. Metastatic breast cancer  -Oncology consulted  -Ibrance, oral medication daily    3. Thrombocytopenia/leukopenia  -Likely secondary to chemotherapy  -Trend labs    4. Cavitary lung mass   -Pulmonology consulted    PALLIATIVE CARE ENCOUNTER  - Outcome of goals of care meeting:   -Consult hospice for information only  - Capacity: At this time, Leola Branch, Does Not have capacity for medical decision-making. Capacity is time limited and situation/question specific  - Surrogate decision maker/Legal NOK:    -Yuniel Malagon [phone]  - Code Status:   full  - Advanced directives: On chart  - Spiritual assessment: No needs identified  - Bereavement and grief: None identified    - DISPO: Continued treatment, hospice consult    Referrals to: Hospice    HPI:   Leola Branch is a 79 y.o. with a past medical history of diabetes mellitus, GERD, COPD, metastatic breast cancer, hypertension, neuropathy, arthritis, glaucoma who presented to the emergency department from home with altered mental status and hypoglycemia.       Patient completed workup in the ED and was admitted on 1/24/2021 CT of chest revealed moderate centrilobular emphysema, 8 2.2 cm spiculated nodule with central cavitation and a moderate right pleural effusion. Additionally colonic thickening with surrounding fatty stranding is concerning for colitis. CT of the abdomen and pelvis revealed a large amount of ascites in the pelvis, decubitus ulcer in the left gluteus region and a sclerotic lesion on T10 concerning for developing bone metastasis. Was elevated to 1.7. Lactic acid elevated to 2.5. Albumin decreased to 2.1. RRT was called due to altered mental status and bradycardia patient was treated for hypoglycemia with improvement. White count was decreased overall to 2.8. H/H 8.3/23. 2. Platelets decreased at 50,000. Comparison to previous labs patient persistently leukopenic however platelet count has been variable. Patient is on Ibrance orally for metastatic breast cancer. Details of Conversation: Palliative medicine services introduced to the patient's daughter Tamia Tierney who is at bedside. We reviewed patient's overall performance status and that she became significantly worse over the last couple days before presenting to the hospital.  Otherwise she is able to be more active awake and alert and enjoys a fair quality of life. She was attempting to feed her mother lunch and she did have an episode of emesis and currently patient is being cleaned up. We reviewed levels of CODE STATUS identified in [place]. Tamia Tierney states the patient has a living will and healthcare power of  and she is the primary decision-maker. She did identify they are uncertain what their goals are currently and had questions in regards to what services hospice provide. I provided her a choice of hospices and she did wish to speak with hospice [facility] for information only. She wishes to speak further with her family members and mother before changing her CODE STATUS.     Past Medical History:   Diagnosis Date    Arthritis  Cancer ([facility]     breast    COPD (chronic obstructive pulmonary disease) ([facility] 6/27/2019    Diabetes mellitus (HCC)     GERD (gastroesophageal reflux disease)     Glaucoma     Hypertension     Neuropathy     Unspecified cerebral artery occlusion with cerebral infarction        Past Surgical History:   Procedure Laterality Date    BREAST LUMPECTOMY      BREAST SURGERY      right partial mastectomy    CHOLECYSTECTOMY      COLONOSCOPY N/A 6/4/2013    DIVERTICULOSIS;POLYPS @15CM    ECHO COMPL W DOP COLOR FLOW  1/9/2013         EYE SURGERY      HYSTERECTOMY       Inpatient medications reviewed: yes  Home Medications reviewed: yes    Allergies   Allergen Reactions    Alcohol     Eggs Or Egg-Derived Products     Rubbing Alcohol [Alc-Cynara Scolymus] Hives    Ethanol Rash     Patient gets blisters       Penicillins Rash    Sulfa Antibiotics Itching and Rash     Family History   Problem Relation Age of Onset    High Blood Pressure Mother     Stroke Mother     Early Death Father 61        liver disease    Heart Disease Father     High Blood Pressure Father     Asthma Sister     Cancer Brother     High Blood Pressure Brother     Stroke Brother        Social history:   status: no  Marital status: Single  Living status: with family:  daughter   Work history: unknown  Tobacco use: no  Drug use: no  Alcohol use: no    Review of Systems:  unable to obtained due to current condition of patient    OBJECTIVE:   Prognosis: Poor    Physical Exam:  /66   Pulse 67   Temp 98.2 °F (36.8 °C) (Temporal)   Resp 16   Ht 5' 3\" (1.6 m)   Wt 149 lb (67.6 kg)   SpO2 98%   BMI 26.39 kg/m²   Gen:  Elderly, thin, funded, ill-appearing has vomitus over close currently   HEENT:  Normocephalic, atraumatic, mucosa dry, sclera anicteric  Neck:  Supple, trachea midline  Lungs:  respirations unlabored  Heart[de-identified]  RRR  Abd:  Soft, non tender, non distended, bowel sounds present, left upper quadrant scar Ext:  Moving all extremities, muscular atrophy of lower extremities, no edema, pulses present  Skin:  Xerotic skin, without rash, bruising, petechiae  Neuro: Sleepy does wake with verbal stimuli and follows some commands     Objective data reviewed: labs, images, records, medication use, vitals and chart  Relevant data: Per HPI    Time/Communication  Greater than 50% of time spent, total 70 minutes in counseling and coordination of care at the bedside/over the telephone regarding goals of care, symptom management, diagnosis and prognosis and see above. Thank you for allowing Palliative Medicine to participate in the care of Jami Prater. Provider Miah Du PA-C  Palliative Medicine    Note: This report was completed using computerPositionly voiced recognition software. Every effort has been made to ensure accuracy; however, inadvertent computerized transcription errors may be present.

## 2021-01-25 NOTE — PLAN OF CARE
Problem: Falls - Risk of:  Goal: Will remain free from falls  Description: Will remain free from falls  1/25/2021 1131 by Marina Barrera RN  Outcome: Met This Shift  1/25/2021 0117 by Sneha Tran RN  Outcome: Met This Shift     Problem: Falls - Risk of:  Goal: Absence of physical injury  Description: Absence of physical injury  1/25/2021 1131 by Marina Barrera RN  Outcome: Met This Shift  1/25/2021 0117 by Sneha Tran RN  Outcome: Met This Shift     Problem: Skin Integrity:  Goal: Will show no infection signs and symptoms  Description: Will show no infection signs and symptoms  1/25/2021 1131 by Marina Barrera RN  Outcome: Met This Shift  1/25/2021 0117 by Sneha Tran RN  Outcome: Met This Shift     Problem: Skin Integrity:  Goal: Absence of new skin breakdown  Description: Absence of new skin breakdown  1/25/2021 1131 by Marina Barrera RN  Outcome: Met This Shift  1/25/2021 0117 by Sneha Tran RN  Outcome: Met This Shift

## 2021-01-25 NOTE — CONSULTS
[facility]  [address] [facility], [address]  [facility], [address] [place] [address]    Pulmonary Consultation      Reason for Consult:  Abnormal radiograph    Requesting Physician:  Esther Crowley MD    CHIEF COMPLAINT:  Change mental status    History Obtained From:  family member - daughter, electronic medical record    HISTORY OF PRESENT ILLNESS:                The patient is a 79 y.o. female with significant past medical history of Breast cancer on chemotherapy who presents with change in mental status. Daughter states has been having decrease in appetite for the past few weeks. Also had problems with a sore/blister on her right leg that was being treated with antibiotics for the past 2-3 weeks. When asked about respiratory function and cough, daughter states didn't have that problem prior to admission, did say had some cough and minimal sputum production after eating today. Currently lying down in bed on 4 li NC, no coughing during examination, conversant and appropriate, looking comfortable with breathing.     Past Medical History:        Diagnosis Date    Arthritis     Cancer ([facility]     breast    COPD (chronic obstructive pulmonary disease) ([facility] 6/27/2019    Diabetes mellitus (HCC)     GERD (gastroesophageal reflux disease)     Glaucoma     Hypertension     Neuropathy     Unspecified cerebral artery occlusion with cerebral infarction        Past Surgical History:        Procedure Laterality Date    BREAST LUMPECTOMY      BREAST SURGERY      right partial mastectomy    CHOLECYSTECTOMY      COLONOSCOPY N/A 6/4/2013    DIVERTICULOSIS;POLYPS @15CM    ECHO COMPL W DOP COLOR FLOW  1/9/2013         EYE SURGERY      HYSTERECTOMY         Current Medications:    Current Facility-Administered Medications: albuterol (PROVENTIL) nebulizer solution 2.5 mg, 2.5 mg, Nebulization, Q4H PRN latanoprost (XALATAN) 0.005 % ophthalmic solution 1 drop, 1 drop, Left Eye, QPM  brimonidine (ALPHAGAN) 0.2 % ophthalmic solution 1 drop, 1 drop, Left Eye, QAM  [Held by provider] gabapentin (NEURONTIN) capsule 300 mg, 300 mg, Oral, BID  [Held by provider] gabapentin (NEURONTIN) capsule 400 mg, 400 mg, Oral, Nightly  pantoprazole (PROTONIX) tablet 40 mg, 40 mg, Oral, Daily  cetirizine (ZYRTEC) tablet 10 mg, 10 mg, Oral, Daily  [Held by provider] verapamil (CALAN SR) extended release tablet 180 mg, 180 mg, Oral, BID  metronidazole (FLAGYL) 500 mg in NaCl 100 mL IVPB premix, 500 mg, Intravenous, Q8H  cefTRIAXone (ROCEPHIN) 1,000 mg in sterile water 10 mL IV syringe, 1,000 mg, Intravenous, Q24H  glucose (GLUTOSE) 40 % oral gel 15 g, 15 g, Oral, PRN  dextrose 50 % IV solution, 12.5 g, Intravenous, PRN  glucagon (rDNA) injection 1 mg, 1 mg, Intramuscular, PRN  dextrose 5 % solution, 100 mL/hr, Intravenous, PRN  insulin lispro (HUMALOG) injection vial 0-6 Units, 0-6 Units, Subcutaneous, TID WC  insulin lispro (HUMALOG) injection vial 0-3 Units, 0-3 Units, Subcutaneous, Nightly  budesonide (PULMICORT) nebulizer suspension 250 mcg, 0.25 mg, Nebulization, BID **AND** Arformoterol Tartrate (BROVANA) nebulizer solution 15 mcg, 15 mcg, Nebulization, BID  dextrose 10 % infusion, , Intravenous, Continuous    Allergies:  Alcohol, Eggs or egg-derived products, Rubbing alcohol [alc-cynara scolymus], Ethanol, Penicillins, and Sulfa antibiotics    Social History:    TOBACCO:   reports that she quit smoking about 23 years ago. She started smoking about 49 years ago. She has a 12.50 pack-year smoking history.  She has never used smokeless tobacco.    Family History:       Problem Relation Age of Onset    High Blood Pressure Mother     Stroke Mother     Early Death Father 61        liver disease    Heart Disease Father     High Blood Pressure Father     Asthma Sister     Cancer Brother     High Blood Pressure Brother  Stroke Brother        REVIEW OF SYSTEMS:    Review of systems not obtained due to patient factors - mental status  Remainder of complete ROS is negative. PHYSICAL EXAM:      Vitals:    /66   Pulse 67   Temp 98.2 °F (36.8 °C) (Temporal)   Resp 16   Ht 5' 3\" (1.6 m)   Wt 149 lb (67.6 kg)   SpO2 98%   BMI 26.39 kg/m²     EYES:  Lids and lashes normal, pupils equal, round and reactive to light, extra ocular muscles intact, sclera clear, conjunctiva normal  ENT:  Normocephalic, without obvious abnormality, atraumatic, sinuses nontender on palpation, external ears without lesions, oral pharynx with moist mucus membranes, tonsils without erythema or exudates, gums normal and good dentition. NECK:  Supple, symmetrical, trachea midline, no adenopathy, thyroid symmetric, not enlarged and no tenderness, skin normal  LUNGS:  Minimal ronchi bibasal with cough  CARDIOVASCULAR:  Normal apical impulse, regular rate and rhythm, normal S1 and S2, no S3 or S4, and no murmur noted  ABDOMEN:  No scars, normal bowel sounds, soft, non-distended, non-tender, no masses palpated, no hepatosplenomegally  MUSCULOSKELETAL:  Surgical dressing right lower leb  NEUROLOGIC:  Awake, alert, oriented to name, place and time. Cranial nerves II-XII are grossly intact. Motor is 5 out of 5 bilaterally. Cerebellar finger to nose, heel to shin intact. Sensory is intact.   Babinski down going, Romberg negative, and gait is normal.    DATA:    CBC:   Recent Labs     01/24/21  1242 01/24/21  1814   WBC 2.8* 2.6*   HGB 8.3* 7.3*   HCT 23.2* 20.8*   MCV 93.5 92.9   PLT 50* 40*       BMP:  Recent Labs     01/24/21  1242 01/24/21  1246     --    K 4.1 4.28     --    CO2 21*  --    PHOS 3.4  --    BUN 20  --    CREATININE 1.7*  --     ALB:3,BILIDIR:3,BILITOT:3,ALKPHOS:3)@    PT/INR:   Recent Labs     01/24/21  1242   PROTIME 19.7*   INR 1.7       ABG:   Recent Labs     01/24/21  1246   PH 7.390   PO2 414.7*   PCO2 33.3* HCO3 19.7*   BE -4.7*   O2SAT 99.3*   METHB 0.7   O2HB 98.3*   COHB 0.3   O2CON 12.9   HHB 0.7   THB 8.5*             Radiology Review:  CT chest reviewed with emphysema bilateral upper lobes, (+) nodular mass lingula periphery with some cavitation, similar right pleural effusion    IMPRESSION/RECOMMENDATIONS:      Lung mass  Hx of breast cancer on chemotherapy  Hypoxia    1. Discussed CT biopsy with IR to diagnose lung nodule. Daughter wants to talk with oncologist about procedure if will go through with it or not  2. Cont with oxygen, taper as tolerated  3. Cont with nebs, watch respiratory function and cough/sputum production if any, luke if due to po intake  4. Cont with antibiotics, wound care      Time at the bedside, reviewing labs and radiographs, reviewing notes and consultations, discussing with staff and family was more than 50 minutes. Thanks for letting us see this patient in consultation. Please contact us with any questions. Office [phone] or after hours through [employer], x [phone].

## 2021-01-25 NOTE — H&P
Leeroy Sorto M.D. History and Physical      CHIEF COMPLAINT: Change in mentation    Reason for Admission: Altered mental status/hypoglycemia    History Obtained From: EMR    HISTORY OF PRESENT ILLNESS:      The patient is a 79 y.o. female of Ila Culver MD with significant past medical history of breast cancer with mets to the bones, hypertension, COPD, diabetes mellitus non-insulin-dependent, neuropathy who presents with complaints of change in mentation at home per family. Patient has apparently been receiving her diabetic medications however has not been eating or drinking at her baseline since initiation of what appears to be chemotherapy. She was also reportedly bradycardic and hypotensive. Notably she is on verapamil at home. She was noted to be altered and therefore EMS was called. Her blood sugars were checked and noted to be in the 30-40 range. She was given IV dextrose and subsequently transferred to the ER for further evaluation and treatment. On my evaluation, seh is covered up in multiple blankets , no acute complaints otherwise.  hc comes from daughter   /66   Pulse 67   Temp 98.2 °F (36.8 °C) (Temporal)   Resp 16   Ht 5' 3\" (1.6 m)   Wt 149 lb (67.6 kg)   SpO2 98%   BMI 26.39 kg/m²     All labs personally reviewed - pancytopenia   All imaging personally reviewedCT abdomen pelvis with questionable colitis    Past Medical History:        Diagnosis Date    Arthritis     Cancer ([facility]     breast    COPD (chronic obstructive pulmonary disease) ([facility] 6/27/2019    Diabetes mellitus (HCC)     GERD (gastroesophageal reflux disease)     Glaucoma     Hypertension     Neuropathy     Unspecified cerebral artery occlusion with cerebral infarction      Past Surgical History:        Procedure Laterality Date    BREAST LUMPECTOMY      BREAST SURGERY      right partial mastectomy    CHOLECYSTECTOMY      COLONOSCOPY N/A 6/4/2013 DIVERTICULOSIS;POLYPS @15CM    ECHO COMPL W DOP COLOR FLOW  1/9/2013         EYE SURGERY      HYSTERECTOMY           Medications Prior to Admission:    Medications Prior to Admission: fluticasone-vilanterol (BREO ELLIPTA) 100-25 MCG/INH AEPB inhaler, Inhale 1 puff into the lungs daily  potassium chloride (MICRO-K) 10 MEQ extended release capsule, Take 20 mEq by mouth 2 times daily  albuterol (PROVENTIL) (2.5 MG/3ML) 0.083% nebulizer solution, Take 3 mLs by nebulization every 6 hours as needed for Wheezing  dexamethasone (DECADRON) 0.1 % ophthalmic solution, 1 drop  clobetasol prop emollient base 0.05 % CREA,   gabapentin (NEURONTIN) 400 MG capsule, nightly  ofloxacin (OCUFLOX) 0.3 % solution, Place 1 drop into both eyes   minocycline (MINOCIN;DYNACIN) 100 MG capsule, Take 100 mg by mouth 2 times daily  palbociclib (IBRANCE) 75 MG capsule, 100 mg   gabapentin (NEURONTIN) 300 MG capsule, Take 300 mg by mouth 2 times daily. albuterol (PROVENTIL HFA;VENTOLIN HFA) 108 (90 BASE) MCG/ACT inhaler, Inhale 2 puffs into the lungs every 6 hours as needed for Wheezing. Oyster Shell 500 MG TABS, Take 500 mg by mouth daily. verapamil (CALAN-SR) 180 MG CR tablet, Take 180 mg by mouth 2 times daily. Lansoprazole (PREVACID PO), Take 30 mg by mouth daily. losartan-hydrochlorothiazide (HYZAAR) 50-12.5 MG per tablet, Take 1 tablet by mouth daily. Loratadine (CLARITIN) 10 MG CAPS, Take 1 capsule by mouth daily. Bimatoprost (LUMIGAN OP), Place 1 drop into the left eye every evening. Brimonidine Tartrate (ALPHAGAN P OP), Place 2 drops into the left eye every morning.   glimepiride (AMARYL) 1 MG tablet, Take 1 tablet by mouth 2 times daily (with meals) If eating (Patient taking differently: Take 1 mg by mouth daily If eating)    Allergies:  Alcohol, Eggs or egg-derived products, Rubbing alcohol [alc-cynara scolymus], Ethanol, Penicillins, and Sulfa antibiotics    Social History: TOBACCO:   reports that she quit smoking about 23 years ago. She started smoking about 49 years ago. She has a 12.50 pack-year smoking history. She has never used smokeless tobacco.  ETOH:   reports no history of alcohol use. MARITAL STATUS:    OCCUPATION:      Family History:       Problem Relation Age of Onset    High Blood Pressure Mother     Stroke Mother     Early Death Father 61        liver disease    Heart Disease Father     High Blood Pressure Father     Asthma Sister     Cancer Brother     High Blood Pressure Brother     Stroke Brother        REVIEW OF SYSTEMS:    General ROS: positive for  - fatigue and malaise  Hematological and Lymphatic ROS: negative  Endocrine ROS: negative  Respiratory ROS: no cough, shortness of breath, or wheezing  Cardiovascular ROS: no chest pain or dyspnea on exertion  Gastrointestinal ROS: no abdominal pain, change in bowel habits, or black or bloody stools  Genito-Urinary ROS: no dysuria, trouble voiding, or hematuria  Neurological ROS: no TIA or stroke symptoms  negative    Vitals:  /66   Pulse 67   Temp 98.2 °F (36.8 °C) (Temporal)   Resp 16   Ht 5' 3\" (1.6 m)   Wt 149 lb (67.6 kg)   SpO2 98%   BMI 26.39 kg/m²     PHYSICAL EXAM:  General:  Awake, alert, oriented X 3. Well developed, well nourished, well groomed. No apparent distress. HEENT:  Normocephalic, atraumatic. Pupils equal, round, reactive to light. No scleral icterus. No conjunctival injection. Normal lips, teeth, and gums. No nasal discharge. Neck:  Supple, FROM  Heart:  RRR, no murmurs, gallops, rubs, carotid upstroke normal, no carotid bruits  Lungs:  CTA bilaterally, bilat symmetrical expansion, no wheeze, rales, or rhonchi  Abdomen:   Bowel sounds present, soft, nontender, no masses, no organomegaly, no peritoneal signs  Extremities:  No clubbing, cyanosis, or edema  Skin:  Warm and dry, no open lesions or rash  Neuro:  Cranial nerves 2-12 intact, no focal deficits Vascular: Radial and pedal Pulses 2+  Breast: deferred  Rectal: deferred  Genitalia:  deferred      DATA:     Recent Labs     01/24/21  1242 01/24/21  1814   WBC 2.8* 2.6*   HGB 8.3* 7.3*   PLT 50* 40*     Recent Labs     01/24/21  1242 01/24/21  1246     --    K 4.1 4.28   BUN 20  --    CREATININE 1.7*  --      Recent Labs     01/24/21  1242   PROT 5.4*   INR 1.7     Recent Labs     01/24/21  1242   AST 42*   ALT 24   ALKPHOS 160*   BILITOT 0.4     No results for input(s): BNP in the last 72 hours. Recent Labs     01/24/21  1242   TROPONINI <0.01       ASSESSMENT:      Principal Problem:    Failure to thrive in adult  Active Problems:    Stage IV breast cancer in female ([facility]    HTN (hypertension), benign    Diabetes mellitus type 2, uncontrolled ([facility]    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    CKD (chronic kidney disease) stage 3, GFR 30-59 ml/min    COPD (chronic obstructive pulmonary disease) (HCC)    Pancytopenia (HCC)    Lung mass    Pleural effusion    Acute hypoxemic respiratory failure (HCC)  Resolved Problems:    * No resolved hospital problems. *        PLAN:    Admit to general medical floor for evaluation of failure to thrive/hypoglycemia in patient undergoing chemotherapy for metastatic breast cancer now with bony mets as well as mets to the lungs    IV fluid hydration  Encourage p.o. intake  Discontinue sulfonylureas  Discontinue negative chronotropic agents  New to monitor blood sugars every 6 hours  ?  Unclear if the lung mets are new - I dont see it mentioned on previous notes     Questionable colitis on CT abdomen pelvis  Continue Rocephin and Flagyl for now  If patient remains afebrile without symptoms, antibiotics can be discontinued    Pancytopenia  Likely secondary to chemotherapy/immunocompromised host  Continue to monitor  Transfuse if hemoglobin less than 7  Platelet transfusion if platelet count drops below 15,000    Palliative care evaluation to delineate goals of care Hematology oncology evaluationunclear who patient sees as I do not see any recent notes on chart but patient is apparently actively undergoing chemotherapy    DVT prophylaxis  PT OT  Discharge plan    Heather Horvath MD  1/25/2021  9:11 AM

## 2021-01-25 NOTE — CONSULTS
 COPD (chronic obstructive pulmonary disease) ([facility] 6/27/2019    Diabetes mellitus ([facility]     GERD (gastroesophageal reflux disease)     Glaucoma     Hypertension     Neuropathy     Unspecified cerebral artery occlusion with cerebral infarction        Patient Active Problem List    Diagnosis Date Noted    Failure to thrive in adult 01/25/2021    Lung mass 01/24/2021    Pleural effusion 01/24/2021    Acute hypoxemic respiratory failure ([facility] 01/24/2021    [facility]topenia ([facility] 06/28/2019    Hypoxia 06/27/2019    COPD (chronic obstructive pulmonary disease) ([facility] 06/27/2019    HTN (hypertension), benign 11/29/2014    Diabetes mellitus type 2, uncontrolled ([facility] 11/29/2014    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 11/29/2014    Hypokalemia 11/29/2014    CKD (chronic kidney disease) stage 3, GFR 30-59 ml/min 11/29/2014    Acute diverticulitis 11/29/2014    Stage IV breast cancer in female [facility]) 39/82/6197    Diastolic dysfunction 78/57/5499        Past Surgical History:   Procedure Laterality Date    BREAST LUMPECTOMY      BREAST SURGERY      right partial mastectomy    CHOLECYSTECTOMY      COLONOSCOPY N/A 6/4/2013    DIVERTICULOSIS;POLYPS @15CM    ECHO COMPL W DOP COLOR FLOW  1/9/2013         EYE SURGERY      HYSTERECTOMY         Family History  Family History   Problem Relation Age of Onset    High Blood Pressure Mother     Stroke Mother     Early Death Father 61        liver disease    Heart Disease Father     High Blood Pressure Father     Asthma Sister     Cancer Brother     High Blood Pressure Brother     Stroke Brother        Social History    TOBACCO:   reports that she quit smoking about 23 years ago. She started smoking about 49 years ago. She has a 12.50 pack-year smoking history. She has never used smokeless tobacco.  ETOH:   reports no history of alcohol use.     Home Medications  Prior to Admission medications Medication Sig Start Date End Date Taking? Authorizing Provider   prednisoLONE acetate (PRED FORTE) 1 % ophthalmic suspension Place 1 drop into both eyes daily   Yes Historical Provider, MD   ketorolac (ACULAR) 0.5 % ophthalmic solution Place 1 drop into both eyes daily   Yes Historical Provider, MD   fluticasone-vilanterol (BREO ELLIPTA) 100-25 MCG/INH AEPB inhaler Inhale 1 puff into the lungs daily 12/16/20  Yes Angie Benoit MD   potassium chloride (MICRO-K) 10 MEQ extended release capsule Take 20 mEq by mouth 2 times daily   Yes Historical Provider, MD   albuterol (PROVENTIL) (2.5 MG/3ML) 0.083% nebulizer solution Take 3 mLs by nebulization every 6 hours as needed for Wheezing 6/28/19  Yes Angie Benoit MD   dexamethasone (DECADRON) 0.1 % ophthalmic solution 1 drop 3/7/19  Yes Historical Provider, MD   clobetasol prop emollient base 0.05 % CREA    Yes Historical Provider, MD   gabapentin (NEURONTIN) 400 MG capsule nightly 5/30/19  Yes Historical Provider, MD   ofloxacin (OCUFLOX) 0.3 % solution Place 1 drop into both eyes  3/7/19  Yes Historical Provider, MD   minocycline (MINOCIN;DYNACIN) 100 MG capsule Take 100 mg by mouth 2 times daily 12/18/12  Yes Historical Provider, MD   palbociclib (IBRANCE) 75 MG capsule 100 mg    Yes Historical Provider, MD   gabapentin (NEURONTIN) 300 MG capsule Take 300 mg by mouth 2 times daily. Yes Historical Provider, MD   albuterol (PROVENTIL HFA;VENTOLIN HFA) 108 (90 BASE) MCG/ACT inhaler Inhale 2 puffs into the lungs every 6 hours as needed for Wheezing. Yes Historical Provider, MD   Oyster Shell 500 MG TABS Take 500 mg by mouth daily. Yes Historical Provider, MD   verapamil (CALAN-SR) 180 MG CR tablet Take 180 mg by mouth 2 times daily. Yes Historical Provider, MD   Lansoprazole (PREVACID PO) Take 30 mg by mouth daily.    Yes Historical Provider, MD losartan-hydrochlorothiazide (HYZAAR) 50-12.5 MG per tablet Take 1 tablet by mouth daily. Yes Historical Provider, MD   Loratadine (CLARITIN) 10 MG CAPS Take 1 capsule by mouth daily. Yes Historical Provider, MD   glimepiride (AMARYL) 1 MG tablet Take 1 tablet by mouth 2 times daily (with meals) If eating  Patient taking differently: Take 1 mg by mouth daily If eating 6/28/19   Federico Kaye MD       Allergies  Allergies   Allergen Reactions    Alcohol     Eggs Or Egg-Derived Products     Rubbing Alcohol [Alc-Cynara Scolymus] Hives    Ethanol Rash     Patient gets blisters       Penicillins Rash    Sulfa Antibiotics Itching and Rash       Review of Systems: Fatigued weak malaise cold chills nausea decreased appetite. ? Constitutional:  No fever chills or rigors. ? Eyes: No changes in vision, discharge, or pain  ? ENT: No Headaches, hearing loss or vertigo. No mouth sores or sore throat. No change in taste or smell. ? Cardiovascular: No chest discomfort, dyspnea on exertion, palpitations or loss of consciousness. or phlebitis. ? Respiratory: Has no cough or wheezing, Has no sputum production. Has no hemoptysis, Has no pleuritic pain, .   ? Gastrointestinal: No abdominal pain, appetite loss, blood in stools. No change in bowel habits. No hematemesis   ? Genitourinary: Patient acknowledges no dysuria, trouble voiding, or hematuria. No nocturia or increased frequency. ? Musculoskeletal: No gait disturbance, weakness or joint complaints. ? Integumentary: No rash or pruritis. ? Neurological: No headache, diplopia, change in muscle strength, numbness or tingling. No change in gait, balance, coordination, mood, affect, memory, mentation, behavior. ? Psychiatric: No anxiety, or depression. ? Endocrine: No temperature intolerance. No excessive thirst, fluid intake, or urination. No tremor. ? Hematologic/Lymphatic: No abnormal bruising or bleeding, blood clots or swollen lymph nodes. ? Allergic/Immunologic: No nasal congestion or hives. Objective  /66   Pulse 67   Temp 98.2 °F (36.8 °C) (Temporal)   Resp 16   Ht 5' 3\" (1.6 m)   Wt 149 lb (67.6 kg)   SpO2 98%   BMI 26.39 kg/m²     Physical Exam:     General: AAO to person, place, time, in no acute distress,   Head and neck : PERRLA, EOMI . Sclera non icteric. Oropharynx : Clear  Neck: no JVD,  no adenopathy  Heart: Regular rate and regular rhythm, no murmur  Lungs: Diminished clear to auscultation   Extremities: No edema,no cyanosis, no clubbing. Abdomen: Soft, non-tender;no masses, no organomegaly  Skin:  Healing blister left leg covered. Neurologic:Cranial nerves grossly intact. No focal motor or sensory deficits .     Recent Laboratory Data-   Lab Results   Component Value Date    WBC 2.6 (L) 01/24/2021    HGB 7.3 (L) 01/24/2021    HCT 20.8 (L) 01/24/2021    MCV 92.9 01/24/2021    PLT 40 (L) 01/24/2021    LYMPHOPCT 8.8 (L) 01/24/2021    RBC 2.24 (L) 01/24/2021    MCH 32.6 01/24/2021    MCHC 35.1 (H) 01/24/2021    RDW 17.7 (H) 01/24/2021    NEUTOPHILPCT 85.1 (H) 01/24/2021    MONOPCT 2.6 01/24/2021    EOSPCT 7 (H) 10/13/2010    BASOPCT 0.9 01/24/2021    NEUTROABS 2.21 01/24/2021    LYMPHSABS 0.23 (L) 01/24/2021    MONOSABS 0.08 (L) 01/24/2021    EOSABS 0.07 01/24/2021    BASOSABS 0.02 01/24/2021       Lab Results   Component Value Date     01/24/2021    K 4.28 01/24/2021     01/24/2021    CO2 21 (L) 01/24/2021    BUN 20 01/24/2021    CREATININE 1.7 (H) 01/24/2021    GLUCOSE 150 (H) 01/24/2021    CALCIUM 7.8 (L) 01/24/2021    PROT 5.4 (L) 01/24/2021    LABALBU 2.1 (L) 01/24/2021    BILITOT 0.4 01/24/2021    ALKPHOS 160 (H) 01/24/2021    AST 42 (H) 01/24/2021    ALT 24 01/24/2021    LABGLOM 35 01/24/2021    GFRAA 35 01/24/2021       No results found for: IRON, TIBC, FERRITIN        Radiology-    Ct Abdomen Pelvis Wo Contrast Additional Contrast? None    Result Date: 1/24/2021 EXAMINATION: CT OF THE ABDOMEN AND PELVIS WITHOUT CONTRAST 1/24/2021 5:59 pm TECHNIQUE: CT of the abdomen and pelvis was performed without the administration of intravenous contrast. Multiplanar reformatted images are provided for review. Dose modulation, iterative reconstruction, and/or weight based adjustment of the mA/kV was utilized to reduce the radiation dose to as low as reasonably achievable. COMPARISON: Previous examination April 20, 2017 HISTORY: ORDERING SYSTEM PROVIDED HISTORY: Concern for colitis TECHNOLOGIST PROVIDED HISTORY: Reason for exam:->Concern for colitis Additional Contrast?->None Decision Support Exception->Emergency Medical Condition (MA) What reading provider will be dictating this exam?->CRC FINDINGS: The lung bases demonstrate patchy bibasilar infiltrates and pleural effusions more on the right side concerning for pneumonia or edema. Right mastectomy is noted. Liver is decreased in attenuation likely fatty infiltrated. Gallbladder is absent. Spleen, pancreas, the adrenals and the kidneys are normal.  There is collapsed stomach with wall thickening. Degenerative changes are identified in the  lumbar spine with the some developing sclerotic lesion on T10 concerning for bone metastasis. Pelvis. Bladder is partially distended with Nagy catheter. There is diffuse wall thickening and edema of the ascending colon and transverse colon. There is mild thickening of the left hemicolon with scattered diverticulosis. A large amount of ascites is present in the pelvis probably inflammatory. There is a decubitus ulcer in the left gluteus region. Diffuse wall thickening and edema of the ascending and transverse colon concerning for colitis. There is a large amount of inflammatory ascites in the pelvis. Atelectasis/infiltrates and pleural effusion lung bases likely pneumonia or edema. Collapsed stomach with wall thickening. Gastritis is a consideration. Possible decubitus ulcer in the left gluteus region and sclerotic lesion on T10 concerning for developing bone metastasis. Ct Head Wo Contrast    Result Date: 1/24/2021  EXAMINATION: CT OF THE HEAD WITHOUT CONTRAST  1/24/2021 4:16 pm TECHNIQUE: CT of the head was performed without the administration of intravenous contrast. Dose modulation, iterative reconstruction, and/or weight based adjustment of the mA/kV was utilized to reduce the radiation dose to as low as reasonably achievable. COMPARISON: CT head without contrast, August 15, 2018 HISTORY: ORDERING SYSTEM PROVIDED HISTORY: [facility] TECHNOLOGIST PROVIDED HISTORY: Has a \"code stroke\" or \"stroke alert\" been called? ->No Reason for exam:->ams What reading provider will be dictating this exam?->CRC FINDINGS: BRAIN/VENTRICLES: No mass effect, edema or hemorrhage is seen. Mild volume loss is seen in the cerebrum with mild chronic microvascular ischemic changes. No hydrocephalus or extra-axial fluid is seen. ORBITS: Prosthetic lenses are seen in the globes bilaterally. The orbits are otherwise grossly unremarkable. SINUSES: The visualized paranasal sinuses and mastoid air cells demonstrate no acute abnormality. SOFT TISSUES/SKULL:  No acute abnormality of the visualized skull or soft tissues. No acute intracranial abnormality.     Ct Chest Wo Contrast    Result Date: 1/24/2021 EXAMINATION: CT OF THE CHEST WITHOUT CONTRAST 1/24/2021 4:16 pm TECHNIQUE: CT of the chest was performed without the administration of intravenous contrast. Multiplanar reformatted images are provided for review. Dose modulation, iterative reconstruction, and/or weight based adjustment of the mA/kV was utilized to reduce the radiation dose to as low as reasonably achievable. COMPARISON: None. HISTORY: ORDERING SYSTEM PROVIDED HISTORY: [employer], TECHNOLOGIST PROVIDED HISTORY: Reason for exam:->Hypo[employer], What reading provider will be dictating this exam?->CRC FINDINGS: Lines and tubes:  None. Lungs and Airways and Pleura:  Central airways are patent. Moderate right pleural effusion with associated dependent atelectatic changes of the right lung base. No pneumothorax. There is a 22 mm spiculated nodule within the lateral aspect of the left upper lobe adjacent to the left major fissure extending into the pleural surface. The nodule also demonstrates central cavitation. There is moderate centrilobular emphysematous changes of the lungs. Lymph nodes: No pathologically enlarged mediastinal, hilar, lower cervical, or chest wall lymph nodes. Cardiovascular and Mediastinum:  Cardiac chamber sizes appear to measure within normal limits on this non ECG gated study. No pericardial effusion. Bones/Soft tissues:  No definite suspicious lytic or blastic foci. Upper abdomen: In the visualized part of upper abdomen there is suggestion of significant colonic wall thickening of hepatic flexure with extensive amount of surrounding fatty stranding highly concerning for an underlying colitis. Calcification within the spleen. Finding is likely sequela to prior granulomatous disease exposure. WHI Solution Her thrombocytopenia is related to myelosuppression by Ibrance    There is no clinical suggestion of DIC. Her moderately decreased fibrinogen and slightly prolonged PTT is related to liver disease hypoalbuminemia with an abnormal inseminations. To check iron studies, reticulocyte count, peripheral blood smear review B12 and folate and serum LDH. We will transfuse with 1 unit of packed RBC in view of her hypoxia and to improve oxygen carrying capacity.     We will discuss with family CT-guided biopsy of spiculated lung lesion to exclude a primary lung neoplasm    DEANNA Gore CNP  Electronically signed 1/25/2021 at 2:57 PM  Patient seen and examined, note edited to reflect above  Krystle Savage MD

## 2021-01-25 NOTE — SIGNIFICANT EVENT
Rapid Response Team Note  Date of event: 1/24/2021   Time of event: 2119  Faustino Distance 79y.o. year old female   YOB: 1940   Admit date:  1/24/2021   Location: [facility]   Witnessed? : [x]Yes  [] No  Monitored? : [x]Yes  [] No  Code status: [x] Full  [] DNR-CCA  []DNR-CC  ______________________________________________________________________  Reason for RRT:    [] RR < 8     [] RR > 28   [] SpO2 <90%   [x] HR < 40 bpm   [] HR > 130 bpm  [] SBP < 90 mmHg    [] SpO2 <90%   [] LOC   [] Seizures    [] Significant Bleeding Event    [x] Other: AMS    Subjective:   CTSP regarding the above event, patient has Hx metastatic breast CA that was admitted form the ED because of AMS. She is also getting Abx treatment for diverticulitis. Upon arrival, patient is altered, not responding or following commands. VS were significant for HR in the 30s and BG shown to be at 40. Patient was given an amp of D50 with response and improvement in MS. EKG was obtained to evaluate her bradychardia, showed junctional rhythm. BP and AV blocking meds were discontinued. Patient was given a litre of NS bolus with /61.     Objective:   Vital signs: /BP: 116/49 /RR: 12 /HR: 55  Initial Condition:  Conscious   [] Yes  [x] No     Breathing [x] Yes  [] No     Pulse  [x] Yes  [] No    Airway:   [x] Open/ Clear     Intervention: [x] None  [] Pooled secretions     [] Suctioned  [] Stridor      [] Intubation    Lungs:   [x] Symmetrical chest rise/ CTABL Intervention: [x] None  [] Use of accessory muscles    [] NIV (CPAP/BiPAP)  [] Cyanosis      [] Nasal Oxygen/Mask  [] Wheezing       [] ABG             [] CXR  [] Other:     Circulation:   Rhythm:  [] Sinus [x] Other: Junctional rythm  Intervention: [] None            [] IV Access  [] Peripheral              [] Central            [x] EKG            [] Cardioversion            [] Defibrillation     Capillary Refill:  [x] > 2 seconds [] < 2 seconds    Neurologic:   [] NIHSS [] Pupillary Response:    Response to pain:   [] Yes  [x] No  Follow commands:  [] Yes  [x] No  Facial asymmetry:  [] Yes  [x] No  Motor strength:  [x] Equal  [] Focal deficit:   Diagnostic Test:  Blood Sugar:  40 mg/dL  EKG:    Junctional rhythm, bradychardia  ABG:      Lab Results   Component Value Date    PH 7.390 01/24/2021    PCO2 33.3 01/24/2021    PO2 414.7 01/24/2021    HCO3 19.7 01/24/2021    O2SAT 99.3 01/24/2021     Medication(s) Given:  []  Narcan (Naloxone)   []  Romazicon (Flumazenil)    []  Breathing Treatment    []  Steroids (Prednisone, Solu-Medrol)  []  Adenosine  [] Cardiac Medicines:     Infusion(s):   [x] Normal Saline    Amount: 1litre      [] Lactate Ringers      [x] Other: D50 Amp    Imaging:   [] CXR:   [] Normal         [] Pneumothorax         [] Pulmonary Edema  [] Infiltrate          [] CT Head  [] Normal          [] ICB          [] SAH          [] Other: **    Laboratory Tests:     CBC with Differential:    Lab Results   Component Value Date    WBC 2.6 01/24/2021    RBC 2.24 01/24/2021    HGB 7.3 01/24/2021    HCT 20.8 01/24/2021    PLT 40 01/24/2021    MCV 92.9 01/24/2021    MCH 32.6 01/24/2021    MCHC 35.1 01/24/2021    RDW 17.7 01/24/2021    NRBC 4.4 01/24/2021    SEGSPCT 69 01/07/2014    BANDSPCT 1 12/02/2014    BLASTSPCT 0.9 10/10/2020    METASPCT 0.9 11/29/2018    LYMPHOPCT 8.8 01/24/2021    MONOPCT 2.6 01/24/2021    MYELOPCT 0.9 01/27/2020    EOSPCT 7 10/13/2010    BASOPCT 0.9 01/24/2021    MONOSABS 0.08 01/24/2021    LYMPHSABS 0.23 01/24/2021    EOSABS 0.07 01/24/2021    BASOSABS 0.02 01/24/2021     Hemoglobin/Hematocrit:    Lab Results   Component Value Date    HGB 7.3 01/24/2021    HCT 20.8 01/24/2021         Teams Assessment and Plan:    Acute encephalopathy   -2/2 hypoglycemia, TSH, Ammonia wnl   -BG 40, s/p D50 with improvement in MS  -Likely 2/2 hypoglycemic meds in the setting of YONG  -Currently on D51/2NS. Held insulin   ? ?  ?   Disposition:  [x] No transfer [] Transfer to monitor floor  [] Transfer to: [] MICU [] NICU [] CCU [] SICU    Patients family updated: [] Yes  [] No   Discussed with:  [] Critical Care Intensivist:         [] Primary Care Provider: Lacie Chavez MD      [] Other: Dr. Cb Portillo    Attempted to contact primary hospitalist on call, no answer, left a message.     Irma Johnson MD PGY-3  1/24/2021 10:44 PM  Attending Marcia Mccurdy MD

## 2021-01-25 NOTE — PROGRESS NOTES
Physical Therapy  PT SESSION ATTEMPT     Date:2021  Patient Name: Willwo Rachel  MRN: [id number]  : 1940  Room: [address] [place] [address]H     Attempted PT session this date:    [x] unavailable due to other medical staff currently with pt   [] on hold per nursing staff   [] on hold per nursing staff secondary to lab / radiology results    [] declined Physical Therapy this date. Benefits of participation in therapy reviewed with pt.    [] off unit   [] Other:   Pt and family member meeting with PA from palliative medicine at time of attempted eval.   Will reattempt PT at a later time as able.      Isaac Presser, PT, DPT  [id number]

## 2021-01-26 PROBLEM — I82.412 ACUTE DEEP VEIN THROMBOSIS (DVT) OF FEMORAL VEIN OF LEFT LOWER EXTREMITY (HCC): Status: ACTIVE | Noted: 2021-01-01

## 2021-01-26 PROBLEM — D69.6 THROMBOCYTOPENIA (HCC): Status: ACTIVE | Noted: 2021-01-01

## 2021-01-26 NOTE — PLAN OF CARE
Problem: Falls - Risk of:  Goal: Will remain free from falls  Description: Will remain free from falls  1/26/2021 0027 by Ashli Evangelista RN  Outcome: Met This Shift  1/25/2021 1131 by Adelaida London RN  Outcome: Met This Shift  Goal: Absence of physical injury  Description: Absence of physical injury  1/26/2021 0027 by Ashli Evangelista RN  Outcome: Met This Shift  1/25/2021 1131 by Adelaida London RN  Outcome: Met This Shift     Problem: Skin Integrity:  Goal: Will show no infection signs and symptoms  Description: Will show no infection signs and symptoms  1/26/2021 0027 by Ashli Evangelista RN  Outcome: Met This Shift  1/25/2021 1131 by Adelaida London RN  Outcome: Met This Shift  Goal: Absence of new skin breakdown  Description: Absence of new skin breakdown  1/26/2021 0027 by Ashli Evangelista RN  Outcome: Met This Shift  1/25/2021 1131 by Adelaida London RN  Outcome: Met This Shift

## 2021-01-26 NOTE — PROGRESS NOTES
[facility]  [address] [facility], [address]  [facility], [facility]      Pulmonary Progress Note      SUBJECTIVE:  Transferred to ICU due to hypoglycemia. Transfused for Anemia, lower ext dopplers showing left leg DVT so consideration for IVC filter tomorrow, hospice called just for info for family. Currently awake on 1 li NC, claims ok with breathing, denies cough/congestion, looking comfortable with breathing.     OBJECTIVE    Medications    Continuous Infusions:   sodium chloride      sodium chloride      dextrose      dextrose 75 mL/hr at 21 0433       Scheduled Meds:   ipratropium-albuterol  1 ampule Inhalation B0K WA    folic acid  1 mg Intravenous Daily    hydrocortisone sodium succinate PF  100 mg Intravenous Q8H    Mineral Oil-Hydrophil Petrolat   Topical BID    cefTRIAXone (ROCEPHIN) IV  1,000 mg Intravenous Q24H    metroNIDAZOLE  500 mg Intravenous Q8H    latanoprost  1 drop Left Eye QPM    brimonidine  1 drop Left Eye QAM    [Held by provider] gabapentin  300 mg Oral BID    [Held by provider] gabapentin  400 mg Oral Nightly    pantoprazole  40 mg Oral Daily    cetirizine  10 mg Oral Daily    [Held by provider] verapamil  180 mg Oral BID    budesonide  0.25 mg Nebulization BID    And    Arformoterol Tartrate  15 mcg Nebulization BID       PRN Meds:octreotide, sodium chloride, sodium chloride, albuterol, glucose, dextrose, glucagon (rDNA), dextrose    Physical    VITALS:  /75   Pulse 72   Temp 98.1 °F (36.7 °C) (Oral)   Resp 15   Ht 5' 3\" (1.6 m)   Wt 149 lb (67.6 kg)   SpO2 97%   BMI 26.39 kg/m²     24HR INTAKE/OUTPUT:      Intake/Output Summary (Last 24 hours) at 2021 1622  Last data filed at 2021 1200  Gross per 24 hour   Intake 1312 ml   Output 1160 ml   Net 152 ml       24HR PULSE OXIMETRY RANGE:    SpO2  Av.4 %  Min: 83 %  Max: 100 % General appearance: alert, appears stated age and cooperative  Lungs: rhonchi bibasilar minimal  Heart: regular rate and rhythm, S1, S2 normal, no murmur, click, rub or gallop  Abdomen: soft, non-tender; bowel sounds normal; no masses,  no organomegaly  Extremities: surgical dressing right lower leg  Neurologic: Mental status: Alert, oriented, thought content appropriate    Data    CBC:   Recent Labs     01/25/21  1621 01/25/21  1856 01/26/21  0459   WBC 2.1* 2.3* 2.4*   HGB 7.1* 7.1* 8.0*   HCT 19.8* 20.4* 23.6*   MCV 94.3 93.6 97.9   PLT 36* 37* 22*       BMP:  Recent Labs     01/24/21  1242 01/24/21  1246 01/26/21  0459 01/26/21  1256     --  125* 128*   K 4.1 4.28 3.5 4.2     --  96* 100   CO2 21*  --  19* 20*   PHOS 3.4  --   --   --    BUN 20  --  17 16   CREATININE 1.7*  --  1.8* 1.7*    ALB:3,BILIDIR:3,BILITOT:3,ALKPHOS:3)@    PT/INR:   Recent Labs     01/24/21  1242 01/26/21  0459   PROTIME 19.7* 20.6*   INR 1.7 1.8       ABG:   Recent Labs     01/26/21  0343   PH 7.409   PO2 349.7*   PCO2 28.6*   HCO3 17.7*   BE -6.0*   O2SAT 99.3*   METHB 0.4   O2HB 98.6*   COHB 0.3   O2CON 14.4   HHB 0.7   THB 9.7*     FiO2 : (S) 60 %       Radiology/Other tests reviewed:  none    Assessment:     Principal Problem:    Failure to thrive in adult  Active Problems:    Stage IV breast cancer in female (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 *      Plan:       1. For IR biopsy if family agrees, have to wait for thrombocytopenia to improve or gets platelet transfusions prior to procedure  2. Cont with oxygen, taper as tolerated  3.  Cont with nebs 4. Other issues as per MICU team       Time at the bedside, reviewing labs and radiographs, reviewing notes and consultations, discussing with staff and family was more than 35 minutes. Thanks for letting us see this patient in consultation. Please contact us with any questions. Office [phone] or after hours through [employer], x [phone].

## 2021-01-26 NOTE — PROGRESS NOTES
Patient continues to be Hypotensive and Hypoglycemic. In need of a unit of blood but is a hard stick and cannot stop dextrose to infuse blood at this time. Pt in need of central line and higher level of care.  Decision to transfer to [facility] made by  Patients daughter Samuel Jain made aware of her transfer and change in status

## 2021-01-26 NOTE — PLAN OF CARE
Problem: Falls - Risk of:  Goal: Will remain free from falls  Description: Will remain free from falls  1/26/2021 2510 by Andrew Emerson RN  Outcome: Met This Shift     Problem: Falls - Risk of:  Goal: Absence of physical injury  Description: Absence of physical injury  1/26/2021 1949 by Andrew Emerson RN  Outcome: Met This Shift     Problem: Skin Integrity:  Goal: Will show no infection signs and symptoms  Description: Will show no infection signs and symptoms  1/26/2021 0633 by Andrew Emerson RN  Outcome: Met This Shift

## 2021-01-26 NOTE — PROGRESS NOTES
Palliative Care Department  [phone]  Palliative Care Progress Note  Provider Lala Shearer  [id number]  Hospital Day: 3    Referring Provider: Maya Russell MD  Palliative Medicine was consulted for assistance with: Goals of care    CHIEF COMPLAINT: Mental status and hypoglycemia  Brief summary: 71-year-old female with metastatic breast cancer on oral chemotherapy. ASSESSMENT/PLAN:     Pertinent hospital diagnoses:  · Metastatic breast cancer  -Oncology consulted  -Ibrance, oral medication daily    · Thrombocytopenia/leukopenia  -Likely secondary to chemotherapy  -Trend labs    ·  Cavitary lung mass   -Pulmonology consulted    PALLIATIVE CARE ENCOUNTER  - Outcome of goals of care meeting:   -Consult hospice for information only  - Capacity: At this time, Blanche Kennedy, Does have capacity for medical decision-making. Capacity is time limited and situation/question specific  - Surrogate decision maker/Legal NOK:    -Yuniel Walter [phone]  - Code Status:   full  - Advanced directives: On chart  - Spiritual assessment: No needs identified  - Bereavement and grief: None identified    - DISPO: Continued treatment, hospice consult    Referrals to: Hospice    SUBJECTIVE:   Blanche Kennedy is a 79 y.o. with a past medical history of diabetes mellitus, GERD, COPD, metastatic breast cancer, hypertension, neuropathy, arthritis, glaucoma who presented to the emergency department from home with altered mental status and hypoglycemia.       Patient completed workup in the ED and was admitted on 1/24/2021 CT of chest revealed moderate centrilobular emphysema, 8 2.2 cm spiculated nodule with central cavitation and a moderate right pleural effusion. Additionally colonic thickening with surrounding fatty stranding is concerning for colitis. CT of the abdomen and pelvis revealed a large amount of ascites in the pelvis, decubitus ulcer in the left gluteus region and a sclerotic lesion on T10 concerning for developing bone metastasis. Was elevated to 1.7. Lactic acid elevated to 2.5. Albumin decreased to 2.1. RRT was called due to altered mental status and bradycardia patient was treated for hypoglycemia with improvement. White count was decreased overall to 2.8. H/H 8.3/23. 2. Platelets decreased at 50,000. Comparison to previous labs patient persistently leukopenic however platelet count has been variable. Patient is on Ibrance orally for metastatic breast cancer. DETAILS OF CONVERSATION:  Patient awake, complains of feeling cold. Denies pain. Discussed code status/intubation, and patient states she isn't really sure what to think, but for now wishes to remain full code. Patient asks if she is dying. Discussed new finding of mass in lung, and plan for ct guided biopsy, to which patient is agreeable. Discussed that cancer likely is life-limiting, patient wishes for more time.     OBJECTIVE:   Prognosis: Poor    Physical Exam:  /82   Pulse 83   Temp 97.6 °F (36.4 °C) (Oral)   Resp 15   Ht 5' 3\" (1.6 m)   Wt 149 lb (67.6 kg)   SpO2 97%   BMI 26.39 kg/m²   Gen:  Elderly, thin, wrapped in multiple blankets  HEENT:  Normocephalic, atraumatic, mucosa dry, sclera anicteric  Neck:  Supple, trachea midline  Lungs:  respirations unlabored  Heart[de-identified]  RRR, normal S1S2, no murmur/rub or gallop  Abd:  Soft, non tender, non distended, bowel sounds present, left upper quadrant scar  Ext:  Moving all extremities, muscular atrophy of lower extremities, no edema, pulses present Skin:  Xerotic skin, without rash, bruising, petechiae  Neuro: alert, oriented, able to carry on conversation     Objective data reviewed: labs, images, records, medication use, vitals and chart  Relevant data: Per HPI    Time/Communication  Greater than 50% of time spent, total 15 minutes in counseling and coordination of care at the bedside/over the telephone regarding goals of care, symptom management, diagnosis and prognosis and see above. Thank you for allowing Palliative Medicine to participate in the care of Walter Serrano.       Provider [facility]

## 2021-01-26 NOTE — PROGRESS NOTES
Just received hospice consult- consult for information only. Chart reviewed. Call placed to daughter Juana Grimes, no answer, left message, will await call back. I will follow up if I do not hear back from [facility].

## 2021-01-26 NOTE — PROGRESS NOTES
Physical Therapy    Physical Therapy Initial Assessment     Name: Gera Rodas  : 1940  MRN: [id number]    Referring Provider:  Theodore Aburto MD    Date of Service: 2021    Evaluating PT:  Carol Mejia, PT, DPT [id number]     Room #:  8210/8812-G  Diagnosis:  Lung mass   PMHx/PSHx:  Arthritis, breast CA, COPD, DM,  GERD, glaucoma, HTN, neuropathy, CVA  Precautions:  Falls, O2, Legally blind  Equipment Needs:  TBD    SUBJECTIVE:    Pt lives with dtr in a 1 story home with 1+1 stairs to enter and 1 rail. Bedroom and bathroom are on the 1st level. Pt ambulated with Rollator PTA. OBJECTIVE:   Initial Evaluation  Date: 21 Treatment Short Term/ Long Term   Goals   AM-PAC 6 Clicks      Was pt agreeable to Eval/treatment? Yes     Does pt have pain? No c/o pain      Bed Mobility  Rolling: Max A  Supine to sit: Max A x2  Sit to supine: Max A x2  Scooting: Dep x2  Rolling: Mod A  Supine to sit: Mod A  Sit to supine: Mod A  Scooting: Mod A   Transfers Sit to stand: NT  Stand to sit: NT  Stand pivot: NT  Sit to stand: Mod A  Stand to sit: Mod A  Stand pivot: Mod A with [employer]   Ambulation    NT  >5 feet with [employer] Max A   Stair negotiation: ascended and descended  NT  NA   ROM BUE:  Per OT eval  BLE:  WFL     Strength BUE:  Per OT eval   BLE:  Grossly 3/5     Balance Sitting EOB:  SBA static; Min A dynamic   Dynamic Standing:  NT  Sitting EOB:  SBA  Dynamic Standing: Mod A     Pt is A & O x 3  RASS:  -1  CAM-ICU:  NT  Sensation:  Pt denies numbness and tingling to extremities  Edema:  Unremarkable     Vitals:  Blood Pressure at rest 123/75 Blood Pressure post session 128/73   Heart Rate at rest 78 bpm Heart Rate post session 78 bpm   SPO2 at rest 96% SPO2 post session 99%   /89  Questionable bradycardia at EOB d/t poor reading on monitor     Therapeutic Exercises:    ?  BLE ROM in supine and EOB    Patient education  Pt educated on safety during functional mobility Patient response to education:   Pt verbalized understanding Pt demonstrated skill Pt requires further education in this area   Somewhat  Somewhat  Yes     ASSESSMENT:    Comments:  Pt received supine and agreeable to PT evaluation with OT collaboration. Pt cleared for participation by RN prior to session. Vitals monitored during session. Pt demonstrates lethargy and gross weakness. Requires assistance of LE and trunk to EOB. Performed LE ROM at EOB and in supine. Assisted back to bed d/t poor participation, pt requesting d/t being cold, and questionable bradycardia on monitor. Assisted back and scooted up and re-positioned in bed. Pt left with call button in reach, lines attached, and needs met. Treatment:  Patient practiced and was instructed in the following treatment:    ? Bed mobility training - pt given verbal and tactile cues to facilitate proper sequencing and safety during rolling and supine<>sit as well as provided with physical assistance to complete task    ? Sitting EOB for >8 minutes for upright tolerance, postural awareness and BLE ROM   ? Skilled positioning - Pt placed in the chair position with pillows utilized to facilitate upright posture, joint and skin integrity, and interaction with environment. Pt's/ family goals   1. None stated     Patient and or family understand(s) diagnosis, prognosis, and plan of care. ? PLAN:    Current Treatment Recommendations     [x] Strengthening     [] ROM   [x] Balance Training   [x] Endurance Training   [x] Transfer Training   [x] Gait Training   [] Stair Training   [x] Positioning   [x] Safety and Education Training   [x] Patient/Caregiver Education   [] HEP  [] Other     Frequency of treatments: 2-5x/week x 1-2 weeks.     Time in  1400  Time out  1430    Total Treatment Time  25 minutes Evaluation Time includes thorough review of current medical information, gathering information on past medical history/social history and prior level of function, completion of standardized testing/informal observation of tasks, assessment of data and education on plan of care and goals.     CPT codes:  [] Low Complexity PT evaluation 13127  [x] Moderate Complexity PT evaluation 24995  [] High Complexity PT evaluation 39644  [] PT Re-evaluation 87182  [] Gait training 24860 -- minutes  [] Manual therapy 40245 -- minutes  [x] Therapeutic activities 62397 25 minutes  [] Therapeutic exercises 95030 -- minutes  [] Neuromuscular reeducation 94625 -- minutes     Bernice Velez, PT, DPT  [id number]

## 2021-01-26 NOTE — PROGRESS NOTES
Occupational Therapy  Occupational Therapy Initial Evaluation   Date:2021  Patient Name: Alissa Martins  MRN: [id number]  : 1940  Room: [address] [place] [address]A    Referring Provider: Coni Ceja MD    Evaluating OT: Magda Osullivan OTR/L #[id number]    AM-PAC Daily Activity Raw Score:     Recommended Adaptive Equipment: TBD     Diagnosis: Lung mass [R91.8]    Reason for admission: hypoglycemia    Surgery/Procedure:  none    Pertinent Medical History: arthritis, cancer, COPD, DM, GERD, legally blind, HTN, neuropathy, HTN, CVA     Precautions:  Falls, legally blind, O2, bradycardic (monitor HR)    RRT  for AMS and HR in 30s. Home Living: Pt lives with daugther  in a 1 story house with 1+1 step(s) to enter and 0 rail(s); bed/bath on 1st floor  Bathroom setup: tub/shower w/ shower chair  Equipment owned: shower chair, rollator    Prior Level of Function: Assistance prn per daughter report with ADLs; Assistance with IADLs. rollator for ambulation. No one is there to assist/supervise . Aides come in Tues/Thurs to assist with IADLs/bathing  Driving: No    Pain Level: pt c/o 0/10 pain  this session      Cognition: A&O: 3/4  Follows 1-2 step commands. Pt intermittently lethargic during session. Increased cueing for initiation of task. Memory: fair   Comprehension fair   Problem solving: fair-   Judgement/safety: fair-               Communication skills:            Vision: legally blind (can see movements)               Glasses: no                                                  Hearing: wfl     RASS: 0 to -1  CAM-ICU: (NT) Delirium    UE Assessment:  Hand Dominance: Right [x]  Left []     ROM Strength STM goal: PRN   RUE  Grossly WFL 4-/5 4+/5     LUE NT d/t IV fragile in LUE, RN made therapist aware to be careful.  For safety did not test.  NT        Sensation: No c/o numbness or tingling in extremities   Tone: WNL   Edema: none noted     Functional Assessment   Initial Eval Status HR decreased to 30-40s sitting EOB (unsure of accuracy d/t pt reporting no signs of distress)- for safety assisted pt back to supine and HR increased to 80s. Treatment:   · Bed mobility: Facilitated bed mobility with cues for proper body mechanics and sequencing to prepare for ADL completion. Assist of 2 for safety d/t pt's medical complexity and deconditioning. · ADL completion: Self-care retraining for the above-mentioned ADLs; training on proper hand placement, safety technique, sequencing, and energy conservation techniques. · Therapeutic Exercises: R UE AROM completed at all levels to maintain strength/flexibility and to decrease edema/contractures to enhance ADL completion. LUE not completed d/t fragile IV line. · Postural Balance: Sitting balance retraining to improve righting reactions with postural changes during ADLS. · Cognitive/Perceptual training: retraining exercises to improve attention, mentation, and spatial awareness for ADLs & transfers. · Skilled positioning: Proper positioning to improve interaction with environment, overall functioning and decrease/prevent edema and contractures. · Delirium Prevention/Management: Implementation of non-pharmacologic interventions for delirium prevention incorporated throughout session to patient. Including environmental and sensory modifications to decrease patient's internal distraction caused by delirium, and improve overall mentation. Comments: OK from RN to see patient. Upon arrival, patient lying in bed. Pt demo fair tolerance with fair understanding of education/techniques. At end of session, patient lying in bed (re-positioned). Call light within reach, all lines and tubes intact. Pt instructed on use of call light for assistance and fall prevention. Line management and environmental modifications made prior to and end of session to ensure patient safety and to increase efficiency of session. Skilled monitoring of HR, O2 saturation, blood pressure and patient's response to activity performed throughout session. Overall, pt presents with decreased activity tolerance, dynamic balance, functional mobility limiting completion of ADLs and safe return home. Pt can benefit from continued skilled OT to increase safety and functional independence. RN aware of pt's status and performance. Evaluation Complexity: Mod    · History: Expanded chart review of consults, imaging, and psychosocial history related to current functional performance. · Exam: 5+ performance deficits identified limiting functional independence and safe return home   · Assistance/Modification: Min/mod assistance or modifications required to perform tasks. May have comorbidities that affect occupational performance.     Assessment of current deficits   Functional mobility [x]  ADLs [x] Strength [x]  Cognition [x]  Functional transfers  [x] IADLs [x] Safety Awareness [x]  Endurance [x]  Fine Motor Coordination [x] Balance [x] Vision/perception [x] Sensation [x]   Gross Motor Coordination [] ROM [x] Delirium []                  Communication []    Plan of Care: 1-3 days/week for 1-2 weeks PRN   [x]ADL retraining/adapted techniques and AE recommendations to increase functional independence within precautions                    [x]Energy conservation techniques to improve tolerance for selfcare routine [x]Functional transfer/mobility training/DME recommendations for increased independence, safety and fall prevention         [x]Patient/family education to increase safety and functional independence             [x]Environmental modifications for safe mobility and completion of ADLs                             [x]Cognitive retraining ex's to improve problem solving skills & safe participation in ADLs/IADLs     [x]Sensory re-education techniques to improve extremity awareness, maintain skin integrity and improve hand function                             [x]Visual/Perceptual retraining  to improve body awareness and safety during transfers and ADLs  []Splinting/positioning needs to maintain joint/skin integrity and prevent contractures  [x]Therapeutic activity to improve functional performance during ADLs. [x]Therapeutic exercise to improve tolerance and functional strength for ADLs   [x]Balance retraining/tolerance tasks for facilitation of postural control with dynamic challenges during ADLs . [x]Neuromuscular re-education: facilitation of righting/equilibrium reactions, midline orientation, scapular stability/mobility, Normalization muscle     tone and facilitation active functional movement/Attention                         []Delirium prevention/treatment    [x]Positioning to improve functional independence  []Other:       Rehab Potential:  Good for established goals     Patient / Family Goal: not stated      Patient and/or family were instructed on functional diagnosis, prognosis/goals and OT plan of care. Demonstrated fair understanding.       Time In: 2:00  Time Out: 2:30  Total Treatment Time: 23 minutes    Min Units   OT Eval Low 33925       OT Eval Medium 97166 x 1   OT Eval High 99795       OT Re-Eval 19100       Therapeutic Ex 01505       Therapeutic Activities 87507  15  1   ADL/Self Care 56307  8  1   Orthotic Management 66048       Neuro Re-Ed 95447 Non-Billable Time          Evaluation Time includes thorough review of current medical information, gathering information on past medical history/social history and prior level of function, completion of standardized testing/informal observation of tasks, assessment of data and education on plan of care and goals.     Jerrye Duverney, OTR/L #[id number]

## 2021-01-26 NOTE — PROGRESS NOTES
 Cancer ([facility]     breast    COPD (chronic obstructive pulmonary disease) ([facility] 6/27/2019    Diabetes mellitus ([facility]     GERD (gastroesophageal reflux disease)     Glaucoma     Hypertension     Neuropathy     Unspecified cerebral artery occlusion with cerebral infarction        Patient Active Problem List    Diagnosis Date Noted    Failure to thrive in adult 01/25/2021    Lung mass 01/24/2021    Pleural effusion 01/24/2021    Acute hypoxemic respiratory failure ([address] 01/24/2021    Pancytopenia ([facility] 06/28/2019    Hypoxia 06/27/2019    COPD (chronic obstructive pulmonary disease) ([facility] 06/27/2019    HTN (hypertension), benign 11/29/2014    Diabetes mellitus type 2, uncontrolled ([facility] 11/29/2014    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 11/29/2014    Hypokalemia 11/29/2014    CKD (chronic kidney disease) stage 3, GFR 30-59 ml/min 11/29/2014    Acute diverticulitis 11/29/2014    Stage IV breast cancer in female [facility]) 03/76/6926    Diastolic dysfunction 64/37/6895        Past Surgical History:   Procedure Laterality Date    BREAST LUMPECTOMY      BREAST SURGERY      right partial mastectomy    CHOLECYSTECTOMY      COLONOSCOPY N/A 6/4/2013    DIVERTICULOSIS;POLYPS @15CM    ECHO COMPL W DOP COLOR FLOW  1/9/2013         EYE SURGERY      HYSTERECTOMY         Family History  Family History   Problem Relation Age of Onset    High Blood Pressure Mother     Stroke Mother     Early Death Father 61        liver disease    Heart Disease Father     High Blood Pressure Father     Asthma Sister     Cancer Brother     High Blood Pressure Brother     Stroke Brother        Social History    TOBACCO:   reports that she quit smoking about 23 years ago. She started smoking about 49 years ago. She has a 12.50 pack-year smoking history. She has never used smokeless tobacco.  ETOH:   reports no history of alcohol use.     Home Medications  Prior to Admission medications Medication Sig Start Date End Date Taking? Authorizing Provider   prednisoLONE acetate (PRED FORTE) 1 % ophthalmic suspension Place 1 drop into both eyes daily   Yes Historical Provider, MD   ketorolac (ACULAR) 0.5 % ophthalmic solution Place 1 drop into both eyes daily   Yes Historical Provider, MD   fluticasone-vilanterol (BREO ELLIPTA) 100-25 MCG/INH AEPB inhaler Inhale 1 puff into the lungs daily 12/16/20  Yes Lyla Angelucci, MD   potassium chloride (MICRO-K) 10 MEQ extended release capsule Take 20 mEq by mouth 2 times daily   Yes Historical Provider, MD   albuterol (PROVENTIL) (2.5 MG/3ML) 0.083% nebulizer solution Take 3 mLs by nebulization every 6 hours as needed for Wheezing 6/28/19  Yes Lyla Angelucci, MD   dexamethasone (DECADRON) 0.1 % ophthalmic solution 1 drop 3/7/19  Yes Historical Provider, MD   clobetasol prop emollient base 0.05 % CREA    Yes Historical Provider, MD   gabapentin (NEURONTIN) 400 MG capsule nightly 5/30/19  Yes Historical Provider, MD   ofloxacin (OCUFLOX) 0.3 % solution Place 1 drop into both eyes  3/7/19  Yes Historical Provider, MD   minocycline (MINOCIN;DYNACIN) 100 MG capsule Take 100 mg by mouth 2 times daily 12/18/12  Yes Historical Provider, MD   palbociclib (IBRANCE) 75 MG capsule 100 mg    Yes Historical Provider, MD   gabapentin (NEURONTIN) 300 MG capsule Take 300 mg by mouth 2 times daily. Yes Historical Provider, MD   albuterol (PROVENTIL HFA;VENTOLIN HFA) 108 (90 BASE) MCG/ACT inhaler Inhale 2 puffs into the lungs every 6 hours as needed for Wheezing. Yes Historical Provider, MD   Oyster Shell 500 MG TABS Take 500 mg by mouth daily. Yes Historical Provider, MD   verapamil (CALAN-SR) 180 MG CR tablet Take 180 mg by mouth 2 times daily. Yes Historical Provider, MD   Lansoprazole (PREVACID PO) Take 30 mg by mouth daily.    Yes Historical Provider, MD losartan-hydrochlorothiazide (HYZAAR) 50-12.5 MG per tablet Take 1 tablet by mouth daily. Yes Historical Provider, MD   Loratadine (CLARITIN) 10 MG CAPS Take 1 capsule by mouth daily. Yes Historical Provider, MD   glimepiride (AMARYL) 1 MG tablet Take 1 tablet by mouth 2 times daily (with meals) If eating  Patient taking differently: Take 1 mg by mouth daily If eating 6/28/19   Tasneem Hernandez MD       Allergies  Allergies   Allergen Reactions    Alcohol     Eggs Or Egg-Derived Products     Rubbing Alcohol [Alc-Cynara Scolymus] Hives    Ethanol Rash     Patient gets blisters       Penicillins Rash    Sulfa Antibiotics Itching and Rash       Review of Systems: Fatigued weak malaise cold chills nausea decreased appetite. ? Constitutional:  No fever chills or rigors. ? Eyes: No changes in vision, discharge, or pain  ? ENT: No Headaches, hearing loss or vertigo. No mouth sores or sore throat. No change in taste or smell. ? Cardiovascular: No chest discomfort, dyspnea on exertion, palpitations or loss of consciousness. or phlebitis. ? Respiratory: Has no cough or wheezing, Has no sputum production. Has no hemoptysis, Has no pleuritic pain, .   ? Gastrointestinal: No abdominal pain, appetite loss, blood in stools. No change in bowel habits. No hematemesis   ? Genitourinary: Patient acknowledges no dysuria, trouble voiding, or hematuria. No nocturia or increased frequency. ? Musculoskeletal: No gait disturbance, weakness or joint complaints. ? Integumentary: No rash or pruritis. ? Neurological: No headache, diplopia, change in muscle strength, numbness or tingling. No change in gait, balance, coordination, mood, affect, memory, mentation, behavior. ? Psychiatric: No anxiety, or depression. ? Endocrine: No temperature intolerance. No excessive thirst, fluid intake, or urination. No tremor. ? Hematologic/Lymphatic: No abnormal bruising or bleeding, blood clots or swollen lymph nodes. ? Allergic/Immunologic: No nasal congestion or hives. Objective  /75   Pulse 72   Temp 98.1 °F (36.7 °C) (Oral)   Resp 15   Ht 5' 3\" (1.6 m)   Wt 149 lb (67.6 kg)   SpO2 97%   BMI 26.39 kg/m²     Physical Exam:     General: AAO to person, place, time, in no acute distress,   Head and neck : PERRLA, EOMI . Sclera non icteric. Oropharynx : Clear  Neck: no JVD,  no adenopathy  Heart: Regular rate and regular rhythm, no murmur  Lungs: Diminished clear to auscultation   Extremities: No edema,no cyanosis, no clubbing. Abdomen: Soft, non-tender;no masses, no organomegaly  Skin:  Healing blister left leg covered. Neurologic:Cranial nerves grossly intact. No focal motor or sensory deficits .     Recent Laboratory Data-   Lab Results   Component Value Date    WBC 2.4 (L) 01/26/2021    HGB 8.0 (L) 01/26/2021    HCT 23.6 (L) 01/26/2021    MCV 97.9 01/26/2021    PLT 22 (L) 01/26/2021    LYMPHOPCT 9.6 (L) 01/26/2021    RBC 2.41 (L) 01/26/2021    MCH 33.2 01/26/2021    MCHC 33.9 01/26/2021    RDW 17.4 (H) 01/26/2021    NEUTOPHILPCT 82.5 (H) 01/26/2021    MONOPCT 4.4 01/26/2021    EOSPCT 7 (H) 10/13/2010    BASOPCT 1.8 01/26/2021    NEUTROABS 1.99 01/26/2021    LYMPHSABS 0.24 (L) 01/26/2021    MONOSABS 0.10 01/26/2021    EOSABS 0.04 (L) 01/26/2021    BASOSABS 0.04 01/26/2021       Lab Results   Component Value Date     (L) 01/26/2021    K 4.2 01/26/2021     01/26/2021    CO2 20 (L) 01/26/2021    BUN 16 01/26/2021    CREATININE 1.7 (H) 01/26/2021    GLUCOSE 160 (H) 01/26/2021    CALCIUM 6.6 (L) 01/26/2021    PROT 4.4 (L) 01/26/2021    LABALBU 1.7 (L) 01/26/2021    BILITOT 0.4 01/26/2021    ALKPHOS 117 (H) 01/26/2021    AST 50 (H) 01/26/2021    ALT 23 01/26/2021    LABGLOM 35 01/26/2021    GFRAA 35 01/26/2021       Lab Results   Component Value Date    IRON 105 01/25/2021    TIBC 94 (L) 01/25/2021    FERRITIN 296 01/25/2021           Radiology- Ct Abdomen Pelvis Wo Contrast Additional Contrast? None    Result Date: 1/24/2021  EXAMINATION: CT OF THE ABDOMEN AND PELVIS WITHOUT CONTRAST 1/24/2021 5:59 pm TECHNIQUE: CT of the abdomen and pelvis was performed without the administration of intravenous contrast. Multiplanar reformatted images are provided for review. Dose modulation, iterative reconstruction, and/or weight based adjustment of the mA/kV was utilized to reduce the radiation dose to as low as reasonably achievable. COMPARISON: Previous examination April 20, 2017 HISTORY: ORDERING SYSTEM PROVIDED HISTORY: Concern for colitis TECHNOLOGIST PROVIDED HISTORY: Reason for exam:->Concern for colitis Additional Contrast?->None Decision Support Exception->Emergency Medical Condition (MA) What reading provider will be dictating this exam?->CRC FINDINGS: The lung bases demonstrate patchy bibasilar infiltrates and pleural effusions more on the right side concerning for pneumonia or edema. Right mastectomy is noted. Liver is decreased in attenuation likely fatty infiltrated. Gallbladder is absent. Spleen, pancreas, the adrenals and the kidneys are normal.  There is collapsed stomach with wall thickening. Degenerative changes are identified in the  lumbar spine with the some developing sclerotic lesion on T10 concerning for bone metastasis. Pelvis. Bladder is partially distended with Nagy catheter. There is diffuse wall thickening and edema of the ascending colon and transverse colon. There is mild thickening of the left hemicolon with scattered diverticulosis. A large amount of ascites is present in the pelvis probably inflammatory. There is a decubitus ulcer in the left gluteus region. Diffuse wall thickening and edema of the ascending and transverse colon concerning for colitis. There is a large amount of inflammatory ascites in the pelvis. Atelectasis/infiltrates and pleural effusion lung bases likely pneumonia or edema. Collapsed stomach with wall thickening. Gastritis is a consideration. Possible decubitus ulcer in the left gluteus region and sclerotic lesion on T10 concerning for developing bone metastasis. Ct Head Wo Contrast    Result Date: 1/24/2021  EXAMINATION: CT OF THE HEAD WITHOUT CONTRAST  1/24/2021 4:16 pm TECHNIQUE: CT of the head was performed without the administration of intravenous contrast. Dose modulation, iterative reconstruction, and/or weight based adjustment of the mA/kV was utilized to reduce the radiation dose to as low as reasonably achievable. COMPARISON: CT head without contrast, August 15, 2018 HISTORY: ORDERING SYSTEM PROVIDED HISTORY: [facility] TECHNOLOGIST PROVIDED HISTORY: Has a \"code stroke\" or \"stroke alert\" been called? ->No Reason for exam:->ams What reading provider will be dictating this exam?->CRC FINDINGS: BRAIN/VENTRICLES: No mass effect, edema or hemorrhage is seen. Mild volume loss is seen in the cerebrum with mild chronic microvascular ischemic changes. No hydrocephalus or extra-axial fluid is seen. ORBITS: Prosthetic lenses are seen in the globes bilaterally. The orbits are otherwise grossly unremarkable. SINUSES: The visualized paranasal sinuses and mastoid air cells demonstrate no acute abnormality. SOFT TISSUES/SKULL:  No acute abnormality of the visualized skull or soft tissues. No acute intracranial abnormality.     Ct Chest Wo Contrast    Result Date: 1/24/2021 EXAMINATION: CT OF THE CHEST WITHOUT CONTRAST 1/24/2021 4:16 pm TECHNIQUE: CT of the chest was performed without the administration of intravenous contrast. Multiplanar reformatted images are provided for review. Dose modulation, iterative reconstruction, and/or weight based adjustment of the mA/kV was utilized to reduce the radiation dose to as low as reasonably achievable. COMPARISON: None. HISTORY: ORDERING SYSTEM PROVIDED HISTORY: [employer], TECHNOLOGIST PROVIDED HISTORY: Reason for exam:->Hypoixa, What reading provider will be dictating this exam?->CRC FINDINGS: Lines and tubes:  None. Lungs and Airways and Pleura:  Central airways are patent. Moderate right pleural effusion with associated dependent atelectatic changes of the right lung base. No pneumothorax. There is a 22 mm spiculated nodule within the lateral aspect of the left upper lobe adjacent to the left major fissure extending into the pleural surface. The nodule also demonstrates central cavitation. There is moderate centrilobular emphysematous changes of the lungs. Lymph nodes: No pathologically enlarged mediastinal, hilar, lower cervical, or chest wall lymph nodes. Cardiovascular and Mediastinum:  Cardiac chamber sizes appear to measure within normal limits on this non ECG gated study. No pericardial effusion. Bones/Soft tissues:  No definite suspicious lytic or blastic foci. Upper abdomen: In the visualized part of upper abdomen there is suggestion of significant colonic wall thickening of hepatic flexure with extensive amount of surrounding fatty stranding highly concerning for an underlying colitis. Calcification within the spleen. Finding is likely sequela to prior granulomatous disease exposure. Nelsy Snyder 22 mm spiculated nodule within the lateral aspect of the left upper lobe with central cavitation. The lesion is concerning for underlying lung malignancy. Considering the size and appearance either tissue sampling or PET-CT examination can be performed Moderate centrilobular emphysematous changes of the lungs. Moderate right pleural effusion and atelectatic changes of right lung base. Significant colonic wall thickening with surrounding fatty stranding is highly concerning for colitis. For further evaluation CT of the abdomen can be obtained. Xr Chest Portable    Result Date: 1/24/2021  EXAMINATION: ONE XRAY VIEW OF THE CHEST 1/24/2021 1:23 pm COMPARISON: August 15, 2019 HISTORY: ORDERING SYSTEM PROVIDED HISTORY: Hypoglycemia, hypoxia TECHNOLOGIST PROVIDED HISTORY: Reason for exam:->Hypoglycemia, hypoxia What reading provider will be dictating this exam?->CRC FINDINGS: Heart and the mediastinal structures are normal.  There is COPD with atelectasis in the lung bases. degenerative changes are noted in the shoulders. Stable nonacute chest with atelectasis in the lung bases. ASSESSMENT/PLAN :    23-year-old female    GERD, COPD, HTN, neuropathy, and diabetes. Stage IV breast cancer with skeletal metastasis, ER/SD positive and HER-2/donald negative. Status post a right mastectomy and is currently on Ibrance/Faslodex and Xgeva. She received cycle 7 of Faslodex on 1/5. CTA chest showed 22 mm spiculated nodule within the lateral aspect of the left upper lobe with central cavitation. Centrilobular emphysema also noted  CBC showed for pancytopenia. Fibrinogen 97.   CT A/P showed sclerotic lesion on T10 concerning for developing bone metastasis. Significant colonic wall thickening suspicious for colitis. Her pancytopenia is related to myelosuppression by Ibrance  Her anemia is also contributed to by chronic kidney disease. Her leukopenia is predominantly related to lymphopenia. Her thrombocytopenia is related to myelosuppression by Ibrance    There is no clinical suggestion of DIC. Her moderately decreased fibrinogen and slightly prolonged PTT is related to liver disease hypoalbuminemia with an abnormal inseminations. To check iron studies, reticulocyte count, peripheral blood smear review B12 and folate and serum LDH. We will transfuse with 1 unit of packed RBC in view of her hypoxia and to improve oxygen carrying capacity. We will discuss with family CT-guided biopsy of spiculated lung lesion to exclude a primary lung neoplasm    1/26/21  - Hospice consulted for information only  - Hgb improved s/p transfusion and WBC stable, further fall in platelet count to 22  - Iron profile reviewed.  Adequate  - On folic acid  - If patient wishes to proceed with lung biopsy, likely need to transfuse 2 unit platelets prior to procedure (should finish 1 hour prior to procedure)  - Plans for IVCF 1/27 due to thrombocytopenia and LLE DVT    Rocio Street MD  Electronically signed 1/26/2021 at 3:43 PM

## 2021-01-26 NOTE — PLAN OF CARE
Problem: Falls - Risk of:  Goal: Will remain free from falls  Description: Will remain free from falls  1/26/2021 1029 by Morena Rhodes RN  Outcome: Met This Shift  1/26/2021 0633 by Justin Williamson RN  Outcome: Met This Shift  1/26/2021 0027 by Desirae Morales RN  Outcome: Met This Shift  Goal: Absence of physical injury  Description: Absence of physical injury  1/26/2021 1029 by Morena hRodes RN  Outcome: Met This Shift  1/26/2021 0633 by Justin Williamson RN  Outcome: Met This Shift  1/26/2021 0027 by Desirae Morales RN  Outcome: Met This Shift     Problem: Skin Integrity:  Goal: Will show no infection signs and symptoms  Description: Will show no infection signs and symptoms  1/26/2021 1029 by Morena Rhodes RN  Outcome: Met This Shift  1/26/2021 0633 by Justin Williamson RN  Outcome: Met This Shift  1/26/2021 0027 by Desirae Morales RN  Outcome: Met This Shift  Goal: Absence of new skin breakdown  Description: Absence of new skin breakdown  1/26/2021 1029 by Morena Rhodes RN  Outcome: Met This Shift  1/26/2021 0027 by Desirae Morales RN  Outcome: Met This Shift

## 2021-01-26 NOTE — PLAN OF CARE
Problem: Falls - Risk of:  Goal: Will remain free from falls  Description: Will remain free from falls  1/26/2021 1412 by Vinita Daniels RN  Outcome: Met This Shift  1/26/2021 1029 by Vinita Daniels RN  Outcome: Met This Shift  1/26/2021 0633 by Rajat Herrera RN  Outcome: Met This Shift  1/26/2021 0027 by Linda Alamo RN  Outcome: Met This Shift  Goal: Absence of physical injury  Description: Absence of physical injury  1/26/2021 1412 by Vinita Daniels RN  Outcome: Met This Shift  1/26/2021 1029 by Vinita Daniels RN  Outcome: Met This Shift  1/26/2021 0633 by Rajat Herrera RN  Outcome: Met This Shift  1/26/2021 0027 by Linda Alamo RN  Outcome: Met This Shift     Problem: Skin Integrity:  Goal: Will show no infection signs and symptoms  Description: Will show no infection signs and symptoms  1/26/2021 1412 by Vinita Daniels RN  Outcome: Met This Shift  1/26/2021 1029 by Vinita Daniels RN  Outcome: Met This Shift  1/26/2021 0633 by Rajat Herrera RN  Outcome: Met This Shift  1/26/2021 0027 by Linda Alamo RN  Outcome: Met This Shift  Goal: Absence of new skin breakdown  Description: Absence of new skin breakdown  1/26/2021 1412 by Vinita Daniels RN  Outcome: Met This Shift  1/26/2021 1029 by Vinita Daniels RN  Outcome: Met This Shift  1/26/2021 0027 by Linda Alamo RN  Outcome: Met This Shift

## 2021-01-26 NOTE — PROGRESS NOTES
Subjective: The patient is in ICU  Transferred to ICU for persistent hypoglycemia and hypotension for closer monitoring  All vitals stable       Objective:    /74   Pulse 83   Temp 98.1 °F (36.7 °C) (Oral)   Resp 16   Ht 5' 3\" (1.6 m)   Wt 149 lb (67.6 kg)   SpO2 97%   BMI 26.39 kg/m²     In: 1312 [I.V.:1067; Blood:245]  Out: 760   In: 1312   Out: 760 [Urine:760]    General appearance: NAD, conversant  HEENT: AT/NC, MMM  Neck: FROM, supple  Lungs: Clear to auscultation  CV: RRR, no MRGs  Vasc: Radial pulses 2+  Abdomen: Soft, non-tender; no masses or HSM  Extremities: No peripheral edema or digital cyanosis  Skin: no rash, lesions or ulcers  Psych: Alert and oriented to person, place and time  Neuro: Alert and interactive     Recent Labs     01/25/21  1621 01/25/21  1856 01/26/21  0459   WBC 2.1* 2.3* 2.4*   HGB 7.1* 7.1* 8.0*   HCT 19.8* 20.4* 23.6*   PLT 36* 37* 22*       Recent Labs     01/24/21  1242 01/24/21  1246 01/26/21  0459     --  125*   K 4.1 4.28 3.5     --  96*   CO2 21*  --  19*   BUN 20  --  17   CREATININE 1.7*  --  1.8*   CALCIUM 7.8*  --  6.5*       Assessment:    Principal Problem:    Failure to thrive in adult  Active Problems:    Stage IV breast cancer in female ([facility]    HTN (hypertension), benign    Diabetes mellitus type 2, uncontrolled ([facility]    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    CKD (chronic kidney disease) stage 3, GFR 30-59 ml/min    COPD (chronic obstructive pulmonary disease) (HCC)    Pancytopenia (HCC)    Lung mass    Pleural effusion    Acute hypoxemic respiratory failure (HCC)  Resolved Problems:    * No resolved hospital problems.  *      Plan:  Admit to general medical floor for evaluation of failure to thrive/hypoglycemia in patient undergoing chemotherapy for metastatic breast cancer now with bony mets as well as mets to the lungs     IV fluid hydration with D10 due to persistent hypoglycemia/hypotension  Encourage p.o. intake Discontinue sulfonylureas  Discontinue negative chronotropic agents  monitor blood sugars every 6 hours    Spiculated lung lesion concerning for primary lung CA, patient with known history of metastatic breast cancer  Await possible IR directed biopsy  Possible metastatic disease from the lung as well  Hematology oncology following    Questionable colitis on CT abdomen pelvis  Continue Rocephin and Flagyl for now  If patient remains afebrile without symptoms, antibiotics can be discontinued     Pancytopenia  Likely secondary to chemotherapy/immunocompromised host  Continue to monitor   Transfuse if hemoglobin less than 7  Platelet transfusion if platelet count drops below 15,000     Palliative care evaluation to delineate goals of care  im not sure why she is in icu       DVT Prophylaxis   PT/OT  Discharge Donnie Abarca MD  12:40 PM  1/26/2021

## 2021-01-26 NOTE — PROGRESS NOTES
LSW reviewed chart, noted consult for Hospice for information. Noted that daughter Angeli Cornell was choiced for [address]. Referral made to [address] per family choice.

## 2021-01-26 NOTE — PROGRESS NOTES
[facility] Quality Flow/Interdisciplinary Rounds Progress Note        Quality Flow Rounds held on January 26, 2021    Disciplines Attending:  Jenny Seo and ICU team, bedside nurse, charge nurse, and pharmacist.    Lucía Duenas was admitted on 1/24/2021 12:22 PM    Anticipated Discharge Date:  Expected Discharge Date: 01/27/21    Disposition:    Oneal Score:  Oneal Scale Score: 12    Readmission Risk              Risk of Unplanned Readmission:        23           Discussed patient goal for the day, patient clinical progression, and barriers to discharge. The following Goal(s) of the Day/Commitment(s) have been identified:  Vasculat consult, saline bolus, code status to CCA.     Ankur Márquez  January 26, 2021

## 2021-01-26 NOTE — CONSULTS
Medical Intensive Care Unit     [facility]  Resident History and Physical    Date and time: 1/26/2021 3:06 AM  Patient's name:  Pat Burkitt  Medical Record Number: [id number]  Patient's account/billing number: [de-identified]  Patient's YOB: 1940  Age: 79 y.o. Date of Admission: 1/24/2021 12:22 PM  Length of stay during current admission: 2    Primary Care Physician: Sylvia Salazar MD  ICU Attending Physician: Dr. Keyla Cohen Status: Prior    Reason for ICU admission: hypoglycemia     History of Present Illness:   Patient is a 71-year-old female with a history of stage IV breast cancer ER/WV positive and HER-2/donald negative, GERD, COPD, hypertension, diabetes, and neuropathy who initially presented to the hospital on 1/24 due to to AMS and hypoglycemia at home. Upon arrival, her blood sugar was noted to be 29 and was given IV dextrose with some improvement in her mental status. Oxygen saturation noted to be in the high 80s in the ED, patient was placed on NRB. CT chest showed 22 mm spiculated nodule in the left upper lobe with central cavitation concerning for underlying lung malignancy, moderate centrilobular emphysema, moderate right pleural effusion and atelectasis in right lung base. CT A/P showed concerns for colitis as well as large amount of inflammatory ascites in the pelvis, possible rib cubitus ulcer and sclerotic lesion at T10 concerning for developing bone metastasis. Patient is getting ceftriaxone and Flagyl for diverticulitis. Neurology was called on 1/24 for bradycardia and AMS, AV blocking medications were held and AMS was thought to be due to hypoglycemia at that time given BG of 40. Pulmonology is following patient for lung mass risk considering CT biopsy with IR. Heme-onc is also following for pancytopenia, thrombocytopenia and concern for DIC, which were thought to be related to myelosuppression from chemotherapy. Patient continued to be hypoglycemic despite D10 infusion. Patient was planned to get 1 unit of packed RBCs in view of her hypoxia also improve oxygen carrying capacity but was unable to do so given she only had one IV access that was needed for the D10 infusion, so decision was made to transfer the patient to the ICU for central line and higher level of care. CURRENT VENTILATION STATUS:   [] Ventilator  [x] BIPAP  [] Nasal Cannula [] Room Air      IF INTUBATED, ET TUBE MARKING AT LOWER LIP:       cms    SECRETIONS   Amount:  [] Small [] Moderate  [] Large [x] None    Color:     [] White [] Colored  [] Bloody    SEDATION:  RAAS Score:  [] Propofol gtt  [] Versed gtt  [] Ativan gtt   [x] No Sedation    PARALYZED:  [x] No    [] Yes    VASOPRESSORS:  [x] No    [] Yes    If yes -   [] Levophed       [] Dopamine     [] Vasopressin       [] Dobutamine  [] Phenylephrine         [] Epinephrine    CENTRAL LINES:     [x] No   [] Yes   (Date of Insertion:   )           If yes -     [] Right IJ     [] Left IJ [] Right Femoral [] Left Femoral                   [] Right Subclavian [] Left Subclavian     MARCOS'S CATHETER:   [x] No   [] Yes  (Date of Insertion:   )     URINE OUTPUT:            [] Good   [] Low              [] Anuric    REVIEW OF SYSTEMS:    · Constitutional: No fever, no chills, no change in weight; good appetite  · HEENT: No blurred vision, no ear problems, no sore throat, no rhinorrhea. · Respiratory: No cough, no sputum production, no pleuritic chest pain, + shortness of breath  · Cardiology: No angina, no dyspnea on exertion, no paroxysmal nocturnal dyspnea, no orthopnea, no palpitation, no leg swelling. · Gastroenterology: No dysphagia, no reflux; + abdominal pain, no nausea or vomiting; no constipation or diarrhea.  No hematochezia   · Genitourinary: No dysuria, no frequency, hesitancy; no hematuria  · Musculoskeletal: no joint pain, no myalgia, no change in range of movement · Neurology: no focal weakness in extremities, no slurred speech, no double vision, no tingling or numbness sensation  · Endocrinology: no temperature intolerance, no polyphagia, polydipsia or polyuria  · Hematology: no increased bleeding, no bruising, no lymphadenopathy  · Skin: no skin changes noticed by patient  · Psychology: + depressed mood, no suicidal ideation    OBJECTIVE:     VITAL SIGNS:  BP (!) 154/93   Pulse 97   Temp 97.4 °F (36.3 °C)   Resp 12   Ht 5' 3\" (1.6 m)   Wt 149 lb (67.6 kg)   SpO2 (!) 86%   BMI 26.39 kg/m²   Tmax over 24 hours:  Temp (24hrs), Av.4 °F (36.3 °C), Min:97 °F (36.1 °C), Max:98.2 °F (36.8 °C)      Patient Vitals for the past 6 hrs:   BP Temp Temp src Pulse Resp SpO2   21 0205 (!) 154/93 97.4 °F (36.3 °C)  97 12    21 0100 136/67 97.6 °F (36.4 °C) Oral 83 17 (!) 86 %   21 2315 (!) 77/49 97.8 °F (36.6 °C) Temporal 82 14 97 %         Intake/Output Summary (Last 24 hours) at 2021 0306  Last data filed at 2021 0100  Gross per 24 hour   Intake 871 ml   Output 1525 ml   Net -654 ml     Wt Readings from Last 2 Encounters:   21 149 lb (67.6 kg)   20 149 lb 9.6 oz (67.9 kg)     Body mass index is 26.39 kg/m².       PHYSICAL EXAMINATION:  General appearance - AOx3, cooperative, disheveled   Mental status - alert, oriented to person, place, and time  Neck - supple, no significant adenopathy  Chest - clear to auscultation, no wheezes, rales or rhonchi, symmetric air entry  Heart - normal rate, regular rhythm, normal S1, S2, no murmurs, rubs, clicks or gallops  Abdomen - soft, ttp L side, nondistended, no masses or organomegaly  Neurological - alert, oriented, normal speech, no focal findings or movement disorder noted}  Extremities - peripheral pulses normal, no pedal edema, no clubbing or cyanosis  Skin - normal coloration and turgor, no rashes, no suspicious skin lesions noted      Any additional physical findings:    MEDICATIONS: Scheduled Meds:   latanoprost  1 drop Left Eye QPM    brimonidine  1 drop Left Eye QAM    [Held by provider] gabapentin  300 mg Oral BID    [Held by provider] gabapentin  400 mg Oral Nightly    pantoprazole  40 mg Oral Daily    cetirizine  10 mg Oral Daily    [Held by provider] verapamil  180 mg Oral BID    metroNIDAZOLE  500 mg Intravenous Q8H    cefTRIAXone (ROCEPHIN) IV  1,000 mg Intravenous Q24H    budesonide  0.25 mg Nebulization BID    And    Arformoterol Tartrate  15 mcg Nebulization BID     Continuous Infusions:   sodium chloride      dextrose      dextrose 75 mL/hr at 01/25/21 1602     PRN Meds:       sodium chloride, , PRN      albuterol, 2.5 mg, Q4H PRN      glucose, 15 g, PRN      dextrose, 12.5 g, PRN      glucagon (rDNA), 1 mg, PRN      dextrose, 100 mL/hr, PRN        VENT SETTINGS (Comprehensive) (if applicable):  Vent Information  SpO2: (!) 86 %  Additional Respiratory  Assessments  Pulse: 97  Resp: 12  SpO2: (!) 86 %  Oral Care: Mouth moisturizer, Mouth suctioned    ABGs:   Recent Labs     01/24/21  1246   PH 7.390   PCO2 33.3*   PO2 414.7*   HCO3 19.7*   BE -4.7*   O2SAT 99.3*       Laboratory findings:  Complete Blood Count:   Recent Labs     01/24/21  1814 01/25/21  1621 01/25/21  1856   WBC 2.6* 2.1* 2.3*   HGB 7.3* 7.1* 7.1*   HCT 20.8* 19.8* 20.4*   PLT 40* 36* 37*        Last 3 Blood Glucose:   Recent Labs     01/24/21  1242 01/25/21  1621   GLUCOSE 150* 106*        PT/INR:    Lab Results   Component Value Date    PROTIME 19.7 01/24/2021    PROTIME 12.5 11/23/2010    INR 1.7 01/24/2021     PTT:    Lab Results   Component Value Date    APTT 41.3 01/24/2021       Comprehensive Metabolic Profile:   Recent Labs     01/24/21  1242 01/24/21  1246 01/25/21  1621     --   --    K 4.1 4.28  --      --   --    CO2 21*  --   --    BUN 20  --   --    CREATININE 1.7*  --   --    GLUCOSE 150*  --  106*   CALCIUM 7.8*  --   --    PROT 5.4*  --   --    LABALBU 2.1*  --   -- BILITOT 0.4  --   --    ALKPHOS 160*  --   --    AST 42*  --   --    ALT 24  --   --       Magnesium:   Lab Results   Component Value Date    MG 1.6 01/24/2021     Phosphorus:   Lab Results   Component Value Date    PHOS 3.4 01/24/2021     Ionized Calcium: No results found for: CAION   Troponin:   Recent Labs     01/24/21  1242   TROPONINI <0.01     Urinalysis: Urine dipstick shows not done. Micro exam: not done. Microbiology:  Cultures during this admission:     Blood cultures:                 [] None drawn      [] Negative             []  Positive (Details:  )  Urine Culture:                   [] None drawn      [] Negative             []  Positive (Details:  )  Sputum Culture:               [] None drawn       [] Negative             []  Positive (Details:  )   Endotracheal aspirate:     [] None drawn       [] Negative             []  Positive (Details:  )     Other pertinent Labs:     Radiology/Imaging:   Chest Xray (1/26/2021):    ASSESSMENT  And PLAN:      Assessment:    1. Hypoglycemia, in the setting of insulin use and YONG, currently on D10 infusion,  2. Acute hypoxic respiratory failure, in the setting of low hemoglobin decreased oxygen carrying capacity, not on any home oxygen, was on 4 L NC on admission, currently on BiPAP as his saturation remained in the 80s on NRB and HFNC, CXR 1/26 unremarkable  3. Stage IV breast cancer, ER/NM positive and HER-2/donald negative, heme-onc following, s/p right mastectomy, on chemotherapy, with possible bone mets  4. Pancytopenia, likely related to myelosuppression by chemotherapy medications  5. Anemia of chronic disease in the setting of CKD, ferritin 296, iron 105, iron sat 112, TIBC 94, hemoglobin 7.1 upon transfer to the ICU,  6. Colitis, noted on CT A/P, on ceftriaxone and Flagyl  7. 22 mm spiculated lung nodule in left upper lobe, concerning for malignancy, discussion being held regarding CT-guided biopsy  8.  COPD, continue breathing treatments 9. DM2, holding all hyperglycemic medications due to hypoglycemia  10. Neuropathy, holding gabapentin due to AMS      Plan:      -Continue D10 infusion and frequent POCT checks   -Follow-up cortisol   -Continue to hold antihyperglycemic agents   -Continue BiPAP and wean as tolerated   -Transfuse 1 unit PRBC   -Follow-up D-dimer, troponin, proBNP   -Continue antibiotics   -Follow-up pulmonology and heme-onc recommendations endocrinologist to monitor nodule   -Continue breathing treatments   -Palliative care following       WEAN PER PROTOCOL:  [] No   [x] Yes  [] N/A    ICU PROPHYLAXIS:  Stress ulcer:  [x] PPI Agent  [] [id number] [] Sucralfate  [] Other:  VTE:   [] Enoxaparin  [] Unfract. Heparin Subcut  [x] EPC Cuffs    NUTRITION:  [] NPO [] Tube Feeding (Specify: ) [] TPN  [x] PO (Diet: DIET GENERAL;)    INSULIN DRIP:   [x] No   [] Yes    CONSULTATION NEEDED:   [] No   [x] Yes    FAMILY UPDATED:    [x] No   [] Yes    TRANSFER OUT OF ICU:   [x] No   [] Yes    Amadou Hernández MD PGY-2  Attending Physician: Dr. Ofelia Soto   1/26/2021, 3:06 AM     I personally saw, examined and provided care for the patient. Radiographs, labs and medication list were reviewed by me independently. I spoke with bedside nursing, therapists and consultants. Critical care services and times documented are independent of procedures and multidisciplinary rounds with Residents. Additionally comprehensive, multidisciplinary rounds were conducted with the MICU team. The case was discussed in detail and plans for care were established. Review of Residents documentation was conducted and revisions were made as appropriate. I agree with the above documented exam, problem list and plan of care. Prognosis discussed with patients daughter code status changed to dnr cca   Monitor bg   Chelsey Dennis M.D.    Pulmonary/Critical Care Medicine   38 min cct excluding procedures

## 2021-01-26 NOTE — CONSULTS
Chief Complaint: Patient seen for evaluation of insertion of a vena cava filter      HPI: This patient, who unfortunately multiple comorbid risk factors, was hospitalized with a history of altered mental status, was found to hypoglycemia also, as part of the work-up, was found to have swelling of the legs, did undergo venous ultrasound study revealed evidence consistent with acute deep vein thrombosis in the left iliofemoral popliteal venous system and because of underlying severe thrombocytopenia, today's platelet count being 22,000, patient was not a candidate for full dose anticoagulation and vascular service was consulted for consideration of for placement of a vena cava filter    Also when the patient came to the hospital, her blood sugars were in the range of 30 to 40 mg, when I evaluated the EMS service    Patient also has history of metastatic carcinoma of the breast with bone involvement leg with history of hypertension, diabetes mellitus and chronic obstructive lung disease etc. when I came to see patient, patient was somewhat sleepy but arousable, responds appropriately, mostly information was gathered from the patient's chart,    And also her daughter who was in the room, [address]      Patient denies any focal lateralizing neurological symptoms like loss of speech, vision or loss of function of extremity    Patient can walk only short distances, and denies any symptoms of rest pain    Allergies   Allergen Reactions    Alcohol     Eggs Or Egg-Derived Products     Rubbing Alcohol [Alc-Cynara Scolymus] Hives    Ethanol Rash     Patient gets blisters       Penicillins Rash    Sulfa Antibiotics Itching and Rash       Current Facility-Administered Medications   Medication Dose Route Frequency Provider Last Rate Last Admin    ipratropium-albuterol (DUONEB) nebulizer solution 1 ampule  1 ampule Inhalation Q4H WA Edna Sheridan MD   1 ampule at 01/26/21 9631  glucagon (rDNA) injection 1 mg  1 mg Intramuscular PRN Bharat May MD        dextrose 5 % solution  100 mL/hr Intravenous PRN Bharat May MD        budesonide (PULMICORT) nebulizer suspension 250 mcg  0.25 mg Nebulization BID Bharatstevo May MD   250 mcg at 01/26/21 0117    And    Arformoterol Tartrate (BROVANA) nebulizer solution 15 mcg  15 mcg Nebulization BID Bharat May MD   15 mcg at 01/26/21 5028    dextrose 10 % infusion   Intravenous Continuous Bharat May MD 75 mL/hr at 01/26/21 0433 New Bag at 01/26/21 0433       Past Medical History:   Diagnosis Date    Arthritis     Cancer [facility])     breast    COPD (chronic obstructive pulmonary disease) ([facility] [address] 6/27/2019    Diabetes mellitus ([facility] [address] [facility]     GERD (gastroesophageal reflux disease)     Glaucoma     Hypertension     Neuropathy     Unspecified cerebral artery occlusion with cerebral infarction        Past Surgical History:   Procedure Laterality Date    BREAST LUMPECTOMY      BREAST SURGERY      right partial mastectomy    CHOLECYSTECTOMY      COLONOSCOPY N/A 6/4/2013    DIVERTICULOSIS;POLYPS @15CM    ECHO COMPL W DOP COLOR FLOW  1/9/2013         EYE SURGERY      HYSTERECTOMY         Family History   Problem Relation Age of Onset    High Blood Pressure Mother     Stroke Mother     Early Death Father 61        liver disease    Heart Disease Father     High Blood Pressure Father     Asthma Sister     Cancer Brother     High Blood Pressure Brother     Stroke Brother        Social History     Socioeconomic History    Marital status:      Spouse name: Not on file    Number of children: Not on file    Years of education: Not on file    Highest education level: Not on file   Occupational History    Not on file   Social Needs    Financial resource strain: Not on file    Food insecurity     Worry: Not on file     Inability: Not on file    Transportation needs     Medical: Not on file     Non-medical: Not on file Tobacco Use    Smoking status: Former Smoker     Packs/day: 0.50     Years: 25.00     Pack years: 12.50     Start date: 1971     Quit date: 1998     Years since quittin.0    Smokeless tobacco: Never Used   Substance and Sexual Activity    Alcohol use: No    Drug use: No    Sexual activity: Not on file   Lifestyle    Physical activity     Days per week: Not on file     Minutes per session: Not on file    Stress: Not on file   Relationships    Social connections     Talks on phone: Not on file     Gets together: Not on file     Attends Taoist service: Not on file     Active member of club or organization: Not on file     Attends meetings of clubs or organizations: Not on file     Relationship status: Not on file    Intimate partner violence     Fear of current or ex partner: Not on file     Emotionally abused: Not on file     Physically abused: Not on file     Forced sexual activity: Not on file   Other Topics Concern    Not on file   Social History Narrative    Not on file       Review of Systems:  Skin:  No abnormal pigmentation or rash. Eyes:  No blurring, diplopia or vision loss. Ears/Nose/Throat:  No hearing loss or vertigo. Respiratory:  No cough, pleuritic chest pain, dyspnea, or wheezing. Chronic obstructive lung disease    Cardiovascular: No angina, palpitations . Hypertension    Gastrointestinal:  No nausea or vomiting; no abdominal pain or rectal bleeding. Musculoskeletal:  No arthritis or weakness. Neurologic:  No paralysis, paresis, seizures or headaches. Hematologic/Lymphatic/Immunologic:  No anemia, abnormal bleeding/bruising. Stage IV breast cancer, with bone involvement, pancytopenia, thrombocytopenia with a current platelet count of 71,277    Endocrine:  No heat or cold intolerance. No polyphagia, polydipsia or polyuria.   Diabetes mellitus, currently hospitalized with hypoglycemia      Physical Exam: /75   Pulse 72   Temp 98.1 °F (36.7 °C) (Oral)   Resp 15   Ht 5' 3\" (1.6 m)   Wt 149 lb (67.6 kg)   SpO2 97%   BMI 26.39 kg/m²   General appearance:  Alert, awake, somewhat sleepy but responds appropriately  Skin:  Warm and dry. Head:  Normocephalic. No masses, lesions or tenderness. Eyes:  Conjunctivae appear normal; PERRL. Ears:  External ears normal.  Nose/Sinuses:  Septum midline, mucosa normal; no drainage. Oropharynx:  Clear, no exudate noted. Neck:  No jugular venous distention, lymphadenopathy or thyromegaly. No evidence of carotid bruit      Lungs:  Clear to ausculation bilaterally. No rhonchi, crackles, wheezes. Heart:  Regular rate and rhythm. No rub or murmur. .    Abdomen:  Soft, non-tender. No masses, organomegaly. Musculoskeletal: No joint effusions, tenderness swelling or warmth. Neuro: Speech is intact. Moving all extremities. No focal motor or sensory deficits. Extremities:  Both feet are warm to touch. The color of both feet is normal.      Edema both legs, left leg more than the right leg noted    Pulses Right  Left    Brachial 3 3    Radial    3=normal   Femoral 2 2  2=diminished   Popliteal    1=barely palpable   Dorsalis pedis 0 0  0=absent   Posterior tibial    4=aneurysmal           Other pertinent information:1. The past medical records were reviewed.     2.    Lab Results   Component Value Date    WBC 2.4 (L) 01/26/2021    HGB 8.0 (L) 01/26/2021    HCT 23.6 (L) 01/26/2021    MCV 97.9 01/26/2021    PLT 22 (L) 01/26/2021      Lab Results   Component Value Date     (L) 01/26/2021    K 4.2 01/26/2021     01/26/2021    CO2 20 (L) 01/26/2021    BUN 16 01/26/2021    CREATININE 1.7 (H) 01/26/2021    GLUCOSE 160 (H) 01/26/2021    CALCIUM 6.6 (L) 01/26/2021    PROT 4.4 (L) 01/26/2021    LABALBU 1.7 (L) 01/26/2021    BILITOT 0.4 01/26/2021    ALKPHOS 117 (H) 01/26/2021    AST 50 (H) 01/26/2021    ALT 23 01/26/2021    LABGLOM 35 01/26/2021 GFRAA 35 01/26/2021     Lab Results   Component Value Date    APTT 41.3 (H) 01/24/2021      Lab Results   Component Value Date    INR 1.8 01/26/2021    INR 1.7 01/24/2021    INR 1.2 06/26/2019    PROTIME 20.6 (H) 01/26/2021    PROTIME 19.7 (H) 01/24/2021    PROTIME 14.1 (H) 06/26/2019        3. US DUP LOWER EXTREMITIES BILATERAL VENOUS   Final Result   There is evidence for deep venous thrombosis, on the left   ALERT:  THIS IS AN ABNORMAL REPORT      XR CHEST PORTABLE   Final Result   No acute abnormality identified. CT ABDOMEN PELVIS WO CONTRAST Additional Contrast? None   Final Result   Diffuse wall thickening and edema of the ascending and transverse colon   concerning for colitis. There is a large amount of inflammatory ascites in   the pelvis. Atelectasis/infiltrates and pleural effusion lung bases likely pneumonia or   edema. Collapsed stomach with wall thickening. Gastritis is a consideration. Possible decubitus ulcer in the left gluteus region and sclerotic lesion on   T10 concerning for developing bone metastasis. CT HEAD WO CONTRAST   Final Result   No acute intracranial abnormality. CT CHEST WO CONTRAST   Final Result   22 mm spiculated nodule within the lateral aspect of the left upper lobe with   central cavitation. The lesion is concerning for underlying lung malignancy. Considering the size and appearance either tissue sampling or PET-CT   examination can be performed   Moderate centrilobular emphysematous changes of the lungs. Moderate right pleural effusion and atelectatic changes of right lung base. Significant colonic wall thickening with surrounding fatty stranding is   highly concerning for colitis. For further evaluation CT of the abdomen can   be obtained. XR CHEST PORTABLE   Final Result   Stable nonacute chest with atelectasis in the lung bases.         4.  The history physical, hematology pulmonary, critical care consultation notes were reviewed 5.  The venous ultrasound study was personally reviewed by me revealed evidence of extensive left iliofemoral vein thrombosis    6. CBC revealed pancytopenia, anemia, today's platelet count is 65,573, decreased fibrinogen level and elevated PT PTT    Assessment:    1. Acute deep vein thrombosis left leg involving left iliofemoral popliteal venous system    2. Pancytopenia, thrombocytopenia felt to be partly contributed by chemotherapy, Ibrance by the hematology service    3. Stage IV metastatic carcinoid breast with bone involvement    4. Chronic obstructive lung disease    5. Hypertension    6. Hypoglycemia with history of diabetes mellitus        Plan:     Discussed the detail of the patient in 1 partly with the patient's daughter Chai Potter who was in the room, telephone #[phone], explained to them the situation namely extensive deep vein thrombosis of the left leg, with contraindication for full dose anticoagulation because of thrombocytopenia consideration of the placement of vena cava filter for prevention of major pulmonary embolus    I have also discussed over the telephone with the patient's second daughter, Bry Aguirre who is a nurse       The risks, benefits, options and alternatives were clearly explained including bleeding, clotting, infection, arterial, venous, nerve, cardiopulmonary complications, chances of major complications, particularly due to severe thrombocytopenia, requiring platelet transfusion, blood product transfusion including FFP due to decreased fibrinogen and elevated PT PTT etc. which they understand and consent for surgery.  All  questions were answered    Discussed with nursing staff, Camryn Brooks    Thank you for letting us participate in the care of your patient      Electronically signed by Loree Caraballo MD on 1/26/2021 at 3:53 PM

## 2021-01-26 NOTE — FLOWSHEET NOTE
Inpatient Wound Care(initial evaluation)  4426    Admit Date: 1/24/2021 12:22 PM    Reason for consult: decubiti    Significant history:  CHIEF COMPLAINT: Change in mentation  significant past medical history of breast cancer with mets to the bones, hypertension, COPD, diabetes mellitus non-insulin-dependent, neuropathy who presents with complaints of change in mentation at home per family. Patient has apparently been receiving her diabetic medications however has not been eating or drinking at her baseline since initiation of what appears to be chemotherapy.       Findings:     01/26/21 1115   Skin Integrity   Skin Integrity   (dry flaky skin)   Location generalized   Skin Integrity Site 2   Skin Integrity Location 2   (blanchable, soft, painful)   Location 2 heels     Impression: no pressure injuries    Plan:  Aquaphor to dry skin  Will need continued preventative care      Kamilah Melgar 1/26/2021 11:44 AM

## 2021-01-27 NOTE — PROGRESS NOTES
Call placed to daughter Ligia Hector regarding hospice consult for information. No answer today, left message to please call me if she still wanted information.

## 2021-01-27 NOTE — PROGRESS NOTES
Occupational Therapy       Chart reviewed. Discussed pt case with RN. Pt off unit and just returned from IVC placement procedure. RN requested that pt rest at time of attempt and therapist return at a later time. Will attempt at a later time as able/appropriate. Thank you.  Geovanny Tristan, OTR/L #[id number]

## 2021-01-27 NOTE — PLAN OF CARE
Problem: Falls - Risk of:  Goal: Will remain free from falls  Description: Will remain free from falls  1/27/2021 0642 by Xin Foss RN  Outcome: Met This Shift     Problem: Falls - Risk of:  Goal: Absence of physical injury  Description: Absence of physical injury  1/27/2021 0715 by Xin Foss RN  Outcome: Met This Shift

## 2021-01-27 NOTE — PROGRESS NOTES
Patient care given, procedure site dry intact, no hematoma noted, patient denies pain, numbness lower extremities.

## 2021-01-27 NOTE — CARE COORDINATION
1/27/2021  Care Coordination:  Contacted by patient's [employer] who clarified that patient has not received services/aides through [employer] as reported by her daughter Roxanne Morales. Attempted to reach Roxanne  [phone] to assist with transition of care. PT/OT evaluations indicate patient is dependent and will require 24/7 care. SW/Cm will continue to follow and assist with transition of care.

## 2021-01-27 NOTE — ANESTHESIA PRE PROCEDURE
Oyster Shell 500 MG TABS Take 500 mg by mouth daily. Yes Historical Provider, MD   verapamil (CALAN-SR) 180 MG CR tablet Take 180 mg by mouth 2 times daily. Yes Historical Provider, MD   Lansoprazole (PREVACID PO) Take 30 mg by mouth daily. Yes Historical Provider, MD   losartan-hydrochlorothiazide (HYZAAR) 50-12.5 MG per tablet Take 1 tablet by mouth daily. Yes Historical Provider, MD   Loratadine (CLARITIN) 10 MG CAPS Take 1 capsule by mouth daily.    Yes Historical Provider, MD   glimepiride (AMARYL) 1 MG tablet Take 1 tablet by mouth 2 times daily (with meals) If eating  Patient taking differently: Take 1 mg by mouth daily If eating 6/28/19   Estuardo Ackerman MD       Current medications:    Current Facility-Administered Medications   Medication Dose Route Frequency Provider Last Rate Last Admin    ipratropium-albuterol (DUONEB) nebulizer solution 1 ampule  1 ampule Inhalation Q4H WA Claudetta Homer, MD   1 ampule at 76/16/11 7638    folic acid injection 1 mg  1 mg Intravenous Daily Claudetta Homer, MD   1 mg at 01/26/21 6039    Mineral Oil-Hydrophil Petrolat OINT   Topical BID Jenniffer Pradhan MD   Given at 01/26/21 2023    octreotide (SANDOSTATIN) injection 75 mcg  75 mcg Subcutaneous Q6H PRN Mauro Swain MD        cefTRIAXone (ROCEPHIN) 1,000 mg in sterile water 10 mL IV syringe  1,000 mg Intravenous Q24H Jenniffer Pradhan MD   1,000 mg at 01/26/21 2022    metronidazole (FLAGYL) 500 mg in NaCl 100 mL IVPB premix  500 mg Intravenous Ova MD Drew   Stopped at 01/27/21 0434    0.9 % sodium chloride infusion   Intravenous PRN Govind Levin MD        hydrocortisone sodium succinate PF (SOLU-CORTEF) injection 50 mg  50 mg Intravenous Q8H Jenniffer Pradhan MD   50 mg at 01/27/21 0334    [Held by provider] dextrose 5 % and 0.45 % sodium chloride infusion   Intravenous Continuous Cony Wu MD 50 mL/hr at 01/26/21 2125 Rate Change at 01/26/21 2125  insulin lispro (HUMALOG) injection vial 0-6 Units  0-6 Units Subcutaneous Q4H Azul Nj MD   3 Units at 01/27/21 1073    0.9 % sodium chloride infusion   Intravenous PRN Gary Abraham MD        albuterol (PROVENTIL) nebulizer solution 2.5 mg  2.5 mg Nebulization Q4H PRN Olivia Gutierrez MD        latanoprost (XALATAN) 0.005 % ophthalmic solution 1 drop  1 drop Left Eye QPM Olivia Gutierrez MD   1 drop at 01/26/21 1840    brimonidine (ALPHAGAN) 0.2 % ophthalmic solution 1 drop  1 drop Left Eye QAM Olivia Gutierrez MD   1 drop at 01/26/21 0904    [Held by provider] gabapentin (NEURONTIN) capsule 300 mg  300 mg Oral BID Olivia Gutierrez MD   Stopped at 01/26/21 0800    [Held by provider] gabapentin (NEURONTIN) capsule 400 mg  400 mg Oral Nightly Olivia Gutierrez MD        pantoprazole (PROTONIX) tablet 40 mg  40 mg Oral Daily Olivia Gutierrez MD   40 mg at 01/26/21 4055    cetirizine (ZYRTEC) tablet 10 mg  10 mg Oral Daily Olivia Gutierrez MD   10 mg at 01/26/21 6290    [Held by provider] verapamil (CALAN SR) extended release tablet 180 mg  180 mg Oral BID Olivia Gutierrez MD   Stopped at 01/26/21 0900    glucose (GLUTOSE) 40 % oral gel 15 g  15 g Oral PRN Olivia Gutierrez MD   15 g at 01/25/21 0853    dextrose 50 % IV solution  12.5 g Intravenous PRN Olivia Gutierrez MD   12.5 g at 01/26/21 0334    glucagon (rDNA) injection 1 mg  1 mg Intramuscular PRN Olivia Gutierrez MD        dextrose 5 % solution  100 mL/hr Intravenous PRN Olivia Gutierrez MD        budesonide (PULMICORT) nebulizer suspension 250 mcg  0.25 mg Nebulization BID Olivia Gutierrez MD   250 mcg at 01/26/21 2056    And    Arformoterol Tartrate (BROVANA) nebulizer solution 15 mcg  15 mcg Nebulization BID Olivia Gutierrez MD   15 mcg at 01/26/21 2056       Allergies:     Allergies   Allergen Reactions    Alcohol     Eggs Or Egg-Derived Products     Rubbing Alcohol [Alc-Cynara Scolym] Hives    Ethanol Rash     Patient gets blisters       Penicillins Rash  Sulfa Antibiotics Itching and Rash       Problem List:    Patient Active Problem List   Diagnosis Code    Diastolic dysfunction C07.91    Stage IV breast cancer in female ([facility] C50.919    HTN (hypertension), benign I10    Diabetes mellitus type 2, uncontrolled ([facility] E11.65    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, 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.0    Hypokalemia E87.6    CKD (chronic kidney disease) stage 3, GFR 30-59 ml/min N18.30    Acute diverticulitis K57.92    Hypoxia R09.02    COPD (chronic obstructive pulmonary disease) ([facility]) J44.9    Pancytopenia ([facility]) D61.818    Lung mass R91.8    Pleural effusion J90    Acute hypoxemic respiratory failure ([facility]) J96.01    Failure to thrive in adult R62.7    Acute deep vein thrombosis (DVT) of femoral vein of left lower extremity ([facility]) I82.412    Thrombocytopenia ([facility]) D69.6       Past Medical History:        Diagnosis Date    Acute deep vein thrombosis (DVT) of femoral vein of left lower extremity ([facility]) 2021    Arthritis     Cancer ([facility])     breast    COPD (chronic obstructive pulmonary disease) ([facility] 2019    Diabetes mellitus ([facility])     GERD (gastroesophageal reflux disease)     Glaucoma     Hypertension     Neuropathy     [facility]ocyt[facility] 2021    Unspecified cerebral artery occlusion with cerebral infarction        Past Surgical History:        Procedure Laterality Date    BREAST LUMPECTOMY      BREAST SURGERY      right partial mastectomy    CHOLECYSTECTOMY      COLONOSCOPY N/A 2013    DIVERTICULOSIS;POLYPS @15CM    ECHO COMPL W DOP COLOR FLOW  2013         EYE SURGERY      HYSTERECTOMY         Social History:    Social History     Tobacco Use    Smoking status: Former Smoker     Packs/day: 0.50     Years: 25.00     Pack years: 12.50     Start date: 1971     Quit date: 1998     Years since quittin.0    Smokeless tobacco: Never Used   Substance Use Topics    Alcohol use:  No Counseling given: Not Answered      Vital Signs (Current):   Vitals:    01/27/21 0300 01/27/21 0400 01/27/21 0500 01/27/21 0600   BP: 118/87 (!) 134/94 (!) 145/92 125/86   Pulse: 90 98 103 93   Resp: 14 18 28 18   Temp:  99.3 °F (37.4 °C)     TempSrc:  Axillary     SpO2: 100% 100% 100% 97%   Weight:       Height:                                                  BP Readings from Last 3 Encounters:   01/27/21 125/86   12/16/20 127/78   10/10/19 132/78       NPO Status:                                                                                 BMI:   Wt Readings from Last 3 Encounters:   01/24/21 149 lb (67.6 kg)   12/16/20 149 lb 9.6 oz (67.9 kg)   04/15/20 163 lb (73.9 kg)     Body mass index is 26.39 kg/m². CBC:   Lab Results   Component Value Date    WBC 4.6 01/27/2021    RBC 3.07 01/27/2021    HGB 10.2 01/27/2021    HCT 28.1 01/27/2021    MCV 91.5 01/27/2021    RDW 16.3 01/27/2021    PLT 37 01/27/2021       CMP:   Lab Results   Component Value Date     01/27/2021    K 4.4 01/27/2021    K 3.6 06/28/2019    CL 97 01/27/2021    CO2 11 01/27/2021    BUN 17 01/27/2021    CREATININE 1.8 01/27/2021    GFRAA 33 01/27/2021    LABGLOM 33 01/27/2021    GLUCOSE 277 01/27/2021    GLUCOSE 150 02/29/2012    PROT 5.4 01/27/2021    CALCIUM 6.8 01/27/2021    BILITOT 0.5 01/27/2021    ALKPHOS 135 01/27/2021    AST 48 01/27/2021    ALT 29 01/27/2021       POC Tests: No results for input(s): POCGLU, POCNA, POCK, POCCL, POCBUN, POCHEMO, POCHCT in the last 72 hours.     Coags:   Lab Results   Component Value Date    PROTIME 19.5 01/27/2021    PROTIME 12.5 11/23/2010    INR 1.7 01/27/2021    APTT 41.5 01/27/2021       HCG (If Applicable): No results found for: PREGTESTUR, PREGSERUM, HCG, HCGQUANT     ABGs: No results found for: PHART, PO2ART, TFI5EHZ, PSB7JDM, BEART, K3NJQISC     Type & Screen (If Applicable):  No results found for: LABABO, LABRH    Drug/Infectious Status (If Applicable): No results found for: HIV, HEPCAB    COVID-19 Screening (If Applicable):   Lab Results   Component Value Date    COVID19 Not Detected 01/24/2021         Anesthesia Evaluation    Airway: Mallampati: II  TM distance: >3 FB   Neck ROM: full  Mouth opening: > = 3 FB Dental:          Pulmonary: breath sounds clear to auscultation  (+) COPD:                            ROS comment: Pleural effusion, lung mass   Cardiovascular:    (+) hypertension:,         Rhythm: regular                      Neuro/Psych:   (+) CVA:,             GI/Hepatic/Renal:   (+) GERD:, renal disease:,           Endo/Other:    (+) DiabetesType II DM, , blood dyscrasia: anemia:., malignancy/cancer. ROS comment: Severe hypoglycemia, anemia, breast CA Abdominal:           Vascular:                                  Anesthesia Plan      MAC     ASA 4       Induction: intravenous.                           Buzzy Meckel, MD   1/27/2021

## 2021-01-27 NOTE — PROGRESS NOTES
[facility]  Hematology/Oncology  Consult      Patient Name: Rosales Henley  YOB: 1940  PCP: Castro Roger MD   Referring Provider:      Reason for Consultation:   Chief Complaint   Patient presents with    Hypoglycemia     BGL for ems 29, D10 250ml given,  now        Subjective: Remains in ICU sp IVC filter placement. Patient thought she had the lung bx done and stated she wanted to know the results. I explained she had the filter placed and that it hadn't been decided yet about the lung bx but she said she wanted it to have it. History of Present Illness: This is a 71-year-old female patient of Dr Harris Cuellar being treated for stage IV breast cancer ER/MI positive and HER-2/donald negative. She is status post a right mastectomy and is currently on Ibrance/Faslodex and Xgeva. She received cycle 7 of Faslodex on 1/5. She also has a past medical history of GERD, COPD, HTN, neuropathy, and diabetes. She presented to the emergency room via EMS with altered mental status and hypoglycemia. Upon arrival to the house her blood sugar was 29 and was given IV Dex and had some improvement of her mental status. Upon arrival to the emergency room her blood sugar was at 40, oxygen was 86% on room air and she was bradycardic so a rapid response team was called. She was stabilized and admitted for further treatment. CBC showed for pancytopenia. Fibrinogen 97. CTA chest showed 22 mm spiculated nodule within the lateral aspect of the left upper lobe with central cavitation. Moderate centrilobular emphysematous changes of the lungs. Moderate right pleural effusion and atelectatic changes of right lung base. CT A/P showed colitis in the ascending and transverse. There is a large amount of inflammatory ascites in the pelvis. Collapsed stomach with wall thickening. Gastritis is a consideration. Possible decubitus ulcer in the left gluteus region and sclerotic lesion on T10 concerning for developing bone metastasis. Consultation for concern of DIC due to thrombocytopenia superimposed on chronic cytopenias and coagulopathy.        Diagnostic Data:     Past Medical History:   Diagnosis Date    Acute deep vein thrombosis (DVT) of femoral vein of left lower extremity ([address] 1/26/2021    Arthritis     Cancer (HCC)     breast    COPD (chronic obstructive pulmonary disease) ([address] 6/27/2019    Diabetes mellitus (HCC)     GERD (gastroesophageal reflux disease)     Glaucoma     Hypertension     Neuropathy     Thrombocytopenia ([address] 1/26/2021  Unspecified cerebral artery occlusion with cerebral infarction        Patient Active Problem List    Diagnosis Date Noted    Acute deep vein thrombosis (DVT) of femoral vein of left lower extremity ([facility] 01/26/2021    Thrombocytopenia (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 11/29/2014    Hypokalemia 11/29/2014    CKD (chronic kidney disease) stage 3, GFR 30-59 ml/min 11/29/2014    Acute diverticulitis 11/29/2014    Stage IV breast cancer in female [facility]) 40/41/9861    Diastolic dysfunction 43/20/9994        Past Surgical History:   Procedure Laterality Date    BREAST LUMPECTOMY      BREAST SURGERY      right partial mastectomy    CHOLECYSTECTOMY      COLONOSCOPY N/A 6/4/2013    DIVERTICULOSIS;POLYPS @15CM    ECHO COMPL W DOP COLOR FLOW  1/9/2013         EYE SURGERY      HYSTERECTOMY      VENA CAVA FILTER PLACEMENT N/A 1/27/2021    VENA CAVA FILTER INSERTION performed by Vira Yung MD at [facility] OR       Family History  Family History   Problem Relation Age of Onset    High Blood Pressure Mother     Stroke Mother     Early Death Father 61        liver disease    Heart Disease Father     High Blood Pressure Father     Asthma Sister     Cancer Brother     High Blood Pressure Brother     Stroke Brother        Social History    TOBACCO:   reports that she quit smoking about 23 years ago. She started smoking about 49 years ago. She has a 12.50 pack-year smoking history. She has never used smokeless tobacco.  ETOH:   reports no history of alcohol use.     Home Medications Prior to Admission medications    Medication Sig Start Date End Date Taking? Authorizing Provider   prednisoLONE acetate (PRED FORTE) 1 % ophthalmic suspension Place 1 drop into both eyes daily   Yes Historical Provider, MD   ketorolac (ACULAR) 0.5 % ophthalmic solution Place 1 drop into both eyes daily   Yes Historical Provider, MD   fluticasone-vilanterol (BREO ELLIPTA) 100-25 MCG/INH AEPB inhaler Inhale 1 puff into the lungs daily 12/16/20  Yes Nissa Potter MD   potassium chloride (MICRO-K) 10 MEQ extended release capsule Take 20 mEq by mouth 2 times daily   Yes Historical Provider, MD   albuterol (PROVENTIL) (2.5 MG/3ML) 0.083% nebulizer solution Take 3 mLs by nebulization every 6 hours as needed for Wheezing 6/28/19  Yes Nissa Potter MD   dexamethasone (DECADRON) 0.1 % ophthalmic solution 1 drop 3/7/19  Yes Historical Provider, MD   clobetasol prop emollient base 0.05 % CREA    Yes Historical Provider, MD   gabapentin (NEURONTIN) 400 MG capsule nightly 5/30/19  Yes Historical Provider, MD   ofloxacin (OCUFLOX) 0.3 % solution Place 1 drop into both eyes  3/7/19  Yes Historical Provider, MD   minocycline (MINOCIN;DYNACIN) 100 MG capsule Take 100 mg by mouth 2 times daily 12/18/12  Yes Historical Provider, MD   palbociclib (IBRANCE) 75 MG capsule 100 mg    Yes Historical Provider, MD   gabapentin (NEURONTIN) 300 MG capsule Take 300 mg by mouth 2 times daily. Yes Historical Provider, MD   albuterol (PROVENTIL HFA;VENTOLIN HFA) 108 (90 BASE) MCG/ACT inhaler Inhale 2 puffs into the lungs every 6 hours as needed for Wheezing. Yes Historical Provider, MD   Oyster Shell 500 MG TABS Take 500 mg by mouth daily. Yes Historical Provider, MD   verapamil (CALAN-SR) 180 MG CR tablet Take 180 mg by mouth 2 times daily. Yes Historical Provider, MD   Lansoprazole (PREVACID PO) Take 30 mg by mouth daily.    Yes Historical Provider, MD losartan-hydrochlorothiazide (HYZAAR) 50-12.5 MG per tablet Take 1 tablet by mouth daily. Yes Historical Provider, MD   Loratadine (CLARITIN) 10 MG CAPS Take 1 capsule by mouth daily. Yes Historical Provider, MD   glimepiride (AMARYL) 1 MG tablet Take 1 tablet by mouth 2 times daily (with meals) If eating  Patient taking differently: Take 1 mg by mouth daily If eating 6/28/19   Estela Ortega MD       Allergies  Allergies   Allergen Reactions    Alcohol     Eggs Or Egg-Derived Products     Rubbing Alcohol [Alc-Cynara Scolymus] Hives    Ethanol Rash     Patient gets blisters       Penicillins Rash    Sulfa Antibiotics Itching and Rash       Review of Systems: Fatigued weak malaise cold chills nausea decreased appetite. ? Constitutional:  No fever chills or rigors. ? Eyes: No changes in vision, discharge, or pain  ? ENT: No Headaches, hearing loss or vertigo. No mouth sores or sore throat. No change in taste or smell. ? Cardiovascular: No chest discomfort, dyspnea on exertion, palpitations or loss of consciousness. or phlebitis. ? Respiratory: Has no cough or wheezing, Has no sputum production. Has no hemoptysis, Has no pleuritic pain, .   ? Gastrointestinal: No abdominal pain, appetite loss, blood in stools. No change in bowel habits. No hematemesis   ? Genitourinary: Patient acknowledges no dysuria, trouble voiding, or hematuria. No nocturia or increased frequency. ? Musculoskeletal: No gait disturbance, weakness or joint complaints. ? Integumentary: No rash or pruritis. ? Neurological: No headache, diplopia, change in muscle strength, numbness or tingling. No change in gait, balance, coordination, mood, affect, memory, mentation, behavior. ? Psychiatric: No anxiety, or depression. ? Endocrine: No temperature intolerance. No excessive thirst, fluid intake, or urination. No tremor. ? Hematologic/Lymphatic: No abnormal bruising or bleeding, blood clots or swollen lymph nodes. ? Allergic/Immunologic: No nasal congestion or hives. Objective  /89   Pulse 83   Temp 98.6 °F (37 °C) (Oral)   Resp 17   Ht 5' 3\" (1.6 m)   Wt 149 lb (67.6 kg)   SpO2 96%   BMI 26.39 kg/m²     Physical Exam:     General: AAO to person, place, time, in no acute distress,   Head and neck : PERRLA, EOMI . Sclera non icteric. Oropharynx : Clear  Neck: no JVD,  no adenopathy  Heart: Regular rate and regular rhythm, no murmur  Lungs: Diminished clear to auscultation   Extremities: No edema,no cyanosis, no clubbing. Abdomen: Soft, non-tender;no masses, no organomegaly  Skin:  Healing blister left leg covered. Neurologic:Cranial nerves grossly intact. No focal motor or sensory deficits .     Recent Laboratory Data-   Lab Results   Component Value Date    WBC 4.6 01/27/2021    HGB 10.2 (L) 01/27/2021    HCT 28.1 (L) 01/27/2021    MCV 91.5 01/27/2021    PLT 37 (L) 01/27/2021    LYMPHOPCT 8.7 (L) 01/27/2021    RBC 3.07 (L) 01/27/2021    MCH 33.2 01/27/2021    MCHC 36.3 (H) 01/27/2021    RDW 16.3 (H) 01/27/2021    NEUTOPHILPCT 89.6 (H) 01/27/2021    MONOPCT 1.7 (L) 01/27/2021    EOSPCT 7 (H) 10/13/2010    BASOPCT 0.0 01/27/2021    NEUTROABS 4.14 01/27/2021    LYMPHSABS 0.41 (L) 01/27/2021    MONOSABS 0.09 (L) 01/27/2021    EOSABS 0.00 (L) 01/27/2021    BASOSABS 0.00 01/27/2021       Lab Results   Component Value Date     (L) 01/27/2021    K 4.4 01/27/2021    CL 97 (L) 01/27/2021    CO2 11 (L) 01/27/2021    BUN 17 01/27/2021    CREATININE 1.8 (H) 01/27/2021    GLUCOSE 277 (H) 01/27/2021    CALCIUM 6.8 (L) 01/27/2021    PROT 5.4 (L) 01/27/2021    LABALBU 2.1 (L) 01/27/2021    BILITOT 0.5 01/27/2021    ALKPHOS 135 (H) 01/27/2021    AST 48 (H) 01/27/2021    ALT 29 01/27/2021    LABGLOM 33 01/27/2021    GFRAA 33 01/27/2021       Lab Results   Component Value Date    IRON 105 01/25/2021    TIBC 94 (L) 01/25/2021    FERRITIN 296 01/25/2021           Radiology- Ct Abdomen Pelvis Wo Contrast Additional Contrast? None    Result Date: 1/24/2021  EXAMINATION: CT OF THE ABDOMEN AND PELVIS WITHOUT CONTRAST 1/24/2021 5:59 pm TECHNIQUE: CT of the abdomen and pelvis was performed without the administration of intravenous contrast. Multiplanar reformatted images are provided for review. Dose modulation, iterative reconstruction, and/or weight based adjustment of the mA/kV was utilized to reduce the radiation dose to as low as reasonably achievable. COMPARISON: Previous examination April 20, 2017 HISTORY: ORDERING SYSTEM PROVIDED HISTORY: Concern for colitis TECHNOLOGIST PROVIDED HISTORY: Reason for exam:->Concern for colitis Additional Contrast?->None Decision Support Exception->Emergency Medical Condition (MA) What reading provider will be dictating this exam?->CRC FINDINGS: The lung bases demonstrate patchy bibasilar infiltrates and pleural effusions more on the right side concerning for pneumonia or edema. Right mastectomy is noted. Liver is decreased in attenuation likely fatty infiltrated. Gallbladder is absent. Spleen, pancreas, the adrenals and the kidneys are normal.  There is collapsed stomach with wall thickening. Degenerative changes are identified in the  lumbar spine with the some developing sclerotic lesion on T10 concerning for bone metastasis. Pelvis. Bladder is partially distended with Nagy catheter. There is diffuse wall thickening and edema of the ascending colon and transverse colon. There is mild thickening of the left hemicolon with scattered diverticulosis. A large amount of ascites is present in the pelvis probably inflammatory. There is a decubitus ulcer in the left gluteus region. Diffuse wall thickening and edema of the ascending and transverse colon concerning for colitis. There is a large amount of inflammatory ascites in the pelvis. Atelectasis/infiltrates and pleural effusion lung bases likely pneumonia or edema. Collapsed stomach with wall thickening. Gastritis is a consideration. Possible decubitus ulcer in the left gluteus region and sclerotic lesion on T10 concerning for developing bone metastasis. Ct Head Wo Contrast    Result Date: 1/24/2021  EXAMINATION: CT OF THE HEAD WITHOUT CONTRAST  1/24/2021 4:16 pm TECHNIQUE: CT of the head was performed without the administration of intravenous contrast. Dose modulation, iterative reconstruction, and/or weight based adjustment of the mA/kV was utilized to reduce the radiation dose to as low as reasonably achievable. COMPARISON: CT head without contrast, August 15, 2018 HISTORY: ORDERING SYSTEM PROVIDED HISTORY: [facility] TECHNOLOGIST PROVIDED HISTORY: Has a \"code stroke\" or \"stroke alert\" been called? ->No Reason for exam:->ams What reading provider will be dictating this exam?->CRC FINDINGS: BRAIN/VENTRICLES: No mass effect, edema or hemorrhage is seen. Mild volume loss is seen in the cerebrum with mild chronic microvascular ischemic changes. No hydrocephalus or extra-axial fluid is seen. ORBITS: Prosthetic lenses are seen in the globes bilaterally. The orbits are otherwise grossly unremarkable. SINUSES: The visualized paranasal sinuses and mastoid air cells demonstrate no acute abnormality. SOFT TISSUES/SKULL:  No acute abnormality of the visualized skull or soft tissues. No acute intracranial abnormality.     Ct Chest Wo Contrast    Result Date: 1/24/2021 EXAMINATION: CT OF THE CHEST WITHOUT CONTRAST 1/24/2021 4:16 pm TECHNIQUE: CT of the chest was performed without the administration of intravenous contrast. Multiplanar reformatted images are provided for review. Dose modulation, iterative reconstruction, and/or weight based adjustment of the mA/kV was utilized to reduce the radiation dose to as low as reasonably achievable. COMPARISON: None. HISTORY: ORDERING SYSTEM PROVIDED HISTORY: [employer], TECHNOLOGIST PROVIDED HISTORY: Reason for exam:->Hypoixa, What reading provider will be dictating this exam?->CRC FINDINGS: Lines and tubes:  None. Lungs and Airways and Pleura:  Central airways are patent. Moderate right pleural effusion with associated dependent atelectatic changes of the right lung base. No pneumothorax. There is a 22 mm spiculated nodule within the lateral aspect of the left upper lobe adjacent to the left major fissure extending into the pleural surface. The nodule also demonstrates central cavitation. There is moderate centrilobular emphysematous changes of the lungs. Lymph nodes: No pathologically enlarged mediastinal, hilar, lower cervical, or chest wall lymph nodes. Cardiovascular and Mediastinum:  Cardiac chamber sizes appear to measure within normal limits on this non ECG gated study. No pericardial effusion. Bones/Soft tissues:  No definite suspicious lytic or blastic foci. Upper abdomen: In the visualized part of upper abdomen there is suggestion of significant colonic wall thickening of hepatic flexure with extensive amount of surrounding fatty stranding highly concerning for an underlying colitis. Calcification within the spleen. Finding is likely sequela to prior granulomatous disease exposure. Marleny Barbara 22 mm spiculated nodule within the lateral aspect of the left upper lobe with central cavitation. The lesion is concerning for underlying lung malignancy. Considering the size and appearance either tissue sampling or PET-CT examination can be performed Moderate centrilobular emphysematous changes of the lungs. Moderate right pleural effusion and atelectatic changes of right lung base. Significant colonic wall thickening with surrounding fatty stranding is highly concerning for colitis. For further evaluation CT of the abdomen can be obtained. Xr Chest Portable    Result Date: 1/24/2021  EXAMINATION: ONE XRAY VIEW OF THE CHEST 1/24/2021 1:23 pm COMPARISON: August 15, 2019 HISTORY: ORDERING SYSTEM PROVIDED HISTORY: Hypoglycemia, hypoxia TECHNOLOGIST PROVIDED HISTORY: Reason for exam:->Hypoglycemia, hypoxia What reading provider will be dictating this exam?->CRC FINDINGS: Heart and the mediastinal structures are normal.  There is COPD with atelectasis in the lung bases. degenerative changes are noted in the shoulders. Stable nonacute chest with atelectasis in the lung bases. ASSESSMENT/PLAN :    58-year-old female    GERD, COPD, HTN, neuropathy, and diabetes. Stage IV breast cancer with skeletal metastasis, ER/[place] positive and HER-2/donald negative. Status post a right mastectomy and is currently on Ibrance/Faslodex and Xgeva. She received cycle 7 of Faslodex on 1/5. CTA chest showed 22 mm spiculated nodule within the lateral aspect of the left upper lobe with central cavitation. Centrilobular emphysema also noted  CBC showed for pancytopenia. Fibrinogen 97.   CT A/P showed sclerotic lesion on T10 concerning for developing bone metastasis. Significant colonic wall thickening suspicious for colitis. Her pancytopenia is related to myelosuppression by Ibrance  Her anemia is also contributed to by chronic kidney disease. Her leukopenia is predominantly related to lymphopenia. Her thrombocytopenia is related to myelosuppression by Ibrance    There is no clinical suggestion of DIC. Her moderately decreased fibrinogen and slightly prolonged PTT is related to liver disease hypoalbuminemia with an abnormal inseminations. To check iron studies, reticulocyte count, peripheral blood smear review B12 and folate and serum LDH. We will transfuse with 1 unit of packed RBC in view of her hypoxia and to improve oxygen carrying capacity. We will discuss with family CT-guided biopsy of spiculated lung lesion to exclude a primary lung neoplasm    1/26/21  - Hospice consulted for information only  - Hgb improved s/p transfusion and WBC stable, further fall in platelet count to 22  - Iron profile reviewed. Adequate  - On folic acid  - If patient wishes to proceed with lung biopsy, likely need to transfuse 2 unit platelets prior to procedure (should finish 1 hour prior to procedure)  - Plans for IVCF 1/27 due to thrombocytopenia and LLE DVT    1/27/21  -She is status post IVC filter placement   -Platelets are slightly improved to 37 today  -Peripheral smear showed platelets present in decreased numbers and display normal morphology. Red cells are normocytic normochromic with moderately anisopoikilocytosis including occasional target cells, ovalocytes, and scattered hypochromic cells and rare nucleated red blood cells. White blood cells are morphologically and quantitatively unremarkable with exception of moderate lymphopenia. -Reticulocyte panel abnormal.  Thrombocytopenia is multifactorial and related to myelosuppression by [place] [facility] and also contributed to by consumption by her extensive DVT  -LDH WNL  -Vitamin B12 is high and folate is low, she is on folic acid. - If patient wishes to proceed with lung biopsy, likely need to transfuse 2 unit platelets prior to procedure (should finish 1 hour prior to procedure).      DEANNA Rudolph - CNP  Electronically signed 1/27/2021 at 2:11 PM

## 2021-01-27 NOTE — ANESTHESIA POSTPROCEDURE EVALUATION
Department of Anesthesiology  Postprocedure Note    Patient: Sonido Rowell  MRN: [id number]  [place]: 1940  Date of evaluation: 1/27/2021  Time:  10:05 AM     Procedure Summary     Date: 01/27/21 Room / Location: [facility] OR 04 / [facility]    Anesthesia Start: 2244 Anesthesia Stop: 3947    Procedure: VENA CAVA FILTER INSERTION (N/A ) Diagnosis: (.)    Surgeons: Lisa Orozco MD Responsible Provider: Cuauhtemoc Carvajal MD    Anesthesia Type: MAC ASA Status: 4          Anesthesia Type: MAC    Nathaniel Phase I:      Nathaniel Phase II:      Last vitals: Reviewed and per EMR flowsheets.        Anesthesia Post Evaluation    Patient location during evaluation: PACU  Patient participation: complete - patient participated  Level of consciousness: awake  Pain score: 0  Airway patency: patent  Nausea & Vomiting: no nausea  Complications: no  Cardiovascular status: hemodynamically stable  Respiratory status: acceptable  Hydration status: stable

## 2021-01-27 NOTE — PROGRESS NOTES
[address] [facility]   Department of Internal Medicine   Internal Medicine Residency  MICU Progress Note    Patient:  Saurav Lobato 79 y.o. female   MRN: [id number]       Date of Service: 2021    Allergy: Alcohol, Eggs or egg-derived products, Rubbing alcohol [alc-cynara scolymus], Ethanol, Penicillins, and Sulfa antibiotics    Subjective     Saw and examined the patient and them today after she had the IVC filter placement. Review of system was unremarkable. Objective     TEMPERATURE:  Current - Temp: 98.4 °F (36.9 °C); Max - Temp  Av.8 °F (37.1 °C)  Min: 97.8 °F (36.6 °C)  Max: 99.5 °F (37.5 °C)    RESPIRATIONS RANGE: Resp  Av.1  Min: 0  Max: 28    PULSE RANGE: Pulse  Av.1  Min: 81  Max: 107    BLOOD PRESSURE RANGE:  Systolic (39HYD), ZRD:116 , Min:98 , ALU:448   ; Diastolic (10KXS), PNE:63, Min:60, Max:107      PULSE OXIMETRY RANGE: SpO2  Av.6 %  Min: 91 %  Max: 100 %    Physical Exam:  · I & O - 24hr: I/O this shift:  · In: 120 [P.O.:120]  · Out: 45 [Urine:45]   · General Appearance: alert  · HEENT:  Head: Normocephalic, no lesions, without obvious abnormality. · Neck: no adenopathy, no carotid bruit, no JVD, supple, symmetrical, trachea midline and thyroid not enlarged, symmetric, no tenderness/mass/nodules  · Lung: clear to auscultation bilaterally  · Heart: regular rate and rhythm, S1, S2 normal, no murmur, click, rub or gallop  · Abdomen: soft, non-tender; bowel sounds normal; no masses,  no organomegaly  · Extremities:  extremities normal, atraumatic, no cyanosis or edema  · Musculokeletal: No joint swelling, no muscle tenderness. ROM normal in all joints of extremities.    Neurologic: Mental status: Alert and oriented    Medications     Continuous Infusions:   sodium chloride      sodium chloride      dextrose       Scheduled Meds:   hydrocortisone sodium succinate PF  25 mg Intravenous Q8H    ipratropium-albuterol  1 ampule Inhalation Q4H WA  folic acid  1 mg Intravenous Daily    Mineral Oil-Hydrophil Petrolat   Topical BID    cefTRIAXone (ROCEPHIN) IV  1,000 mg Intravenous Q24H    metroNIDAZOLE  500 mg Intravenous Q8H    insulin lispro  0-6 Units Subcutaneous Q4H    latanoprost  1 drop Left Eye QPM    brimonidine  1 drop Left Eye QAM    [Held by provider] gabapentin  300 mg Oral BID    [Held by provider] gabapentin  400 mg Oral Nightly    pantoprazole  40 mg Oral Daily    cetirizine  10 mg Oral Daily    [Held by provider] verapamil  180 mg Oral BID    budesonide  0.25 mg Nebulization BID    And    Arformoterol Tartrate  15 mcg Nebulization BID     PRN Meds: sodium chloride, sodium chloride, albuterol, glucose, dextrose, glucagon (rDNA), dextrose  Nutrition:   General diet    Labs and Imaging Studies     CBC:   Recent Labs     01/25/21  1856 01/26/21  0459 01/27/21  0402   WBC 2.3* 2.4* 4.6   RBC 2.18* 2.41* 3.07*   HGB 7.1* 8.0* 10.2*   HCT 20.4* 23.6* 28.1*   MCV 93.6 97.9 91.5   MCH 32.6 33.2 33.2   MCHC 34.8* 33.9 36.3*   RDW 18.1* 17.4* 16.3*   PLT 37* 22* 37*   MPV NOT CALC NOT CALC NOT CALC       BMP:    Recent Labs     01/26/21  0459 01/26/21  1256 01/27/21  0402   * 128* 126*   K 3.5 4.2 4.4   CL 96* 100 97*   CO2 19* 20* 11*   BUN 17 16 17   CREATININE 1.8* 1.7* 1.8*   GLUCOSE 379* 160* 277*   CALCIUM 6.5* 6.6* 6.8*   PROT 4.4*  --  5.4*   LABALBU 1.7*  --  2.1*   BILITOT 0.4  --  0.5   ALKPHOS 117*  --  135*   AST 50*  --  48*   ALT 23  --  29       LIVER PROFILE:   Recent Labs     01/26/21  0459 01/27/21  0402   AST 50* 48*   ALT 23 29   BILITOT 0.4 0.5   ALKPHOS 117* 135*       PT/INR:   Recent Labs     01/26/21  0459 01/27/21  0402   PROTIME 20.6* 19.5*   INR 1.8 1.7       APTT:   Recent Labs     01/24/21  1829 01/27/21  0402   APTT 41.3* 41.5*       Fasting Lipid Panel:    Lab Results   Component Value Date    CHOL 105 06/08/2018    TRIG 145 06/08/2018    HDL 34 06/08/2018       Cardiac Enzymes:    Lab Results Component Value Date    CKTOTAL 111 01/08/2014    CKTOTAL 115 01/08/2014    CKTOTAL 138 01/07/2014    CKMB 2.3 01/08/2014    CKMB 2.1 01/08/2014    CKMB 1.9 01/07/2014    TROPONINI <0.01 01/26/2021    TROPONINI <0.01 01/24/2021    TROPONINI <0.01 06/26/2019       Notable Cultures:      Blood cultures   Blood Culture, Routine   Date Value Ref Range Status   01/24/2021 24 Hours no growth  Preliminary     Respiratory cultures No results found for: RESPCULTURE No results found for: LABGRAM  Urine   Urine Culture, Routine   Date Value Ref Range Status   03/22/2016 <10,000 CFU/mL  Mixed gram positive organisms    Final     Legionella No results found for: LABLEGI  C Diff PCR No results found for: CDIFPCR  Wound culture/abscess: No results for input(s): WNDABS in the last 72 hours. Tip culture:No results for input(s): CXCATHTIP in the last 72 hours.   Oxygen:     Vent Information  Skin Assessment: Clean, dry, & intact  FiO2 : (S) 60 %  SpO2: 94 %  SpO2/FiO2 ratio: 83  Mask Type: Full face mask  Mask Size: Small    Additional Respiratory  Assessments  Pulse: 82  Resp: 15  SpO2: 94 %  Oral Care: Mouth swabbed   Urethral Catheter-Output (mL): 25 mL    Imaging Studies:    Ct Abdomen Pelvis Wo Contrast Additional Contrast? None    Result Date: 1/24/2021 EXAMINATION: CT OF THE ABDOMEN AND PELVIS WITHOUT CONTRAST 1/24/2021 5:59 pm TECHNIQUE: CT of the abdomen and pelvis was performed without the administration of intravenous contrast. Multiplanar reformatted images are provided for review. Dose modulation, iterative reconstruction, and/or weight based adjustment of the mA/kV was utilized to reduce the radiation dose to as low as reasonably achievable. COMPARISON: Previous examination April 20, 2017 HISTORY: ORDERING SYSTEM PROVIDED HISTORY: Concern for colitis TECHNOLOGIST PROVIDED HISTORY: Reason for exam:->Concern for colitis Additional Contrast?->None Decision Support Exception->Emergency Medical Condition (MA) What reading provider will be dictating this exam?->CRC FINDINGS: The lung bases demonstrate patchy bibasilar infiltrates and pleural effusions more on the right side concerning for pneumonia or edema. Right mastectomy is noted. Liver is decreased in attenuation likely fatty infiltrated. Gallbladder is absent. Spleen, pancreas, the adrenals and the kidneys are normal.  There is collapsed stomach with wall thickening. Degenerative changes are identified in the  lumbar spine with the some developing sclerotic lesion on T10 concerning for bone metastasis. Pelvis. Bladder is partially distended with Nagy catheter. There is diffuse wall thickening and edema of the ascending colon and transverse colon. There is mild thickening of the left hemicolon with scattered diverticulosis. A large amount of ascites is present in the pelvis probably inflammatory. There is a decubitus ulcer in the left gluteus region. EXAMINATION: CT OF THE CHEST WITHOUT CONTRAST 1/24/2021 4:16 pm TECHNIQUE: CT of the chest was performed without the administration of intravenous contrast. Multiplanar reformatted images are provided for review. Dose modulation, iterative reconstruction, and/or weight based adjustment of the mA/kV was utilized to reduce the radiation dose to as low as reasonably achievable. COMPARISON: None. HISTORY: ORDERING SYSTEM PROVIDED HISTORY: Hypoixa, TECHNOLOGIST PROVIDED HISTORY: Reason for exam:->Hypoixa, What reading provider will be dictating this exam?->CRC FINDINGS: Lines and tubes:  None. Lungs and Airways and Pleura:  Central airways are patent. Moderate right pleural effusion with associated dependent atelectatic changes of the right lung base. No pneumothorax. There is a 22 mm spiculated nodule within the lateral aspect of the left upper lobe adjacent to the left major fissure extending into the pleural surface. The nodule also demonstrates central cavitation. There is moderate centrilobular emphysematous changes of the lungs. Lymph nodes: No pathologically enlarged mediastinal, hilar, lower cervical, or chest wall lymph nodes. Cardiovascular and Mediastinum:  Cardiac chamber sizes appear to measure within normal limits on this non ECG gated study. No pericardial effusion. Bones/Soft tissues:  No definite suspicious lytic or blastic foci. Upper abdomen: In the visualized part of upper abdomen there is suggestion of significant colonic wall thickening of hepatic flexure with extensive amount of surrounding fatty stranding highly concerning for an underlying colitis. Calcification within the spleen. Finding is likely sequela to prior granulomatous disease exposure. Talita Plata 22 mm spiculated nodule within the lateral aspect of the left upper lobe with central cavitation. The lesion is concerning for underlying lung malignancy. Considering the size and appearance either tissue sampling or PET-CT examination can be performed Moderate centrilobular emphysematous changes of the lungs. Moderate right pleural effusion and atelectatic changes of right lung base. Significant colonic wall thickening with surrounding fatty stranding is highly concerning for colitis. For further evaluation CT of the abdomen can be obtained. Xr Chest Portable    Result Date: 1/26/2021  EXAMINATION: ONE XRAY VIEW OF THE CHEST 1/26/2021 2:27 am COMPARISON: 01/24/2021 HISTORY: ORDERING SYSTEM PROVIDED HISTORY: resp failure TECHNOLOGIST PROVIDED HISTORY: Reason for exam:->resp failure What reading provider will be dictating this exam?->CRC FINDINGS: There is no cardiomegaly, pulmonary vascular congestion, acute infiltrate, pleural effusion or pneumothorax. No acute abnormality identified. Xr Chest Portable    Result Date: 1/24/2021  EXAMINATION: ONE XRAY VIEW OF THE CHEST 1/24/2021 1:23 pm COMPARISON: August 15, 2019 HISTORY: ORDERING SYSTEM PROVIDED HISTORY: Hypoglycemia, hypoxia TECHNOLOGIST PROVIDED HISTORY: Reason for exam:->Hypoglycemia, hypoxia What reading provider will be dictating this exam?->CRC FINDINGS: Heart and the mediastinal structures are normal.  There is COPD with atelectasis in the lung bases. degenerative changes are noted in the shoulders. Stable nonacute chest with atelectasis in the lung bases.     Fluoro For Surgical Procedures    Result Date: 1/27/2021 EXAMINATION: SPOT FLUOROSCOPIC IMAGES 2021 8:41 am TECHNIQUE: Fluoroscopy was provided by the radiology department for procedure. Radiologist was not present during examination. FLUOROSCOPY DOSE AND TYPE OR TIME AND EXPOSURES: Fluoroscopy time equals 76.8 seconds. Total dose equals 23.49 mGy COMPARISON: None HISTORY: ORDERING SYSTEM PROVIDED HISTORY: vena cava filter TECHNOLOGIST PROVIDED HISTORY: Reason for exam:->vena cava filter What reading provider will be dictating this exam?->CRC Intraprocedural imaging. FINDINGS: 1 spot images of the abdomen were obtained. Intraprocedural fluoroscopic spot images as above. See separate procedure report for more information. Us Dup Lower Extremities Bilateral Venous    Result Date: 2021  Patient MRN:  [id number] : 1940 Age: 79 years Gender: Female Order Date:  2021 8:12 AM EXAM: US DUP LOWER EXTREMITIES BILATERAL VENOUS NUMBER OF IMAGES:  48 INDICATION:  rule out DVT rule out DVT What reading provider will be dictating this exam?->[facility] COMPARISON: None There is evidence for deep venous thrombosis in the left iliac, common femoral vein and superficial vein and the popliteal veins There is otherwise good compressibility, there is good augmentation, there is good color flow. There is evidence for deep venous thrombosis, on the left ALERT:  THIS IS AN ABNORMAL REPORT      EKG: Rhythm Strip: normal sinus rhythm, unchanged from previous tracings. Resident's Assessment and PLan     Assessment:     1. Hypoglycemia,  due to sulfonylurea toxicity in the setting of low kidney clearance, currently off D10 drip  2. Acute hypoxic respiratory failure, in the setting of low hemoglobin decreased oxygen carrying capacity, not on any home oxygen, was on 4 L NC on admission, possibly secondary to pulmonary embolism.   3. Left lower extremity deep venous thrombosis, status post IVC filter placement today 4. Stage IV breast cancer, ER/NY positive and HER-2/donald negative, heme-onc following, s/p right mastectomy, on chemotherapy, with possible bone mets. Hematology oncology following  5. Pancytopenia, likely related to myelosuppression by chemotherapy medications  6. Anemia of chronic disease in the setting of CKD, ferritin 296, iron 105, iron sat 112, TIBC 94, hemoglobin 7.1 upon transfer to the ICU,  7. Colitis, noted on CT A/P, on ceftriaxone and Flagyl  8. 22 mm spiculated lung nodule in left upper lobe, concerning for malignancy, discussion being held regarding CT-guided biopsy  9. COPD, continue breathing treatments  10. DM2, on low-dose sliding scale  11. Neuropathy, holding gabapentin due to AMS        Plan:     · Continue ceftriaxone and metronidazole  · Wean hydrocortisone to 25 mg every 8 hours  · Start low-dose sliding scale  · Continue folate supplements  · Plan to transfer to intermediate care today  · Start general diet  · PT OT assessment    Core measures:  1. Code status: DNR CCA    2. Peptic ulcer prophylaxis: PPI    3. DVT Prophylaxis: PCD    4. Lines / tubes: Peripherals    5. Disposition: Transfer to intermediate care    Gray Little M.D. Internal Medicine Resident - PGY2    Attending Physician: Dr. Steffany Lassiter personally saw, examined and provided care for the patient. Radiographs, labs and medication list were reviewed by me independently. I spoke with bedside nursing, therapists and consultants. Critical care services and times documented are independent of procedures and multidisciplinary rounds with Residents. Additionally comprehensive, multidisciplinary rounds were conducted with the MICU team. The case was discussed in detail and plans for care were established. Review of Residents documentation was conducted and revisions were made as appropriate. I agree with the above documented exam, problem list and plan of care.     Jeremi Marshall M.D. Pulmonary/Critical Care Medicine   31 min cct excluding procedures

## 2021-01-27 NOTE — CARE COORDINATION
1/27/2021 Care Coordination:  Contacted by patient's Eugenenora Perezett [phone] who reported patient does not have services/aides through [employer] as her daughter Patricia Martini relayed. Services were discontinued several months ago because patient did not qualify. Attempted to reach Patricia Martini [phone] to assist with transition of care. Recorded a message requesting a return call.   PT/OT evaluations indicate patient is quite dependent and

## 2021-01-27 NOTE — PROGRESS NOTES
Palliative Care Department  [phone]  Palliative Care Progress Note  Provider Lala Shearer  [id number]  Hospital Day: 4    Referring Provider: Steffany Mckeon MD  Palliative Medicine was consulted for assistance with: Goals of care    CHIEF COMPLAINT: Mental status and hypoglycemia  Brief summary: 56-year-old female with metastatic breast cancer on oral chemotherapy. ASSESSMENT/PLAN:     Pertinent hospital diagnoses:  · Metastatic breast cancer  -Oncology consulted  -Ibrance, oral medication daily    · Thrombocytopenia/leukopenia  -Likely secondary to chemotherapy  -Trend labs    ·  Cavitary lung mass   -Pulmonology consulted    PALLIATIVE CARE ENCOUNTER  - Outcome of goals of care meeting:   -Consult hospice for information only  - Capacity: At this time, Linnette Mcdonald, Does have capacity for medical decision-making. Capacity is time limited and situation/question specific  - Surrogate decision maker/Legal NOK:    -Yuniel Silva [phone]  - Code Status:   DNR-CCA  - Advanced directives: On chart  - Spiritual assessment: No needs identified  - Bereavement and grief: None identified    - DISPO: Continued treatment, hospice consult    Referrals to: Hospice    SUBJECTIVE:   Linnette Mcdonald is a 79 y.o. with a past medical history of diabetes mellitus, GERD, COPD, metastatic breast cancer, hypertension, neuropathy, arthritis, glaucoma who presented to the emergency department from home with altered mental status and hypoglycemia.       Patient completed workup in the ED and was admitted on 1/24/2021 CT of chest revealed moderate centrilobular emphysema, 8 2.2 cm spiculated nodule with central cavitation and a moderate right pleural effusion. Additionally colonic thickening with surrounding fatty stranding is concerning for colitis. CT of the abdomen and pelvis revealed a large amount of ascites in the pelvis, decubitus ulcer in the left gluteus region and a sclerotic lesion on T10 concerning for developing bone metastasis. Was elevated to 1.7. Lactic acid elevated to 2.5. Albumin decreased to 2.1. RRT was called due to altered mental status and bradycardia patient was treated for hypoglycemia with improvement. White count was decreased overall to 2.8. H/H 8.3/23. 2. Platelets decreased at 50,000. Comparison to previous labs patient persistently leukopenic however platelet count has been variable. Patient is on Ibrance orally for metastatic breast cancer. DETAILS OF CONVERSATION:  Patient awake, continues to complain of feeling cold. Denies pain. Patient asking if biopsy results are back yet. When asked patient about d/c plans, her goal would be to go home with hospice if she biopsy shows metastatic cancer. Spoke with hospice liaison who is going to try to reach out to daughter again today to provide information regarding hospice and determine d/c plan.     OBJECTIVE:   Prognosis: Poor    Physical Exam:  /87   Pulse 87   Temp 99.3 °F (37.4 °C) (Axillary)   Resp 14   Ht 5' 3\" (1.6 m)   Wt 149 lb (67.6 kg)   SpO2 97%   BMI 26.39 kg/m²   Gen:  Elderly, thin, multiple blankets heaped on her  HEENT:  Normocephalic, atraumatic, mucosa dry, sclera anicteric  Neck:  Supple, trachea midline  Lungs:  respirations unlabored  Heart[de-identified]  RRR, normal S1S2, no murmur/rub or gallop  Abd:  Soft, non tender, non distended, bowel sounds present, left upper quadrant scar  Ext:  Moving all extremities, muscular atrophy of lower extremities, no edema, pulses present Skin:  Xerotic skin, without rash, bruising, petechiae  Neuro: alert, oriented, no focal deficit    Objective data reviewed: labs, images, records, medication use, vitals and chart  Relevant data: Per HPI    Time/Communication  Greater than 50% of time spent, total 15 minutes in counseling and coordination of care at the bedside/over the telephone regarding goals of care, symptom management, diagnosis and prognosis and see above. Thank you for allowing Palliative Medicine to participate in the care of Sherrill Marcano.       Provider [facility]

## 2021-01-27 NOTE — PROGRESS NOTES
Physical Therapy    Pt on PT caseload. Off unit in AM for IVC filter placement. Pt had just returned to room upon speaking with nursing staff. Will attempt back at a later time. Thank you.     Cj Farrar, PT, DPT  [id number]

## 2021-01-27 NOTE — OP NOTE
Operative Note      Patient: Pat Burkitt  YOB: 1940  MRN: [id number]    Date of Procedure: 1/27/2021    Pre-Op Diagnosis: 1. DVT left iliofemoral venous system with severe thrombocytopenia and pancytopenia    Post-Op Diagnosis: Same       Procedure(s):  VENA CAVA FILTER INSERTION    Surgeon(s):  Kanwal Parish MD    Assistant:   Surgical Assistant: Jennifer Mota    Anesthesia: Monitor Anesthesia Care    Estimated Blood Loss (mL): Minimal    Complications: None    Specimens:   * No specimens in log *    Implants:  * No implants in log *      Drains:   Urethral Catheter (Active)   Catheter Indications Need for fluid management in critically ill patients in a critical care setting not able to be managed by other means such as BSC with hat, bedpan, urinal, condom catheter, or short term intermittent urethral catherization 01/27/21 0600   Urine Color Tea 01/27/21 0600   Urine Appearance Sediment 01/27/21 0600   Output (mL) 15 mL 01/27/21 0600       Findings:     Detailed Description of Procedure:         PROCEDURE:   With the patient in the supine position, both groins were   prepped and draped in the usual fashion after preliminary ultrasound scanning   was performed. Then, 1% Xylocaine was used for local anesthesia. The right femoral vein was cannulated percutaneously, after ultrasound imaging and on top of a   guidewire, the Cook vena cava filter was introduced into the vena cava where   contrast injected revealed satisfactory vena cava anatomy, and the filter was   released at the level of the second lumbar vertebra in a good   location after which the sheath was removed. Pressure was held at the   puncture site to control bleeding. Hemostasis was secured. The puncture   site was dressed with a Band-Aid, and the patient was transferred back to the   floor in a stable condition.   Blood loss was minimal.

## 2021-01-27 NOTE — PROGRESS NOTES
Subjective: The patient is in ICU  Blood sugars now markedly elevated   All vitals stable   No acute events       Objective:    BP (!) 134/92   Pulse 81   Temp 97.8 °F (36.6 °C) (Oral)   Resp 14   Ht 5' 3\" (1.6 m)   Wt 149 lb (67.6 kg)   SpO2 100%   BMI 26.39 kg/m²     In: 1104 [P.O.:360; I.V.:494; Blood:250]  Out: 356   In: 1104   Out: 356 [Urine:355]    General appearance: NAD, conversant  HEENT: AT/NC, MMM  Neck: FROM, supple  Lungs: Clear to auscultation  CV: RRR, no MRGs  Vasc: Radial pulses 2+  Abdomen: Soft, non-tender; no masses or HSM  Extremities: No peripheral edema or digital cyanosis  Skin: no rash, lesions or ulcers  Psych: Alert and oriented to person, place and time  Neuro: Alert and interactive     Recent Labs     01/25/21  1856 01/26/21  0459 01/27/21  0402   WBC 2.3* 2.4* 4.6   HGB 7.1* 8.0* 10.2*   HCT 20.4* 23.6* 28.1*   PLT 37* 22* 37*       Recent Labs     01/26/21  0459 01/26/21  1256 01/27/21  0402   * 128* 126*   K 3.5 4.2 4.4   CL 96* 100 97*   CO2 19* 20* 11*   BUN 17 16 17   CREATININE 1.8* 1.7* 1.8*   CALCIUM 6.5* 6.6* 6.8*       Assessment:    Principal Problem:    Failure to thrive in adult  Active Problems:    Stage IV breast cancer in female ([facility] [address]    HTN (hypertension), benign    Diabetes mellitus type 2, uncontrolled (HCC)    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  *      Plan:  Admit to general medical floor for evaluation of failure to thrive/hypoglycemia in patient undergoing chemotherapy for metastatic breast cancer now with bony mets as well as mets to the lungs    hypoglycemia now resolved with d10 - blood sugars elevated - resume iss   hypotension resolved - start solu cortef - better   Encourage p.o. intake  Discontinue sulfonylureas  Discontinue negative chronotropic agents  monitor blood sugars every 6 hours    Spiculated lung lesion concerning for primary lung CA, patient with known history of metastatic breast cancer  Await possible IR directed biopsy  Possible metastatic disease from the breast  as well  Hematology oncology following    Questionable colitis on CT abdomen pelvis  Continue Rocephin and Flagyl for now  If patient remains afebrile without symptoms, antibiotics can be discontinued     Pancytopenia  Likely secondary to chemotherapy/immunocompromised host  Continue to monitor   Transfuse if hemoglobin less than 7  Platelet transfusion if platelet count drops below 15,000    Extensive DVT left leg   S/o IVC filter placed 1/27 as pt cannot be anticoagulated dt pancytopenia    Palliative care evaluation to delineate goals of care  im not sure why she is in icu     Uncontrolled blood sugars  Stop d10   iss   lantus     DVT Prophylaxis   PT/OT  Discharge Canelo Adan MD  4:51 PM  1/27/2021

## 2021-01-28 NOTE — PROGRESS NOTES
Subjective:    Out of ICU  Awake alert and feels better  Still weak but improved over the past few days      Objective:    /85   Pulse 99   Temp 98.2 °F (36.8 °C) (Temporal)   Resp 18   Ht 5' 3\" (1.6 m)   Wt 149 lb (67.6 kg)   SpO2 98%   BMI 26.39 kg/m²     In: 630 [P.O.:480; I.V.:150]  Out: 480   In: 630   Out: 480 [Urine:480]    General appearance: NAD, conversant  HEENT: AT/NC, MMM  Neck: FROM, supple  Lungs: Clear to auscultation  CV: RRR, no MRGs  Vasc: Radial pulses 2+  Abdomen: Soft, non-tender; no masses or HSM  Extremities: No peripheral edema or digital cyanosis  Skin: no rash, lesions or ulcers  Psych: Alert and oriented to person, place and time  Neuro: Alert and interactive     Recent Labs     01/26/21  0459 01/27/21  0402 01/28/21  0648   WBC 2.4* 4.6 3.8*   HGB 8.0* 10.2* 9.2*   HCT 23.6* 28.1* 25.9*   PLT 22* 37* 16*       Recent Labs     01/26/21  1256 01/27/21  0402 01/28/21  0648   * 126* 134   K 4.2 4.4 4.7    97* 100   CO2 20* 11* 15*   BUN 16 17 19   CREATININE 1.7* 1.8* 1.6*   CALCIUM 6.6* 6.8* 7.2*       Assessment:    Principal Problem:    Failure to thrive in adult  Active Problems:    Stage IV breast cancer in female ([facility] [address]    HTN (hypertension), benign    Diabetes mellitus type 2, uncontrolled (HCC)    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  *      Plan:  Admit to general medical floor for evaluation of failure to thrive/hypoglycemia in patient undergoing chemotherapy for metastatic breast cancer now with bony mets as well as mets to the lungs     Insulin sliding scale as hypoglycemia resolved Wean down Solu-Cortef and start midodrine  Encourage p.o. intake  Discontinue sulfonylureas  Discontinue negative chronotropic agents  monitor blood sugars every 6 hours    Spiculated lung lesion concerning for primary lung CA, patient with known history of metastatic breast cancer  Await possible IR directed biopsy-?   Not yet donewe will consult IR as family is agreeable to have this done  Possible metastatic disease from the breast  as well  Hematology oncology following    Questionable colitis on CT abdomen pelvis  Continue Rocephin and Flagyl for now  If patient remains afebrile without symptoms, antibiotics can be discontinued     Pancytopenia  Likely secondary to chemotherapy/immunocompromised host  Continue to monitor   Transfuse if hemoglobin less than 7  Platelet transfusion if platelet count drops below 15,000  H&H improved with blood products    Extensive DVT left leg   S/o IVC filter placed 1/27 as pt cannot be anticoagulated dt pancytopenia        Palliative care evaluation to delineate goals of care  Hospice consult for information      Uncontrolled blood sugars  Stop d10   iss    lantus     DVT Prophylaxis   PT/OT  Discharge Maximino Agosto MD  10:48 AM  1/28/2021

## 2021-01-28 NOTE — PROGRESS NOTES
[facility]  [address], [address]  [facility], [facility]      Pulmonary Progress Note      SUBJECTIVE:  Currently awake on RA, transferred out of ICU, claims ok with breathing, denies cough/congestion, looking comfortable with breathing. IVC filter placed yesterday, no lung nodule biopsy due to thrombocytopenia and thrombocytopenia and suggestion was to get PET scan.     OBJECTIVE    Medications    Continuous Infusions:   sodium chloride      sodium chloride      sodium chloride      dextrose         Scheduled Meds:   hydrocortisone sodium succinate PF  25 mg Intravenous Q8H    ipratropium-albuterol  1 ampule Inhalation L8R WA    folic acid  1 mg Intravenous Daily    Mineral Oil-Hydrophil Petrolat   Topical BID    cefTRIAXone (ROCEPHIN) IV  1,000 mg Intravenous Q24H    metroNIDAZOLE  500 mg Intravenous Q8H    insulin lispro  0-6 Units Subcutaneous Q4H    latanoprost  1 drop Left Eye QPM    brimonidine  1 drop Left Eye QAM    [Held by provider] gabapentin  300 mg Oral BID    [Held by provider] gabapentin  400 mg Oral Nightly    pantoprazole  40 mg Oral Daily    cetirizine  10 mg Oral Daily    [Held by provider] verapamil  180 mg Oral BID    budesonide  0.25 mg Nebulization BID    And    Arformoterol Tartrate  15 mcg Nebulization BID       PRN Meds:morphine, aluminum & magnesium hydroxide-simethicone, trimethobenzamide, sodium chloride, sodium chloride, sodium chloride, albuterol, glucose, dextrose, glucagon (rDNA), dextrose    Physical    VITALS:  /65   Pulse 109   Temp 97.5 °F (36.4 °C) (Temporal)   Resp 18   Ht 5' 3\" (1.6 m)   Wt 149 lb (67.6 kg)   SpO2 98%   BMI 26.39 kg/m²     24HR INTAKE/OUTPUT:      Intake/Output Summary (Last 24 hours) at 1/28/2021 1646  Last data filed at 1/28/2021 1459  Gross per 24 hour   Intake 750 ml   Output 435 ml   Net 315 ml       24HR PULSE OXIMETRY RANGE: SpO2  Av.9 %  Min: 94 %  Max: 100 %    General appearance: alert, appears stated age and cooperative  Lungs: rhonchi bibasilar minimal  Heart: regular rate and rhythm, S1, S2 normal, no murmur, click, rub or gallop  Abdomen: soft, non-tender; bowel sounds normal; no masses,  no organomegaly  Extremities: surgical dressing right lower leg  Neurologic: Mental status: Alert, oriented, thought content appropriate    Data    CBC:   Recent Labs     21  0459 21  0402 21  0648   WBC 2.4* 4.6 3.8*   HGB 8.0* 10.2* 9.2*   HCT 23.6* 28.1* 25.9*   MCV 97.9 91.5 93.2   PLT 22* 37* 16*       BMP:  Recent Labs     21  1256 21  0402 21  0648   * 126* 134   K 4.2 4.4 4.7    97* 100   CO2 20* 11* 15*   BUN 16 17 19   CREATININE 1.7* 1.8* 1.6*    ALB:3,BILIDIR:3,BILITOT:3,ALKPHOS:3)@    PT/INR:   Recent Labs     21  0459 21  0402   PROTIME 20.6* 19.5*   INR 1.8 1.7       ABG:   Recent Labs     21  0343   PH 7.409   PO2 349.7*   PCO2 28.6*   HCO3 17.7*   BE -6.0*   O2SAT 99.3*   METHB 0.4   O2HB 98.6*   COHB 0.3   O2CON 14.4   HHB 0.7   THB 9.7*     FiO2 : (S) 60 %       Radiology/Other tests reviewed:  none    Assessment:     Principal Problem:    Failure to thrive in adult  Active Problems:    Stage IV breast cancer in female ([facility] [address]    HTN (hypertension), benign    Diabetes mellitus type 2, uncontrolled (HCC)    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  *      Plan: 1. PET scan usually done as out-pt, thrombocytopenia a problem with biopsy though can get platelet transfusion as suggested by Hematology. Discussed with daughter, observe thrombocytopenia if improves while hospitalized or if discharged can get PET scan and decide after results obtained if will pursue biopsy further or not. 2. Cont with oxygen, taper as tolerated  3. Cont with nebs  4. OOB to chair, PT/OT      Time at the bedside, reviewing labs and radiographs, reviewing notes and consultations, discussing with staff and family was more than 35 minutes. Thanks for letting us see this patient in consultation. Please contact us with any questions. Office [phone] or after hours through [employer], [phone].

## 2021-01-28 NOTE — PROGRESS NOTES
Palliative Care Department  [phone]  Palliative Care Progress Note  Provider Lala Shearer  [id number]  Hospital Day: 5    Referring Provider: Katty Stack MD  Palliative Medicine was consulted for assistance with: Goals of care    CHIEF COMPLAINT: Mental status and hypoglycemia  Brief summary: 18-year-old female with metastatic breast cancer on oral chemotherapy. ASSESSMENT/PLAN:     Pertinent hospital diagnoses:  · Metastatic breast cancer  -Oncology consulted  -Ibrance, oral medication daily    · Thrombocytopenia/leukopenia  -Likely secondary to chemotherapy  -Trend labs    ·  Cavitary lung mass   -Pulmonology consulted    PALLIATIVE CARE ENCOUNTER  - Outcome of goals of care meeting:   -Consult hospice for information only  - Capacity: At this time, Rosales Henley, Does have capacity for medical decision-making. Capacity is time limited and situation/question specific  - Surrogate decision maker/Legal NOK:    -Daughter Justin Herron [phone]   -Daughter Anni Raymundo [phone]   -Daughter Loree Alfaro [phone]   -Daughter Fabien Cason [phone]  - Code Status:   DNR-CCA  - Advanced directives: On chart  - Spiritual assessment: No needs identified  - Bereavement and grief: None identified    - DISPO: Continued treatment, family wants to pursue biopsy and possible cancer treatment    Referrals to: Hospice    SUBJECTIVE:   Rosales Henley is a 79 y.o. with a past medical history of diabetes mellitus, GERD, COPD, metastatic breast cancer, hypertension, neuropathy, arthritis, glaucoma who presented to the emergency department from home with altered mental status and hypoglycemia.       Patient completed workup in the ED and was admitted on 1/24/2021 CT of chest revealed moderate centrilobular emphysema, 8 2.2 cm spiculated nodule with central cavitation and a moderate right pleural effusion. Additionally colonic thickening with surrounding fatty stranding is concerning for colitis. CT of the abdomen and pelvis revealed a large amount of ascites in the pelvis, decubitus ulcer in the left gluteus region and a sclerotic lesion on T10 concerning for developing bone metastasis. Was elevated to 1.7. Lactic acid elevated to 2.5. Albumin decreased to 2.1. RRT was called due to altered mental status and bradycardia patient was treated for hypoglycemia with improvement. White count was decreased overall to 2.8. H/H 8.3/23. 2. Platelets decreased at 50,000. Comparison to previous labs patient persistently leukopenic however platelet count has been variable. Patient is on Ibrance orally for metastatic breast cancer. DETAILS OF CONVERSATION:  Daughter has not called hospice back for information. SW yesterday also left message for daughter. Patient had IVC filter placed yesterday. Met with patient and daughter in the room. Patient complaining of uncontrolled pain this morning, no pain meds ordered after transfer from ICU. Discussed symptom control with patient and daughter. Went over options moving forward, family not yet ready for hospice. They want to pursue biopsy and treatment for cancer. Discussed current bone marrow suppression and that patient may not tolerate chemotherapy and to decide whether they would want to go through risk of biopsy if patient not able to tolerate chemotherapy. Family wants to talk with oncology before making further decisions. Niko Pierson did agree that if patient not able to tolerate treatment or if she decompensates they would want hospice. Patient has multiple children living nearby and would care for her at home. Prior to admission patient was taking care of herself, dressing, cooking, walking around the house. OBJECTIVE:   Prognosis: Poor    Physical Exam:  /73   Pulse 78   Temp 98 °F (36.7 °C) (Temporal)   Resp 18   Ht 5' 3\" (1.6 m)   Wt 149 lb (67.6 kg)   SpO2 98%   BMI 26.39 kg/m²   Gen:  Elderly, thin, moaning, restless, appears uncomfortable  HEENT:  Normocephalic, atraumatic, mucosa dry, sclera anicteric  Neck:  Supple, trachea midline  Lungs:  respirations unlabored  Heart[de-identified]  RRR, normal S1S2, no murmur/rub or gallop  Abd:  Soft, non tender, non distended, bowel sounds present, left upper quadrant scar  Ext:  Moving all extremities, muscular atrophy of lower extremities, no edema, pulses present  Skin:  Xerotic skin, without rash, bruising, petechiae  Neuro: alert, oriented, no focal deficit    Objective data reviewed: labs, images, records, medication use, vitals and chart  Relevant data: Per HPI    Time/Communication  Greater than 50% of time spent, total 25 minutes in counseling and coordination of care at the bedside/over the telephone regarding goals of care, symptom management, diagnosis and prognosis and see above. Thank you for allowing Palliative Medicine to participate in the care of Lucía Duenas.       Provider [address] [place] [address]

## 2021-01-28 NOTE — PLAN OF CARE
Problem: Falls - Risk of:  Goal: Will remain free from falls  Description: Will remain free from falls  1/28/2021 0143 by Gordon Oropeza RN  Outcome: Met This Shift  1/27/2021 2109 by Carline Hall RN  Outcome: Met This Shift  Goal: Absence of physical injury  Description: Absence of physical injury  1/28/2021 0143 by Gordon Oropeza RN  Outcome: Met This Shift  1/27/2021 2109 by Carline Hall RN  Outcome: Met This Shift     Problem: Skin Integrity:  Goal: Will show no infection signs and symptoms  Description: Will show no infection signs and symptoms  1/27/2021 2109 by Carline Hall RN  Outcome: Met This Shift  Goal: Absence of new skin breakdown  Description: Absence of new skin breakdown  1/28/2021 0143 by Gordon Oropeza RN  Outcome: Met This Shift  1/27/2021 2109 by Carline Hall RN  Outcome: Met This Shift

## 2021-01-28 NOTE — PROGRESS NOTES
[facility]  Hematology/Oncology  Consult      Patient Name: Silvano Zurita  YOB: 1940  PCP: Crescencio Torres MD   Referring Provider:      Reason for Consultation:   Chief Complaint   Patient presents with    Hypoglycemia     BGL for ems 29, D10 250ml given,  now        Subjective: Remains in ICU sp IVC filter placement. Patient thought she had the lung bx done and stated she wanted to know the results. I explained she had the filter placed and that it hadn't been decided yet about the lung bx but she said she wanted it to have it. History of Present Illness: This is a 26-year-old female patient of Dr Nina Aguayo being treated for stage IV breast cancer ER/MN positive and HER-2/donald negative. She is status post a right mastectomy and is currently on Ibrance/Faslodex and Xgeva. She received cycle 7 of Faslodex on 1/5. She also has a past medical history of GERD, COPD, HTN, neuropathy, and diabetes. She presented to the emergency room via EMS with altered mental status and hypoglycemia. Upon arrival to the house her blood sugar was 29 and was given IV Dex and had some improvement of her mental status. Upon arrival to the emergency room her blood sugar was at 40, oxygen was 86% on room air and she was bradycardic so a rapid response team was called. She was stabilized and admitted for further treatment. CBC showed for pancytopenia. Fibrinogen 97. CTA chest showed 22 mm spiculated nodule within the lateral aspect of the left upper lobe with central cavitation. Moderate centrilobular emphysematous changes of the lungs. Moderate right pleural effusion and atelectatic changes of right lung base. CT A/P showed colitis in the ascending and transverse. There is a large amount of inflammatory ascites in the pelvis. Collapsed stomach with wall thickening. Gastritis is a consideration. Possible decubitus ulcer in the left gluteus region and sclerotic lesion on T10 concerning for developing bone metastasis. Consultation for concern of DIC due to thrombocytopenia superimposed on chronic cytopenias and coagulopathy.        Diagnostic Data:     Past Medical History:   Diagnosis Date    Acute deep vein thrombosis (DVT) of femoral vein of left lower extremity ([address] 1/26/2021    Arthritis     Cancer (HCC)     breast    COPD (chronic obstructive pulmonary disease) ([address] 6/27/2019    Diabetes mellitus (HCC)     GERD (gastroesophageal reflux disease)     Glaucoma     Hypertension     Neuropathy     Thrombocytopenia ([address] 1/26/2021  Unspecified cerebral artery occlusion with cerebral infarction        Patient Active Problem List    Diagnosis Date Noted    Acute deep vein thrombosis (DVT) of femoral vein of left lower extremity ([facility] 01/26/2021    Thrombocytopenia ([facility] 01/26/2021    Failure to thrive in adult 01/25/2021    Lung mass 01/24/2021    Pleural effusion 01/24/2021    Acute hypoxemic respiratory failure ([address] 01/24/2021    Pancytopenia ([address] 06/28/2019    Hypoxia 06/27/2019    COPD (chronic obstructive pulmonary disease) ([address] 06/27/2019    HTN (hypertension), benign 11/29/2014    Diabetes mellitus type 2, uncontrolled ([address] 11/29/2014    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 11/29/2014    Hypokalemia 11/29/2014    CKD (chronic kidney disease) stage 3, GFR 30-59 ml/min 11/29/2014    Acute diverticulitis 11/29/2014    Stage IV breast cancer in female [facility]) 42/28/0072    Diastolic dysfunction 98/51/9701        Past Surgical History:   Procedure Laterality Date    BREAST LUMPECTOMY      BREAST SURGERY      right partial mastectomy    CHOLECYSTECTOMY      COLONOSCOPY N/A 6/4/2013    DIVERTICULOSIS;POLYPS @15CM    ECHO COMPL W DOP COLOR FLOW  1/9/2013         EYE SURGERY      HYSTERECTOMY      VENA CAVA FILTER PLACEMENT N/A 1/27/2021    VENA CAVA FILTER INSERTION performed by Erika Vásquez MD at [facility] OR       Family History  Family History   Problem Relation Age of Onset    High Blood Pressure Mother     Stroke Mother     Early Death Father 61        liver disease    Heart Disease Father     High Blood Pressure Father     Asthma Sister     Cancer Brother     High Blood Pressure Brother     Stroke Brother        Social History    TOBACCO:   reports that she quit smoking about 23 years ago. She started smoking about 49 years ago. She has a 12.50 pack-year smoking history. She has never used smokeless tobacco.  ETOH:   reports no history of alcohol use.     Home Medications Prior to Admission medications    Medication Sig Start Date End Date Taking? Authorizing Provider   prednisoLONE acetate (PRED FORTE) 1 % ophthalmic suspension Place 1 drop into both eyes daily   Yes Historical Provider, MD   ketorolac (ACULAR) 0.5 % ophthalmic solution Place 1 drop into both eyes daily   Yes Historical Provider, MD   fluticasone-vilanterol (BREO ELLIPTA) 100-25 MCG/INH AEPB inhaler Inhale 1 puff into the lungs daily 12/16/20  Yes Freddie Win MD   potassium chloride (MICRO-K) 10 MEQ extended release capsule Take 20 mEq by mouth 2 times daily   Yes Historical Provider, MD   albuterol (PROVENTIL) (2.5 MG/3ML) 0.083% nebulizer solution Take 3 mLs by nebulization every 6 hours as needed for Wheezing 6/28/19  Yes Freddie Win MD   dexamethasone (DECADRON) 0.1 % ophthalmic solution 1 drop 3/7/19  Yes Historical Provider, MD   clobetasol prop emollient base 0.05 % CREA    Yes Historical Provider, MD   gabapentin (NEURONTIN) 400 MG capsule nightly 5/30/19  Yes Historical Provider, MD   ofloxacin (OCUFLOX) 0.3 % solution Place 1 drop into both eyes  3/7/19  Yes Historical Provider, MD   minocycline (MINOCIN;DYNACIN) 100 MG capsule Take 100 mg by mouth 2 times daily 12/18/12  Yes Historical Provider, MD   palbociclib (IBRANCE) 75 MG capsule 100 mg    Yes Historical Provider, MD   gabapentin (NEURONTIN) 300 MG capsule Take 300 mg by mouth 2 times daily. Yes Historical Provider, MD   albuterol (PROVENTIL HFA;VENTOLIN HFA) 108 (90 BASE) MCG/ACT inhaler Inhale 2 puffs into the lungs every 6 hours as needed for Wheezing. Yes Historical Provider, MD   Oyster Shell 500 MG TABS Take 500 mg by mouth daily. Yes Historical Provider, MD   verapamil (CALAN-SR) 180 MG CR tablet Take 180 mg by mouth 2 times daily. Yes Historical Provider, MD   Lansoprazole (PREVACID PO) Take 30 mg by mouth daily.    Yes Historical Provider, MD losartan-hydrochlorothiazide (HYZAAR) 50-12.5 MG per tablet Take 1 tablet by mouth daily. Yes Historical Provider, MD   Loratadine (CLARITIN) 10 MG CAPS Take 1 capsule by mouth daily. Yes Historical Provider, MD   glimepiride (AMARYL) 1 MG tablet Take 1 tablet by mouth 2 times daily (with meals) If eating  Patient taking differently: Take 1 mg by mouth daily If eating 6/28/19   Landy Santacruz MD       Allergies  Allergies   Allergen Reactions    Alcohol     Eggs Or Egg-Derived Products     Rubbing Alcohol [Alc-Cynara Scolymus] Hives    Ethanol Rash     Patient gets blisters       Penicillins Rash    Sulfa Antibiotics Itching and Rash       Review of Systems: Fatigued weak malaise cold chills nausea decreased appetite. ? Constitutional:  No fever chills or rigors. ? Eyes: No changes in vision, discharge, or pain  ? ENT: No Headaches, hearing loss or vertigo. No mouth sores or sore throat. No change in taste or smell. ? Cardiovascular: No chest discomfort, dyspnea on exertion, palpitations or loss of consciousness. or phlebitis. ? Respiratory: Has no cough or wheezing, Has no sputum production. Has no hemoptysis, Has no pleuritic pain, .   ? Gastrointestinal: No abdominal pain, appetite loss, blood in stools. No change in bowel habits. No hematemesis   ? Genitourinary: Patient acknowledges no dysuria, trouble voiding, or hematuria. No nocturia or increased frequency. ? Musculoskeletal: No gait disturbance, weakness or joint complaints. ? Integumentary: No rash or pruritis. ? Neurological: No headache, diplopia, change in muscle strength, numbness or tingling. No change in gait, balance, coordination, mood, affect, memory, mentation, behavior. ? Psychiatric: No anxiety, or depression. ? Endocrine: No temperature intolerance. No excessive thirst, fluid intake, or urination. No tremor. ? Hematologic/Lymphatic: No abnormal bruising or bleeding, blood clots or swollen lymph nodes. ? Allergic/Immunologic: No nasal congestion or hives. Objective  /73   Pulse 78   Temp 98 °F (36.7 °C) (Temporal)   Resp 18   Ht 5' 3\" (1.6 m)   Wt 149 lb (67.6 kg)   SpO2 98%   BMI 26.39 kg/m²     Physical Exam:     General: AAO to person, place, time, in no acute distress,   Head and neck : PERRLA, EOMI . Sclera non icteric. Oropharynx : Clear  Neck: no JVD,  no adenopathy  Heart: Regular rate and regular rhythm, no murmur  Lungs: Diminished clear to auscultation   Extremities: No edema,no cyanosis, no clubbing. Abdomen: Soft, non-tender;no masses, no organomegaly  Skin:  Healing blister left leg covered. Neurologic:Cranial nerves grossly intact. No focal motor or sensory deficits .     Recent Laboratory Data-   Lab Results   Component Value Date    WBC 3.8 (L) 01/28/2021    HGB 9.2 (L) 01/28/2021    HCT 25.9 (L) 01/28/2021    MCV 93.2 01/28/2021    PLT 16 (LL) 01/28/2021    LYMPHOPCT 8.7 (L) 01/27/2021    RBC 2.78 (L) 01/28/2021    MCH 33.1 01/28/2021    MCHC 35.5 (H) 01/28/2021    RDW 16.8 (H) 01/28/2021    NEUTOPHILPCT 89.6 (H) 01/27/2021    MONOPCT 1.7 (L) 01/27/2021    EOSPCT 7 (H) 10/13/2010    BASOPCT 0.0 01/27/2021    NEUTROABS 4.14 01/27/2021    LYMPHSABS 0.41 (L) 01/27/2021    MONOSABS 0.09 (L) 01/27/2021    EOSABS 0.00 (L) 01/27/2021    BASOSABS 0.00 01/27/2021       Lab Results   Component Value Date     01/28/2021    K 4.7 01/28/2021     01/28/2021    CO2 15 (L) 01/28/2021    BUN 19 01/28/2021    CREATININE 1.6 (H) 01/28/2021    GLUCOSE 129 (H) 01/28/2021    CALCIUM 7.2 (L) 01/28/2021    PROT 5.6 (L) 01/28/2021    LABALBU 2.1 (L) 01/28/2021    BILITOT 0.5 01/28/2021    ALKPHOS 115 (H) 01/28/2021    AST 44 (H) 01/28/2021    ALT 27 01/28/2021    LABGLOM 38 01/28/2021    GFRAA 38 01/28/2021       Lab Results   Component Value Date    IRON 105 01/25/2021    TIBC 94 (L) 01/25/2021    FERRITIN 296 01/25/2021           Radiology- Ct Abdomen Pelvis Wo Contrast Additional Contrast? None    Result Date: 1/24/2021  EXAMINATION: CT OF THE ABDOMEN AND PELVIS WITHOUT CONTRAST 1/24/2021 5:59 pm TECHNIQUE: CT of the abdomen and pelvis was performed without the administration of intravenous contrast. Multiplanar reformatted images are provided for review. Dose modulation, iterative reconstruction, and/or weight based adjustment of the mA/kV was utilized to reduce the radiation dose to as low as reasonably achievable. COMPARISON: Previous examination April 20, 2017 HISTORY: ORDERING SYSTEM PROVIDED HISTORY: Concern for colitis TECHNOLOGIST PROVIDED HISTORY: Reason for exam:->Concern for colitis Additional Contrast?->None Decision Support Exception->Emergency Medical Condition (MA) What reading provider will be dictating this exam?->CRC FINDINGS: The lung bases demonstrate patchy bibasilar infiltrates and pleural effusions more on the right side concerning for pneumonia or edema. Right mastectomy is noted. Liver is decreased in attenuation likely fatty infiltrated. Gallbladder is absent. Spleen, pancreas, the adrenals and the kidneys are normal.  There is collapsed stomach with wall thickening. Degenerative changes are identified in the  lumbar spine with the some developing sclerotic lesion on T10 concerning for bone metastasis. Pelvis. Bladder is partially distended with Nagy catheter. There is diffuse wall thickening and edema of the ascending colon and transverse colon. There is mild thickening of the left hemicolon with scattered diverticulosis. A large amount of ascites is present in the pelvis probably inflammatory. There is a decubitus ulcer in the left gluteus region. Diffuse wall thickening and edema of the ascending and transverse colon concerning for colitis. There is a large amount of inflammatory ascites in the pelvis. Atelectasis/infiltrates and pleural effusion lung bases likely pneumonia or edema. Collapsed stomach with wall thickening. Gastritis is a consideration. Possible decubitus ulcer in the left gluteus region and sclerotic lesion on T10 concerning for developing bone metastasis. Ct Head Wo Contrast    Result Date: 1/24/2021  EXAMINATION: CT OF THE HEAD WITHOUT CONTRAST  1/24/2021 4:16 pm TECHNIQUE: CT of the head was performed without the administration of intravenous contrast. Dose modulation, iterative reconstruction, and/or weight based adjustment of the mA/kV was utilized to reduce the radiation dose to as low as reasonably achievable. COMPARISON: CT head without contrast, August 15, 2018 HISTORY: ORDERING SYSTEM PROVIDED HISTORY: [facility] TECHNOLOGIST PROVIDED HISTORY: Has a \"code stroke\" or \"stroke alert\" been called? ->No Reason for exam:->ams What reading provider will be dictating this exam?->CRC FINDINGS: BRAIN/VENTRICLES: No mass effect, edema or hemorrhage is seen. Mild volume loss is seen in the cerebrum with mild chronic microvascular ischemic changes. No hydrocephalus or extra-axial fluid is seen. ORBITS: Prosthetic lenses are seen in the globes bilaterally. The orbits are otherwise grossly unremarkable. SINUSES: The visualized paranasal sinuses and mastoid air cells demonstrate no acute abnormality. SOFT TISSUES/SKULL:  No acute abnormality of the visualized skull or soft tissues. No acute intracranial abnormality.     Ct Chest Wo Contrast    Result Date: 1/24/2021 EXAMINATION: CT OF THE CHEST WITHOUT CONTRAST 1/24/2021 4:16 pm TECHNIQUE: CT of the chest was performed without the administration of intravenous contrast. Multiplanar reformatted images are provided for review. Dose modulation, iterative reconstruction, and/or weight based adjustment of the mA/kV was utilized to reduce the radiation dose to as low as reasonably achievable. COMPARISON: None. HISTORY: ORDERING SYSTEM PROVIDED HISTORY: [employer], TECHNOLOGIST PROVIDED HISTORY: Reason for exam:->[employer], What reading provider will be dictating this exam?->CRC FINDINGS: Lines and tubes:  None. Lungs and Airways and Pleura:  Central airways are patent. Moderate right pleural effusion with associated dependent atelectatic changes of the right lung base. No pneumothorax. There is a 22 mm spiculated nodule within the lateral aspect of the left upper lobe adjacent to the left major fissure extending into the pleural surface. The nodule also demonstrates central cavitation. There is moderate centrilobular emphysematous changes of the lungs. Lymph nodes: No pathologically enlarged mediastinal, hilar, lower cervical, or chest wall lymph nodes. Cardiovascular and Mediastinum:  Cardiac chamber sizes appear to measure within normal limits on this non ECG gated study. No pericardial effusion. Bones/Soft tissues:  No definite suspicious lytic or blastic foci. Upper abdomen: In the visualized part of upper abdomen there is suggestion of significant colonic wall thickening of hepatic flexure with extensive amount of surrounding fatty stranding highly concerning for an underlying colitis. Calcification within the spleen. Finding is likely sequela to prior granulomatous disease exposure. Delsa Severance 22 mm spiculated nodule within the lateral aspect of the left upper lobe with central cavitation. The lesion is concerning for underlying lung malignancy. Considering the size and appearance either tissue sampling or PET-CT examination can be performed Moderate centrilobular emphysematous changes of the lungs. Moderate right pleural effusion and atelectatic changes of right lung base. Significant colonic wall thickening with surrounding fatty stranding is highly concerning for colitis. For further evaluation CT of the abdomen can be obtained. Xr Chest Portable    Result Date: 1/24/2021  EXAMINATION: ONE XRAY VIEW OF THE CHEST 1/24/2021 1:23 pm COMPARISON: August 15, 2019 HISTORY: ORDERING SYSTEM PROVIDED HISTORY: Hypoglycemia, hypoxia TECHNOLOGIST PROVIDED HISTORY: Reason for exam:->Hypoglycemia, hypoxia What reading provider will be dictating this exam?->CRC FINDINGS: Heart and the mediastinal structures are normal.  There is COPD with atelectasis in the lung bases. degenerative changes are noted in the shoulders. Stable nonacute chest with atelectasis in the lung bases. ASSESSMENT/PLAN :    71-year-old female    GERD, COPD, HTN, neuropathy, and diabetes. Stage IV breast cancer with skeletal metastasis, ER/MT positive and HER-2/donald negative. Status post a right mastectomy and is currently on Ibrance/Faslodex and Xgeva. She received cycle 7 of Faslodex on 1/5. CTA chest showed 22 mm spiculated nodule within the lateral aspect of the left upper lobe with central cavitation. Centrilobular emphysema also noted  CBC showed for pancytopenia. Fibrinogen 97.   CT A/P showed sclerotic lesion on T10 concerning for developing bone metastasis. Significant colonic wall thickening suspicious for colitis. Her pancytopenia is related to myelosuppression by Ibrance  Her anemia is also contributed to by chronic kidney disease. Her leukopenia is predominantly related to lymphopenia. Her thrombocytopenia is related to myelosuppression by Ibrance    There is no clinical suggestion of DIC. Her moderately decreased fibrinogen and slightly prolonged PTT is related to liver disease hypoalbuminemia with an abnormal inseminations. To check iron studies, reticulocyte count, peripheral blood smear review B12 and folate and serum LDH. We will transfuse with 1 unit of packed RBC in view of her hypoxia and to improve oxygen carrying capacity. We will discuss with family CT-guided biopsy of spiculated lung lesion to exclude a primary lung neoplasm    1/26/21  - Hospice consulted for information only  - Hgb improved s/p transfusion and WBC stable, further fall in platelet count to 22  - Iron profile reviewed. Adequate  - On folic acid  - If patient wishes to proceed with lung biopsy, likely need to transfuse 2 unit platelets prior to procedure (should finish 1 hour prior to procedure)  - Plans for IVCF 1/27 due to thrombocytopenia and LLE DVT    1/27/21  -She is status post IVC filter placement   -Platelets are slightly improved to 37 today  -Peripheral smear showed platelets present in decreased numbers and display normal morphology. Red cells are normocytic normochromic with moderately anisopoikilocytosis including occasional target cells, ovalocytes, and scattered hypochromic cells and rare nucleated red blood cells. White blood cells are morphologically and quantitatively unremarkable with exception of moderate lymphopenia. -Reticulocyte panel abnormal.  Thrombocytopenia is multifactorial and related to myelosuppression by [facility] and also contributed to by consumption by her extensive DVT  -LDH WNL  -Vitamin B12 is high and folate is low, she is on folic acid. - If patient wishes to proceed with lung biopsy, likely need to transfuse 2 unit platelets prior to procedure (should finish 1 hour prior to procedure). 1/28/21  - CBC with plts of 16, wbc 3.8, hg 9.2. - S/p IVC filter placement yesterday   - Transferred out of ICU to stepdown   - Biopsy seems to be on hold due to thrombocytopenia. Agree that outpatient PET scan could provide a safer alternative if biopsy is not able to be completed inpatient  - Hospice provided info to pt     DEANNA Mims - CNP  Electronically signed 1/28/2021 at 8:44 AM   Patient seen and examined. Note edited to reflect above.   Chris Rajan MD

## 2021-01-28 NOTE — PROGRESS NOTES
Case reviewed by Dr. Susana Somers. Requires PET scan first given the patient's other medical problems. Please contact IR for further follow up if required post PET scan. Called floor and spoke with Natasha Silva RN.

## 2021-01-28 NOTE — PROGRESS NOTES
Nurse to nurse report called to 77.     Electronically signed by Sushant Moyer RN on 1/28/2021 at 12:04 AM

## 2021-01-28 NOTE — PROGRESS NOTES
[facility]          Liaison Information Visit Note              Patient Name: Blanche Kennedy   :  1940  MRN:  [id number]    Admit date:  2021    Admitted from: [facility] Admitting Physician:  Maya Russell MD   PCP:  Shana Hopson MD      Primary Insurance: Payor: [facility] /  /  /    Secondary Insurance:  none    Emergency Contact:      Contact/Relation:   /         Phone:       Contact/Relation:   /     Phone:     Advance Directive  Advance directives received No  Patient has NOT completed an advance directive   Discussed with: Family member  DPOA-HC Name-Relation:    Phone:       Terminal Diagnosis breast cancer, ES COPD per Colletta Frankel, [place] Palliative medicine       Current Hospital Problem List:   Patient Active Problem List   Diagnosis Code    Diastolic dysfunction I60.88    Stage IV breast cancer in female ([address] C50.919    HTN (hypertension), benign I10    Diabetes mellitus type 2, uncontrolled ([address] E11.65    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, E16.0    Hypokalemia E87.6    CKD (chronic kidney disease) stage 3, GFR 30-59 ml/min N18.30    Acute diverticulitis K57.92    Hypoxia R09.02    COPD (chronic obstructive pulmonary disease) ([address] J44.9    Pancytopenia ([address] D61.818    Lung mass R91.8    Pleural effusion J90    Acute hypoxemic respiratory failure ([address] J96.01    Failure to thrive in adult R62.7    Acute deep vein thrombosis (DVT) of femoral vein of left lower extremity (HCC) I82.412    Thrombocytopenia (HCC) D69.6       Code Status Order: DNR-CCA     Past Medical History:        Diagnosis Date    Acute deep vein thrombosis (DVT) of femoral vein of left lower extremity ([address] 2021    Arthritis     Cancer ([address]     breast    COPD (chronic obstructive pulmonary disease) ([address] 2019    Diabetes mellitus (HCC)     GERD (gastroesophageal reflux disease)     Glaucoma     Hypertension     Neuropathy  Thrombocytopenia ([facility] 1/26/2021    Unspecified cerebral artery occlusion with cerebral infarction      Past Surgical History:        Procedure Laterality Date    BREAST LUMPECTOMY      BREAST SURGERY      right partial mastectomy    CHOLECYSTECTOMY      COLONOSCOPY N/A 6/4/2013    DIVERTICULOSIS;POLYPS @15CM    ECHO COMPL W DOP COLOR FLOW  1/9/2013         EYE SURGERY      HYSTERECTOMY      VENA CAVA FILTER PLACEMENT N/A 1/27/2021    VENA CAVA FILTER INSERTION performed by Tresa Argueta MD at [address]       Allergies:  Alcohol, Eggs or egg-derived products, Rubbing alcohol [alc-cynara scolymus], Ethanol, Penicillins, and Sulfa antibiotics    Family Goal: wanting biopsy and treatment options based on biopsy results    Meeting held with patient and daughter Quinn Desir The hospice benefit and philosophy were explained including that hospice is end of life care in which, per Medicare, a patient has a terminal diagnosis that life expectancy would be 6 months or less. Hospice care is a service that is covered by most insurance plans. The following levels of hospice care were discussed including, routine level of hospice care at private home or facility, which patient/family is responsible for any room and board fees at the facility, and general in patient level of care (GIP) at the [place] [address] for short term symptom management. Per Medicare guidelines, a patient is considered appropriate for GIP if they have uncontrolled symptoms such as pain, agitation, labored breathing or nausea/vomiting. Once symptoms become managed, the patient would need to be moved to a lower level of care such as home with hospice, ECF with hospice or the Transition program.  [place] [address] transition program also explained which is routine hospice care provided at the [place] [address] instead of an ECF or home. The transition program is private pay $300/day for room/board. Room/board for the transition program is not covered by Medicaid as would be in an ECF. Family informed that with the routine level of care at home or ECF,  the hospice team consists of the RN who visits 1-3 times a week, a  who visits within the first five days of the hospice election, the personal care team who visit 1-3 times a week, non-medical volunteers and Chaplains. Explained that at home in routine level of care, familles are responsible for the 24 hour care. Discussed that under hospice care patient would not receive chemotherapy, radiation, immune therapy, IV antibiotics, dialysis or blood transfusions. Explained that once in hospice care, all aggressive treatments would be stopped and allow nature to takes its course with focus on comfort care for the patient. Jose Raul Mendoza states that they are awaiting a biopsy to be done and the results before making any decisions. I instructed Jose Raul Mendoza that she could call anytime to transition to hospice care and if decided after discharge from the hospital, they can call [facility] directly. Updated CM and RN    Discharge Plan:  Discharge Disposition; unknown  Facility Name: unknown    HOT plan:  1. Family awaiting biopsy results. Referral was for information only at this time. 2. Please call [facility] [phone] with any questions. 3. Patient not currently under the care of hospice.     Electronically signed by Evangelina Welsh RN on 1/28/2021 at 10:09 AM

## 2021-01-28 NOTE — PLAN OF CARE
Problem: Falls - Risk of:  Goal: Will remain free from falls  Description: Will remain free from falls  1/28/2021 0514 by Sarthak Pierre RN  Outcome: Met This Shift     Problem: Falls - Risk of:  Goal: Absence of physical injury  Description: Absence of physical injury  1/28/2021 0514 by Sarthak Pierre RN  Outcome: Met This Shift     Problem: Skin Integrity:  Goal: Absence of new skin breakdown  Description: Absence of new skin breakdown  1/28/2021 0514 by Sarthak Pierre RN  Outcome: Met This Shift

## 2021-01-29 PROBLEM — E43 SEVERE PROTEIN-CALORIE MALNUTRITION (HCC): Chronic | Status: ACTIVE | Noted: 2021-01-01

## 2021-01-29 NOTE — PROGRESS NOTES
Physical Therapy  Facility/Department: [facility]  Daily Treatment Note  NAME: Elliott Gamino  : 1940  MRN: [id number]    Date of Service: 2021    Referring Provider:  Dilia Erickson MD    Evaluating PT:  Ronaldo Gutierres, PT, DPT [id number]      Room #:  8755/2738-Q  Diagnosis:  Lung mass   PMHx/PSHx:  Arthritis, breast CA, COPD, DM,  GERD, glaucoma, HTN, neuropathy, CVA  Precautions:  Falls, O2, Legally blind  Procedures: IVC filter placement 21  Equipment Needs:  hospital bed, delia lift     SUBJECTIVE:     Pt lives with dtr in a 1 story home with 1+1 stairs to enter and 1 rail. Bedroom and bathroom are on the 1st level. Pt ambulated with Rollator PTA.     OBJECTIVE:    Initial Evaluation  Date: 21 Treatment  21 Short Term/ Long Term   Goals   AM-PAC 6 Clicks      Was pt agreeable to Eval/treatment? Yes  yes     Does pt have pain? No c/o pain  Abdominal pain, pain with all mobility- does not rate     Bed Mobility  Rolling: Max A  Supine to sit: Max A x2  Sit to supine: Max A x2  Scooting: Dep x2  Rolling: Max A  Supine to sit: Depx2  Sit to supine: Depx2  Scooting: Depx2   Rolling: Mod A  Supine to sit: Mod A  Sit to supine: Mod A  Scooting: Mod A   Transfers Sit to stand: NT  Stand to sit: NT  Stand pivot: NT  Sit to stand: NT  Stand to sit: NT  Stand pivot: NT Sit to stand: Mod A  Stand to sit: Mod A  Stand pivot: Mod A with [employer]   Ambulation    NT NT  >5 feet with [employer] Max A   Stair negotiation: ascended and descended  NT  NT NA   ROM BUE:  Per OT eval  BLE:  WFL       Strength BUE:  Per OT eval   BLE:  Grossly 3/5       Balance Sitting EOB:  SBA static; Min A dynamic   Dynamic Standing:  NT  Sitting EOB: Max A  Dynamic standing: NT Sitting EOB:  SBA  Dynamic Standing:   Mod A      Pt is A & O x 3 grossly  Sensation:  Pt denies numbness and tingling to extremities  Edema:  unremarkable    Patient education Pt educated on role of therapy, safety with mobility, participation and technique with bed mobility    Patient response to education:   Pt verbalized understanding Pt demonstrated skill Pt requires further education in this area   yes With assistance yes     ASSESSMENT:    Comments:  Pt resting semi-supine upon arrival, agreeable to PT session with substantial family encouragement. Pt demonstrates poor use of B UEs with supine<>sit and sitting EOB. Pt expressing high levels of pain with all movements and reporting ongoing pain in abdomen at rest and with mobility. Pt requires manual assist for midline alinement and to prevent L lateral LOB while sitting EOB. Verbal/tactile cues for engagement of B UEs to improve sitting balance with poor carryover noted. Pt returned to bed upon completion of session with all needs in reach. Pt daughter remaining at bedside. Treatment:  Patient practiced and was instructed in the following treatment:    ? Bed mobility: max verbal/tactile cues for engagement of B UEs and B LEs with bed mobility, manual assist for rolling and supine<>sit as noted above  ? Therapeutic activities: sitting EOB x5 minutes with Max A    PLAN:    Patient is making limited progress towards established goals. Will continue with current POC.         PLAN:    Time in  1320  Time out  1338    Total Treatment Time  18    CPT codes:  [] Gait training 60200 0 minutes  [] Manual therapy 55816 0 minutes  [x] Therapeutic activities 69188 18 minutes  [] Therapeutic exercises 23363 0 minutes  [] Neuromuscular reeducation 25327 0 minutes      Geoffrey Mauricio, PT, DPT  [id number]

## 2021-01-29 NOTE — PROGRESS NOTES
Subjective:    Out of ICU  Awake alert and not feeling very well today  Still weak but improved over the past few days  Liquidy diarrhea      Objective:    BP (!) 124/58   Pulse 97   Temp 97 °F (36.1 °C) (Temporal)   Resp 18   Ht 5' 3\" (1.6 m)   Wt 149 lb (67.6 kg)   SpO2 94%   BMI 26.39 kg/m²     No intake/output data recorded. No intake/output data recorded. General appearance: NAD, conversant  HEENT: AT/NC, MMM  Neck: FROM, supple  Lungs: Clear to auscultation  CV: RRR, no MRGs  Vasc: Radial pulses 2+  Abdomen: Soft, non-tender; no masses or HSM  Extremities: No peripheral edema or digital cyanosis  Skin: no rash, lesions or ulcers  Psych: Alert and oriented to person, place and time  Neuro: Alert and interactive     Recent Labs     01/27/21  0402 01/28/21  0648 01/29/21  0602   WBC 4.6 3.8* 3.0*   HGB 10.2* 9.2* 9.1*   HCT 28.1* 25.9* 25.8*   PLT 37* 16* 33*       Recent Labs     01/27/21  0402 01/28/21  0648 01/29/21  0602   * 134 140   K 4.4 4.7 5.5*   CL 97* 100 105   CO2 11* 15* 22   BUN 17 19 27*   CREATININE 1.8* 1.6* 1.5*   CALCIUM 6.8* 7.2* 7.6*       Assessment:    Principal Problem:    Failure to thrive in adult  Active Problems:    Stage IV breast cancer in female ([facility]    HTN (hypertension), benign    Diabetes mellitus type 2, uncontrolled ([facility]    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  *      Plan:  Admit to general medical floor for evaluation of failure to thrive/hypoglycemia in patient undergoing chemotherapy for metastatic breast cancer now with bony mets as well as mets to the lungs

## 2021-01-29 NOTE — PROGRESS NOTES
Palliative Care Department  [phone]  Palliative Care Progress Note  Provider Jesi HUERTA-ALEKSANDRA    Antonia Crain  [id number]  Hospital Day: 6    Referring Provider: Elijah Godinez MD  Palliative Medicine was consulted for assistance with: Goals of care    CHIEF COMPLAINT: Mental status and hypoglycemia  Brief summary: 67-year-old female with metastatic breast cancer on oral chemotherapy. ASSESSMENT/PLAN:     Pertinent hospital diagnoses:  · Metastatic breast cancer  -Oncology consulted  -Ibrance, oral medication daily    · Thrombocytopenia/leukopenia  -Likely secondary to chemotherapy  -Trend labs    ·  Cavitary lung mass   -Pulmonology consulted   -For PET as outpatient   -Biopsy if needed and able as per oncology    PALLIATIVE CARE ENCOUNTER  - Outcome of goals of care meeting:   -Consult hospice for information only  - Capacity: At this time, Antonia Crain, Does have capacity for medical decision-making. Capacity is time limited and situation/question specific  - Surrogate decision maker/Legal NOK:    -Daughter Toma Finn [phone]   -Daughter Mendoza Johnson [phone]   -Daughter Lul Joya [phone]   -Daughter Jenifer Hill [phone]  - Code Status:   DNR-CCA  - Advanced directives: On chart  - Spiritual assessment: No needs identified  - Bereavement and grief: None identified    - DISPO: Continued treatment, family wants to pursue biopsy and possible cancer treatment    There are no further PM needs at this time. PM will now sign off. If new PM needs arise, please re-consult. Thank you.     SUBJECTIVE: Sonido Rowell is a 79 y.o. with a past medical history of diabetes mellitus, GERD, COPD, metastatic breast cancer, hypertension, neuropathy, arthritis, glaucoma who presented to the emergency department from home with altered mental status and hypoglycemia. CT of chest revealed moderate centrilobular emphysema, 8 2.2 cm spiculated nodule with central cavitation and a moderate right pleural effusion. Additionally colonic thickening with surrounding fatty stranding is concerning for colitis. CT of the abdomen and pelvis revealed a large amount of ascites in the pelvis, decubitus ulcer in the left gluteus region and a sclerotic lesion on T10 concerning for developing bone metastasis. Was elevated to 1.7. Lactic acid elevated to 2.5. Albumin decreased to 2.1. RRT was called due to altered mental status and bradycardia patient was treated for hypoglycemia with improvement. White count was decreased overall to 2.8. H/H 8.3/23. 2. Platelets decreased at 50,000. Comparison to previous labs patient persistently leukopenic however platelet count has been variable. Patient is on Ibrance orally for metastatic breast cancer. DETAILS OF CONVERSATION:  Patient seen at the bedside with the daughter present. She is alert and oriented but a bit slow to respond. She complains of abdominal pain which is intermittent, otherwise she does not appear to be in any distress. The goal of care is to continue workup regarding her lung mass, likely for PET as outpatient. She will be going home, likely with [place] [address].      OBJECTIVE:   Prognosis: Poor    Physical Exam:  BP (!) 124/58   Pulse 97   Temp 97 °F (36.1 °C) (Temporal)   Resp 12   Ht 5' 3\" (1.6 m)   Wt 149 lb (67.6 kg)   SpO2 94%   BMI 26.39 kg/m²   Gen:  Elderly, thin, no acute distress, mildly restlessness noted  HEENT:  Normocephalic, atraumatic, mucosa dry, sclera anicteric  Neck:  Supple, trachea midline  Lungs:  respirations unlabored  Heart[de-identified]  RRR Abd:  Soft, non tender, non distended, bowel sounds present, left upper quadrant scar  Ext:  Moving all extremities, muscular atrophy of lower extremities, no edema, pulses present  Skin:  Xerotic skin, without rash, bruising, petechiae  Neuro: alert, oriented, no focal deficit    Objective data reviewed: labs, images, records, medication use, vitals and chart  Relevant data: Per HPI    Time/Communication  Greater than 50% of time spent, total 15 minutes in counseling and coordination of care at the bedside/over the telephone regarding goals of care, symptom management, diagnosis and prognosis and see above. Thank you for allowing Palliative Medicine to participate in the care of Leola Branch.       Provider Nelsy HUERTA-CNP  Palliative Medicine

## 2021-01-29 NOTE — CARE COORDINATION
This CM notified pt's CM through [address] via  [phone] d/t pt's not through direction home per Sabas Dupont, she is through [facility] and [facility] would need to transition her over to direction home if determined pt had needs.

## 2021-01-29 NOTE — PROGRESS NOTES
# Staceyt Tiana is non-formulary and will not be dispensed by the pharmacy at this time. Please have patient bring in home supply of medication and deliver it to pharmacy for identification and barcode. If patient has not supplied medication by 1400 on 01/29/21, please notify pharmacy.  #

## 2021-01-29 NOTE — DISCHARGE SUMMARY
Physician Discharge Summary     Patient ID:  Kain Michael  [id number]  79 y.o.  1940    Admit date: 1/24/2021    Discharge date and time: 1/29/2021    Admission Diagnoses:   Patient Active Problem List   Diagnosis    Diastolic dysfunction    [facility] IV [facility] in female ([facility]    HTN (hypertension), benign    Diabetes mellitus type 2, uncontrolled ([facility]    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    Hypokalemia    CKD (chronic kidney disease) stage 3, GFR 30-59 ml/min    Acute diverticulitis    Hypoxia    COPD (chronic obstructive pulmonary disease) (HCC)    Pancytopenia (HCC)    Lung mass    Pleural effusion    Acute hypoxemic respiratory failure (HCC)    Failure to thrive in adult    Acute deep vein thrombosis (DVT) of femoral vein of left lower extremity (HCC)    Thrombocytopenia (HCC)    Severe protein-calorie malnutrition ([facility]       Discharge Diagnoses: lung lesion , breast cancer with mets ,pancytopenia  hypoglycemia , failure to thrive , left leg dvt     Consults: oncology, pulm , critical care     Procedures: ivc filter     Hospital Course: Admit to general medical floor for evaluation of failure to thrive/hypoglycemia in patient undergoing chemotherapy for metastatic breast cancer now with probable  bony mets as well as mets to the lungs versus new lung primary ca       hypoglycemia resolved  Wean down Solu-Cortef and start midodrine- prednisone for home for 5 more days   Encourage p.o. intake-consider calorie count  Discontinue sulfonylureas at home   Discontinue negative chronotropic agents  monitor blood sugars every 6 hours at home        Spiculated lung lesion concerning for primary lung CA, patient with known history of metastatic breast cancer  Await IR directed biopsy- to be done as outpt after PET  scan   Possible metastatic disease from the breast  as well  Hematology oncology followed  Resume Ibrance      Questionable colitis on CT abdomen pelvis flagyl 5 more days at dc        Pancytopenia  Likely secondary to chemotherapy/immunocompromised host  Continue to monitor   Transfuse if hemoglobin less than 7- 9 / 26 on dc   Platelet transfusion if platelet count drops below 15,000  H&H improved with blood products  Platelet count up to 33,000 today on dc      Extensive DVT left leg   S/o IVC filter placed 1/27 as pt cannot be anticoagulated dt pancytopenia       Hyperkalemia  She is having diarrhea, anticipate potassium to drop on its own  Recheck as outpatient      Palliative care evaluation to delineate goals of care  Hospice consult for information  Would consider hospice and DNR CC given metastatic CA and likely new lung primary - poor prognosis - not sure family is ready to accept this     Recent Labs     01/27/21  0402 01/28/21  0648 01/29/21  0602   WBC 4.6 3.8* 3.0*   HGB 10.2* 9.2* 9.1*   HCT 28.1* 25.9* 25.8*   PLT 37* 16* 33*       Recent Labs     01/27/21  0402 01/28/21  0648 01/29/21  0602   * 134 140   K 4.4 4.7 5.5*   CL 97* 100 105   CO2 11* 15* 22   BUN 17 19 27*   CREATININE 1.8* 1.6* 1.5*   CALCIUM 6.8* 7.2* 7.6*       Ct Abdomen Pelvis Wo Contrast Additional Contrast? None    Result Date: 1/24/2021 Diffuse wall thickening and edema of the ascending and transverse colon concerning for colitis. There is a large amount of inflammatory ascites in the pelvis. Atelectasis/infiltrates and pleural effusion lung bases likely pneumonia or edema. Collapsed stomach with wall thickening. Gastritis is a consideration. Possible decubitus ulcer in the left gluteus region and sclerotic lesion on T10 concerning for developing bone metastasis. Ct Head Wo Contrast    Result Date: 1/24/2021  EXAMINATION: CT OF THE HEAD WITHOUT CONTRAST  1/24/2021 4:16 pm TECHNIQUE: CT of the head was performed without the administration of intravenous contrast. Dose modulation, iterative reconstruction, and/or weight based adjustment of the mA/kV was utilized to reduce the radiation dose to as low as reasonably achievable. COMPARISON: CT head without contrast, August 15, 2018 HISTORY: ORDERING SYSTEM PROVIDED HISTORY: [facility] TECHNOLOGIST PROVIDED HISTORY: Has a \"code stroke\" or \"stroke alert\" been called? ->No Reason for exam:->ams What reading provider will be dictating this exam?->CRC FINDINGS: BRAIN/VENTRICLES: No mass effect, edema or hemorrhage is seen. Mild volume loss is seen in the cerebrum with mild chronic microvascular ischemic changes. No hydrocephalus or extra-axial fluid is seen. ORBITS: Prosthetic lenses are seen in the globes bilaterally. The orbits are otherwise grossly unremarkable. SINUSES: The visualized paranasal sinuses and mastoid air cells demonstrate no acute abnormality. SOFT TISSUES/SKULL:  No acute abnormality of the visualized skull or soft tissues. No acute intracranial abnormality.     Ct Chest Wo Contrast    Result Date: 1/24/2021 22 mm spiculated nodule within the lateral aspect of the left upper lobe with central cavitation. The lesion is concerning for underlying lung malignancy. Considering the size and appearance either tissue sampling or PET-CT examination can be performed Moderate centrilobular emphysematous changes of the lungs. Moderate right pleural effusion and atelectatic changes of right lung base. Significant colonic wall thickening with surrounding fatty stranding is highly concerning for colitis. For further evaluation CT of the abdomen can be obtained. Xr Chest Portable    Result Date: 1/24/2021  EXAMINATION: ONE XRAY VIEW OF THE CHEST 1/24/2021 1:23 pm COMPARISON: August 15, 2019 HISTORY: ORDERING SYSTEM PROVIDED HISTORY: Hypoglycemia, hypoxia TECHNOLOGIST PROVIDED HISTORY: Reason for exam:->Hypoglycemia, hypoxia What reading provider will be dictating this exam?->CRC FINDINGS: Heart and the mediastinal structures are normal.  There is COPD with atelectasis in the lung bases. degenerative changes are noted in the shoulders. Stable nonacute chest with atelectasis in the lung bases. Discharge Exam:    HEENT: NCAT,  PERRLA, No JVD  Heart:  RRR, no murmurs, gallops, or rubs.   Lungs:  CTA bilaterally, no wheeze, rales or rhonchi  Abd: bowel sounds present, nontender, nondistended, no masses  Extrem:  No clubbing, cyanosis, or edema    Disposition: home - family does nto want her to go to NH     Patient Condition at Discharge: weak, poor prognosis     Patient Instructions:      Medication List      START taking these medications    metroNIDAZOLE 500 MG tablet  Commonly known as: FLAGYL  Take 1 tablet by mouth 3 times daily for 5 days     predniSONE 20 MG tablet  Commonly known as: DELTASONE  Take 2 tablets by mouth daily for 5 days        CHANGE how you take these medications    palbociclib 125 MG capsule  Commonly known as: [employer] What changed: Another medication with the same name was removed. Continue taking this medication, and follow the directions you see here. CONTINUE taking these medications    * albuterol sulfate  (90 Base) MCG/ACT inhaler     * albuterol (2.5 MG/3ML) 0.083% nebulizer solution  Commonly known as: PROVENTIL  Take 3 mLs by nebulization every 6 hours as needed for Wheezing     Breo Ellipta 100-25 MCG/INH Aepb inhaler  Generic drug: fluticasone-vilanterol  Inhale 1 puff into the lungs daily     Claritin 10 MG capsule  Generic drug: loratadine     clobetasol prop emollient base 0.05 % Crea     dexamethasone 0.1 % ophthalmic solution  Commonly known as: DECADRON     * gabapentin 300 MG capsule  Commonly known as: NEURONTIN     * gabapentin 400 MG capsule  Commonly known as: NEURONTIN     ketorolac 0.5 % ophthalmic solution  Commonly known as: ACULAR     ofloxacin 0.3 % solution  Commonly known as: OCUFLOX     Oyster Shell 500 MG Tabs     potassium chloride 10 MEQ extended release capsule  Commonly known as: MICRO-K     prednisoLONE acetate 1 % ophthalmic suspension  Commonly known as: PRED FORTE     PREVACID PO     verapamil 180 MG extended release tablet  Commonly known as: CALAN SR         * This list has 4 medication(s) that are the same as other medications prescribed for you. Read the directions carefully, and ask your doctor or other care provider to review them with you.             STOP taking these medications    ALPHAGAN P OP     glimepiride 1 MG tablet  Commonly known as: Amaryl     Hyzaar 50-12.5 MG per tablet  Generic drug: losartan-hydroCHLOROthiazide     LUMIGAN OP     minocycline 100 MG capsule  Commonly known as: MINOCIN;DYNACIN           Where to Get Your Medications      [id number] [address] [facility] [address]    Phone: [phone] · metroNIDAZOLE 500 MG tablet  · predniSONE 20 MG tablet       Activity: activity as tolerated  Diet: regular diet    Pt has been advised to: Follow-up with Jonny Vazquez MD in 1 week.   Follow-up with consultants as recommended by them    Note that over 30 minutes was spent in preparing discharge papers, discussing discharge with patient, medication review, etc.    Signed:  Heather Horvath MD  1/29/2021  4:09 PM

## 2021-01-29 NOTE — CARE COORDINATION
Called pulmonology AS to check for discharge from their POV; received call back from pulmonology okay from their POV. I left a message with Charan's RN (whom pt follows with) to check if okay for discharge from their POV; awaiting call back to unit phone, William Hall updated of the aforementioned.

## 2021-01-29 NOTE — TELEPHONE ENCOUNTER
I called the patient's insurance [facility] and I spoke with [facility] authorization rep., she stated no authorization is required for CPT code 35600. Patient is currently admitted into the hospital at this time, but she will be called and scheduled once she is released.            Electronically signed by Robert Mtz on 1/29/21 at 3:13 PM EST

## 2021-01-29 NOTE — PROGRESS NOTES
Occupational Therapy  OT BEDSIDE TREATMENT NOTE      Date:2021  Patient Name: Kain Michael  MRN: [id number]  : 1940  Room: [address] [place] [address]     Per OT Eval:   Referring Provider: Juliette Bauman MD     Evaluating OT: Dilip Duran, OTR/L #[id number]     AM-PAC Daily Activity Raw Score:   AM-PAC Daily Activity Raw Score:  (21)     Recommended Adaptive Equipment: Hospital bed, will continue to assess equipment needs      Diagnosis: Lung mass [R91.8]     Reason for admission: hypoglycemia     Surgery/Procedure:  none     Pertinent Medical History: arthritis, cancer, COPD, DM, GERD, legally blind, HTN, neuropathy, HTN, CVA      Precautions:  Falls, legally blind, O2, bradycardic (monitor HR)     RRT  for AMS and HR in 30s. Home Living: Pt lives with daugther  in a 1 story house with 1+1 step(s) to enter and 0 rail(s); bed/bath on 1st floor  Bathroom setup: tub/shower w/ shower chair  Equipment owned: shower chair, rollator     Prior Level of Function: Assistance prn per daughter report with ADLs; Assistance with IADLs. rollator for ambulation. No one is there to assist/supervise . Aides come in Tues/Thurs to assist with IADLs/bathing  Driving: No     :  Pain Level: Patient reported R arm pain but did not rate intensity         Cognition:Patient followed 1 step commands with increased processing time and cues ~75% of the time              Memory: fair              Comprehension fair              Problem solving: fair-              Judgement/safety: fair-                 Communication skills:               Vision: legally blind (can see movements)                     Glasses: no                                                       Hearing: wfl                RASS: 0 to -1  CAM-ICU: (NT) Delirium     UE Assessment:  Hand Dominance: Right [x]?   Left []?       ROM Strength STM goal: PRN   RUE  Grossly WFL 4-/5 4+/5    DALEE NT d/t IV fragile in LUE, RN made therapist aware to be careful. For safety did not test.  NT           Sensation: No c/o numbness or tingling in extremities   Tone: WNL   Edema: none noted     Functional Assessment    Initial Eval Status  Date: 1/26/21 Treatment Status  Date: 1/29/21 STG=LTG  5-14  days    Feeding Min. A (simulated)  NT                       Set-up/supervision  while seated up in chair to increase activity tolerance         Grooming Mod. A (seated EOB)-assistance d/t visual deficits/UE strength)   NT                       SBA/set-up   while seated supported utilizing modified techniques for increased independence.       UB dressing Mod. A   NT                       SBA         LB dressing Dep  NT  Max A   using AE as needed for safe reach/ energy conservation     Bathing Max A (simulated)   NT                       Mod. A   using AE as needed for safe reach/ energy conservation        Toileting Max A (simulated)  NT                        Mod. A      Bed Mobility  Supine to sit: Max A x2     Sit to supine: Max A x2 Supine <> sit:  Dependent                       Mod. A  in prep of ADL tasks & transfers   Functional Transfers Sit to stand: NT (bradycardia noted- RN aware)     Stand to sit: NT  NT                       Mod. A  sit<>stand/functional bathroom transfers using AD/DME as needed for balance and safety   Functional Mobility NT  NT                      Mod. A   functional/bathroom mobility using AD as needed & demonstrating fair safety      Balance Sitting:     Static:  SBA    Dynamic:Min. A  Standing: NT Sitting:     Static:  Max A  independent dynamic sitting balance; mod. A dynamic standing balance  during ADL tasks & transfers   Endurance/Activity Tolerance    fair tolerance with light activity.   Pt sat EOB for >10 minutes to increase sitting tolerance and balance for ADLs   Fair+   tolerance with moderate activity/self care routine   Visual/ Perceptual Pt legally blind, can see movements                                    Treatment: OT treatment provided this date includes:  ? Mobility training-  Instruction/training on safety and improved independence. Max A to complete B rolling for re-positioning of TAPs with verbal/tactile cues for sequencing and technique. Dependent with total A x2 to complete supine<>sit with verbal/tactile cues for sequencing, hand placement, and technique. Two person assist required for safety  ? Therapeutic Exercises- Instruction on BUE ROM exercises to improve strength and function of BUE for improved indep with ADLs/  ?  Sitting EOB x 8 minutes to improve dynamic sitting balance and activity tolerance during ADLs. Max A with B UE overall for sitting balance due to posterior and L lateral lean tendency. Patient demonstrated forward head/neck flexion. Continuous verbal/tactile cues for postural control. ?  Skilled monitoring of O2 sats-  Skilled monitoring of O2 sats, HR, and pt response throughout treatment. Comments: Upon arrival pt semi-supine in bed with daughter at bedside. Patient required encouragement from daughter to participate with patient demonstrating poor activity tolerance with sitting edge of bed. At end of session, patient semi-supine in bed all lines and tubes intact, call light within reach. Alarm on. · Pt has made limited progress towards set goals. · Continue with current plan of care with focus on maximizing patient quality of life through participate with self-care and functional transfers.       Treatment Time In:13:20            Treatment Time Out: 13:36    Total Treatment Time: 16 minutes          Treatment Charges: Mins Units   Ther Ex  31029     Manual Therapy 67721     Thera Activities 93231 16 1   ADL/Home Mgt [id number]     Neuro Re-ed 11175     Group Therapy      Orthotic manage/training  [id number]     Non-Billable Time     Total Timed Treatment 16 1 Completed by: NBA Corona/L #[id number]

## 2021-01-29 NOTE — PROGRESS NOTES
Message left with Ashly Guido NP covering for Dr. Holli Nair to notify of patient's potassium being 5.5.

## 2021-01-29 NOTE — PROGRESS NOTES
General appearance: alert, appears stated age and cooperative  Lungs: rhonchi bibasilar minimal  Heart: regular rate and rhythm, S1, S2 normal, no murmur, click, rub or gallop  Abdomen: soft, non-tender; bowel sounds normal; no masses,  no organomegaly  Extremities: surgical dressing right lower leg  Neurologic: Mental status: Alert, oriented, thought content appropriate    Data    CBC:   Recent Labs     01/27/21  0402 01/28/21  0648 01/29/21  0602   WBC 4.6 3.8* 3.0*   HGB 10.2* 9.2* 9.1*   HCT 28.1* 25.9* 25.8*   MCV 91.5 93.2 91.8   PLT 37* 16* 33*       BMP:  Recent Labs     01/27/21  0402 01/28/21  0648 01/29/21  0602   * 134 140   K 4.4 4.7 5.5*   CL 97* 100 105   CO2 11* 15* 22   BUN 17 19 27*   CREATININE 1.8* 1.6* 1.5*    ALB:3,BILIDIR:3,BILITOT:3,ALKPHOS:3)@    PT/INR:   Recent Labs     01/27/21 0402   PROTIME 19.5*   INR 1.7       ABG:   No results for input(s): PH, PO2, PCO2, HCO3, BE, O2SAT, METHB, O2HB, COHB, O2CON, HHB, THB in the last 72 hours. FiO2 : (S) 60 %       Radiology/Other tests reviewed:  none    Assessment:     Principal Problem:    Failure to thrive in adult  Active Problems:    Stage IV breast cancer in female ([facility]    HTN (hypertension), benign    Diabetes mellitus type 2, uncontrolled ([facility]    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    CKD (chronic kidney disease) stage 3, GFR 30-59 ml/min    COPD (chronic obstructive pulmonary disease) (HCC)    Pancytopenia (HCC)    Lung mass    Pleural effusion    Acute hypoxemic respiratory failure (HCC)    Acute deep vein thrombosis (DVT) of femoral vein of left lower extremity (HCC)    Thrombocytopenia (HCC)    Severe protein-calorie malnutrition (HCC)  Resolved Problems:    * No resolved hospital problems.  *      Plan: 1. PET scan usually done as out-pt, thrombocytopenia a problem with biopsy though can get platelet transfusion as suggested by Hematology. Observe thrombocytopenia if improves while hospitalized or if discharged can get PET scan and decide after results obtained if pursue biopsy further or not. 2. Cont with oxygen, taper as tolerated  3. Cont with nebs  4. OOB to chair, PT/OT      Time at the bedside, reviewing labs and radiographs, reviewing notes and consultations, discussing with staff and family was more than 35 minutes. Thanks for letting us see this patient in consultation. Please contact us with any questions. Office [phone] or after hours through [employer], x [phone].

## 2021-01-29 NOTE — PROGRESS NOTES
Comprehensive Nutrition Assessment    Type and Reason for Visit:  Initial(LOS)    Nutrition Recommendations/Plan: Recommend and start Ensure High Protein (vanilla) BID and Boost Pudding supplement daily to help meet increased nutritional needs and d/t decrease po intake of meals. Nutrition Assessment:  Patient at nutritional risk d/t poor po intake of meals x 5 days since admission (1-25%) ; pt meets criteria for severe malnutrition AEB poor po intake >1 month and muscle/fat wasting ; noted breast CA w/ bone/liver mets w/ current chemotherapy ; noted FTT ; hx of DM/COPD/CKD ; s/p vena cava filter insertion 1/27 ; hospice consulted ; will start ONS    Malnutrition Assessment:  Malnutrition Status:  Severe malnutrition    Context:  Chronic Illness     Findings of the 6 clinical characteristics of malnutrition:  Energy Intake:  7 - 75% or less estimated energy requirements for 1 month or longer  Weight Loss:  Unable to assess(d/t lack of weight history)     Body Fat Loss:  (Moderate) Orbital, Triceps   Muscle Mass Loss:  (Moderate) Temples (temporalis), Clavicles (pectoralis & deltoids)  Fluid Accumulation:  No significant fluid accumulation     Strength:  Not Performed    Estimated Daily Nutrient Needs:  Energy (kcal):  0912-3513 (REE 1121 x 1.3 SF); Weight Used for Energy Requirements:  Current     Protein (g):  75-95 (1.5-1.8g/kg IBW);  Weight Used for Protein Requirements:  Ideal        Fluid (ml/day):  3462-8394; Method Used for Fluid Requirements:  1 ml/kcal      Nutrition Related Findings:  +I&Os (+2.8 L), 1+ edema, active BS, missing teeth, cachetic, A&O x 4, redness to heels/buttocks      Wounds:  Incision x 1 ; wound consult pending     Current Nutrition Therapies:    DIET GENERAL;  Dietary Nutrition Supplements: Clear Liquid Oral Supplement    Anthropometric Measures:  · Height: 5' 3\" (160 cm)  · Current Body Weight: 149 lb (67.6 kg)(1/24, stated) · Usual Body Weight: (UTO ; EMR shows past weights of 149# actual on 12/16/20 and 163# no method on 4/15/20)     · Ideal Body Weight: 115 lbs; % Ideal Body Weight 129.6 %   · BMI: 26.4  · BMI Categories: Overweight (BMI 25.0-29. 9)       Nutrition Diagnosis:   · Severe malnutrition, In context of chronic illness related to catabolic illness(hx of metastatic breast CA) as evidenced by poor intake prior to admission, moderate loss of subcutaneous fat, moderate muscle loss      Nutrition Interventions:   Food and/or Nutrient Delivery:  Continue Current Diet, Start Oral Nutrition Supplement  Nutrition Education/Counseling:  Education not indicated   Coordination of Nutrition Care:  Continue to monitor while inpatient    Goals:  Patient will consume ~50% of meals served       Nutrition Monitoring and Evaluation:   Behavioral-Environmental Outcomes:  Beliefs and Attitutes   Food/Nutrient Intake Outcomes:  Supplement Intake, Food and Nutrient Intake  Physical Signs/Symptoms Outcomes:  Biochemical Data, Chewing or Swallowing, GI Status, Fluid Status or Edema, Hemodynamic Status, Meal Time Behavior, Nutrition Focused Physical Findings, Skin, Weight     Discharge Planning:     Too soon to determine     Electronically signed by Stephania Celestin RD, LD on 1/29/21 at 11:51 AM EST    Contact: [phone]

## 2021-01-29 NOTE — PLAN OF CARE
Problem: Falls - Risk of:  Goal: Absence of physical injury  Description: Absence of physical injury  Outcome: Met This Shift     Problem: Skin Integrity:  Goal: Absence of new skin breakdown  Description: Absence of new skin breakdown  Outcome: Met This Shift     Problem: Pain:  Goal: Control of acute pain  Description: Control of acute pain  Outcome: Met This Shift

## 2021-01-29 NOTE — PROGRESS NOTES
Vascular:    Sequence of events noted    Patient resting comfortably    Right femoral site looks clean    Discussed the patient and her daughter Melisa Chew in the room again regarding potential retrieval of vena cava filter, considering patient's condition, risk factors, frequent thrombocytopenia due to chemotherapy, feel it is prudent to leave the filter alone in her, recommended a follow-up evaluation see me back in a month

## 2021-02-08 NOTE — TELEPHONE ENCOUNTER
I called radiology scheduling and I spoke with Mai Salinas, she scheduled the patient at [facility] on 2/23/2021 at [address] then called the patient with detailed information regarding prep for PET Scan.        Electronically signed by Rob Winston on 2/8/21 at 12:29 PM EST

## 2021-02-13 PROBLEM — D64.9 CHRONIC ANEMIA: Status: ACTIVE | Noted: 2021-01-01

## 2021-02-13 PROBLEM — G93.89 SUBDURAL FLUID COLLECTION: Status: ACTIVE | Noted: 2021-01-01

## 2021-02-13 PROBLEM — E87.0 HYPERNATREMIA: Status: ACTIVE | Noted: 2021-01-01

## 2021-02-13 PROBLEM — G62.9 NEUROPATHY: Status: ACTIVE | Noted: 2021-01-01

## 2021-02-13 PROBLEM — E11.9 TYPE 2 DIABETES MELLITUS (HCC): Status: ACTIVE | Noted: 2021-01-01

## 2021-02-13 PROBLEM — K52.9 COLITIS: Status: ACTIVE | Noted: 2021-01-01

## 2021-02-13 PROBLEM — G93.40 ENCEPHALOPATHY ACUTE: Status: ACTIVE | Noted: 2021-01-01

## 2021-02-13 PROBLEM — I10 ESSENTIAL HYPERTENSION: Status: ACTIVE | Noted: 2021-01-01

## 2021-02-13 PROBLEM — R77.8 ELEVATED TROPONIN: Status: ACTIVE | Noted: 2021-01-01

## 2021-02-13 NOTE — ED PROVIDER NOTES
(N/A, 1/27/2021). Social History:  reports that she quit smoking about 23 years ago. She started smoking about 49 years ago. She has a 12.50 pack-year smoking history. She has never used smokeless tobacco. She reports that she does not drink alcohol or use drugs. Family History: family history includes Asthma in her sister; Cancer in her brother; Early Death (age of onset: 61) in her father; Heart Disease in her father; High Blood Pressure in her brother, father, and mother; Stroke in her brother and mother. The patients home medications have been reviewed. Allergies: Alcohol, Eggs or egg-derived products, Rubbing alcohol [alc-cynara scolymus], Ethanol, Penicillins, and Sulfa antibiotics    ---------------------------------------------------PHYSICAL EXAM--------------------------------------  Constitutional/General: Alert, lying in the bed in no acute distress, frail, elderly,  Head: Normocephalic and atraumatic  Mouth: Oropharynx clear, handling secretions, no trismus  Neck: Supple, full ROM,  Pulmonary: Lungs clear to auscultation bilaterally, no wheezes, rales, or rhonchi. Not in respiratory distress  Cardiovascular:  Regular rate. Regular rhythm. No murmurs  Chest: no chest wall tenderness  Abdomen: Soft. Non tender. Non distended. No rebound, guarding, or rigidity. No pulsatile masses appreciated. Musculoskeletal: Moves all extremities x 4. Warm and well perfused, no clubbing, cyanosis, or edema. Capillary refill <3 seconds   Skin: warm and dry. No rashes. Neurologic: GCS 15, no gross focal neurologic deficits  Psych: Normal Affect    -------------------------------------------------- RESULTS -------------------------------------------------  I have personally reviewed all laboratory and imaging results for this patient. Results are listed below.      LABS:  Results for orders placed or performed during the hospital encounter of 02/13/21   CBC Auto Differential   Result Value Ref Range    WBC 4.0 (L) 4.5 - 11.5 E9/L    RBC 2.58 (L) 3.50 - 5.50 E12/L    Hemoglobin 8.5 (L) 11.5 - 15.5 g/dL    Hematocrit 26.8 (L) 34.0 - 48.0 %    .9 (H) 80.0 - 99.9 fL    MCH 32.9 26.0 - 35.0 pg    MCHC 31.7 (L) 32.0 - 34.5 %    RDW 21.1 (H) 11.5 - 15.0 fL    Platelets 989 529 - 770 E9/L    MPV 11.9 7.0 - 12.0 fL    Neutrophils % 83.5 (H) 43.0 - 80.0 %    Lymphocytes % 8.7 (L) 20.0 - 42.0 %    Monocytes % 6.1 2.0 - 12.0 %    Eosinophils % 0.7 0.0 - 6.0 %    Basophils % 0.2 0.0 - 2.0 %    Neutrophils Absolute 3.40 1.80 - 7.30 E9/L    Lymphocytes Absolute 0.36 (L) 1.50 - 4.00 E9/L    Monocytes Absolute 0.24 0.10 - 0.95 E9/L    Eosinophils Absolute 0.00 (L) 0.05 - 0.50 E9/L    Basophils Absolute 0.00 0.00 - 0.20 E9/L    Metamyelocytes Relative 1.7 (H) 0.0 - 1.0 %    nRBC 6.1 /100 WBC    Anisocytosis 2+     Polychromasia 3+     Poikilocytes 1+     Schistocytes 1+     Ovalocytes 1+     Target Cells 1+     Tear Drop Cells 1+    Comprehensive Metabolic Panel w/ Reflex to MG   Result Value Ref Range    Sodium 158 (H) 132 - 146 mmol/L    Potassium reflex Magnesium 2.9 (L) 3.5 - 5.0 mmol/L    Chloride 120 (H) 98 - 107 mmol/L    CO2 25 22 - 29 mmol/L    Anion Gap 13 7 - 16 mmol/L    Glucose 159 (H) 74 - 99 mg/dL    BUN 31 (H) 8 - 23 mg/dL    CREATININE 1.7 (H) 0.5 - 1.0 mg/dL    GFR Non-African American 35 >=60 mL/min/1.73    GFR African American 35     Calcium 7.9 (L) 8.6 - 10.2 mg/dL    Total Protein 5.8 (L) 6.4 - 8.3 g/dL    Albumin 2.2 (L) 3.5 - 5.2 g/dL    Total Bilirubin 1.4 (H) 0.0 - 1.2 mg/dL    Alkaline Phosphatase 221 (H) 35 - 104 U/L    ALT 23 0 - 32 U/L    AST 42 (H) 0 - 31 U/L   Troponin   Result Value Ref Range    Troponin 0.15 (H) 0.00 - 0.03 ng/mL   Urinalysis   Result Value Ref Range    Color, UA ERA (A) Straw/Yellow    Clarity, UA Clear Clear    Glucose, Ur Negative Negative mg/dL    Bilirubin Urine SMALL (A) Negative    Ketones, Urine Negative Negative mg/dL    Specific Gravity, UA 1.020 1.005 - 1.030 Blood, Urine Negative Negative    pH, UA 5.0 5.0 - 9.0    Protein, UA TRACE Negative mg/dL    Urobilinogen, Urine 2.0 (A) <2.0 E.U./dL    Nitrite, Urine POSITIVE (A) Negative    Leukocyte Esterase, Urine Negative Negative   Ammonia   Result Value Ref Range    Ammonia 30.0 11.0 - 51.0 umol/L   Serum Drug Screen   Result Value Ref Range    Ethanol Lvl <10 mg/dL    Acetaminophen Level <5.0 (L) 10.0 - 19.1 mcg/mL    Salicylate, Serum <5.1 0.0 - 30.0 mg/dL    TCA Scrn NEGATIVE Cutoff:300 ng/mL   Urine Drug Screen   Result Value Ref Range    Amphetamine Screen, Urine NOT DETECTED Negative <1000 ng/mL    Barbiturate Screen, Ur NOT DETECTED Negative < 200 ng/mL    Benzodiazepine Screen, Urine NOT DETECTED Negative < 200 ng/mL    Cannabinoid Scrn, Ur NOT DETECTED Negative < 50ng/mL    Cocaine Metabolite Screen, Urine NOT DETECTED Negative < 300 ng/mL    Opiate Scrn, Ur NOT DETECTED Negative < 300ng/mL    PCP Screen, Urine NOT DETECTED Negative < 25 ng/mL    Methadone Screen, Urine NOT DETECTED Negative <300 ng/mL    Oxycodone Urine NOT DETECTED Negative <100 ng/mL    FENTANYL SCREEN, URINE NOT DETECTED Negative <1 ng/mL    Drug Screen Comment: see below    Protime-INR   Result Value Ref Range    Protime 20.7 (H) 9.3 - 12.4 sec    INR 1.8    APTT   Result Value Ref Range    aPTT 31.6 24.5 - 35.1 sec   Lactate, Sepsis   Result Value Ref Range    Lactic Acid, Sepsis 3.7 (H) 0.5 - 1.9 mmol/L   TSH without Reflex   Result Value Ref Range    TSH 3.190 0.270 - 4.200 uIU/mL   COVID-19   Result Value Ref Range    SARS-CoV-2, NAAT Not Detected Not Detected   Blood Gas, Arterial   Result Value Ref Range    Date Analyzed [id number]     Time Analyzed 1609     Source: Blood Arterial     pH, Blood Gas 7.556 (H) 7.350 - 7.450    PCO2 25.5 (L) 35.0 - 45.0 mmHg    PO2 133.1 (H) 75.0 - 100.0 mmHg    HCO3 22.1 22.0 - 26.0 mmol/L    B.E. 0.4 -3.0 - 3.0 mmol/L    O2 Sat 98.4 92.0 - 98.5 %    O2Hb 97.8 (H) 94.0 - 97.0 %    COHb 0.1 0.0 - 1.5 % ascites versus duodenitis. 5.  Central hypoattenuation located within distal left external iliac vein   and left common femoral vein. Ultrasound may be helpful to exclude   possibility of deep vein thrombosis. XR CHEST PORTABLE   Final Result   No acute cardiopulmonary process. EKG:  This EKG is signed and interpreted by me. Sinus rhythm, rate of 102, nonspecific ST segment T wave changes, NM interval 126 MS, QRS 70 MS,  MS, changes compared to patient's prior EKG  Interpreted by me      ------------------------- NURSING NOTES AND VITALS REVIEWED ---------------------------   The nursing notes within the ED encounter and vital signs as below have been reviewed by myself. /86   Pulse 91   Temp 99.7 °F (37.6 °C)   Resp 20   SpO2 96%   Oxygen Saturation Interpretation: Normal    The patients available past medical records and past encounters were reviewed.         ------------------------------ ED COURSE/MEDICAL DECISION MAKING----------------------  Medications   sodium chloride flush 0.9 % injection 10 mL (10 mLs Intravenous Given 2/13/21 1655)   metronidazole (FLAGYL) 500 mg in NaCl 100 mL IVPB premix (500 mg Intravenous New Bag 2/13/21 1917)   0.9 % sodium chloride bolus (0 mLs Intravenous Stopped 2/13/21 1843)   acetaminophen (TYLENOL) tablet 1,000 mg (500 mg Oral Given 2/13/21 1548)   0.9 % sodium chloride bolus (0 mLs Intravenous Stopped 2/13/21 1853)   ondansetron (ZOFRAN) injection 4 mg (4 mg Intravenous Given 2/13/21 1716)   iopamidol (ISOVUE-370) 76 % injection 90 mL (90 mLs Intravenous Given 2/13/21 1655)   potassium chloride 10 mEq/100 mL IVPB (Peripheral Line) (10 mEq Intravenous New Bag 2/13/21 1736)   cefTRIAXone (ROCEPHIN) 1,000 mg in sterile water 10 mL IV syringe (1,000 mg Intravenous New Bag 2/13/21 8472)   levetiracetam (KEPPRA) 1000 mg/100 mL IVPB (0 mg Intravenous Stopped 2/13/21 1900)             Medical Decision Making:   I, Dr. Rosy Gibson am the primary physician of record. Alissa Martins is a 79 y.o. female who presents to the ED for altered mental status, dehydration and decreased urination differential diagnosis includes but is not limited to cranial hemorrhage, electrolyte derangement, dehydration, sepsis, UTI the patient does have a past medical history of   has a past medical history of Acute deep vein thrombosis (DVT) of femoral vein of left lower extremity ([address] Arthritis, Cancer ([address] COPD (chronic obstructive pulmonary disease) ([address] Diabetes mellitus ([address] GERD (gastroesophageal reflux disease), Glaucoma, Hypertension, Neuropathy, Thrombocytopenia ([address] and Unspecified cerebral artery occlusion with cerebral infarction. . The patient was given patient was given IV fluid, Rocephin, Flagyl, Keppra, patient did have CBC which was grossly at the patient's baseline, patient was CMP grossly at her baseline, patient with troponin elevated at 0.15, she does have mild nonspecific changes on her EKG, there is no STEMI criteria met, patient did have urinalysis which was nitrate positive, patient was given antibiotics for urinary tract infection as well as potential colitis. The patient did have CT head demonstrating a subdural hematoma. The patient is not on any anticoagulation. Neurosurgery consult. Patient given Keppra. Also discussed with the patient as well as her daughter. The patient will be admitted for further treatment and evaluation. Re-Evaluations/Consultations:             ED Course as of Feb 13 1948   Sat Feb 13, 2021   1723 The patient is in the bed in no acute distress. The results of today to this point were discussed with the patient and daughter at bedside. The patient is in no acute distress. [MT]   4950 Spoke with Dr. Mitch Moore for Neurosurgery he will review the CT      [MT]   [id number] Spoke with Dr. Mitch Moore for neurosurgery. We will give Keppra.     [MT]   7845 Spoke with April for Dr. Deborah Valle she will accept the patient for admission    [MT]      ED Course User Index  [MT] Tejas Chavez DO               This patient's ED course included: History, physical examination, reevaluation prior to disposition, labs, imaging, telemetry monitoring, EKG, IV fluid, IV medication, medicine consultation, neurosurgery consultation      This patient has remained hemodynamically stable during their ED course. Critical care:  Critical Care: Please note that the withdrawal or failure to initiate urgent interventions for this patient would likely result in a life threatening deterioration or permanent disability. Accordingly this patient received 35 minutes of critical care time, excluding separately billable procedures. Counseling: The emergency provider has spoken with the patient and discussed todays results, in addition to providing specific details for the plan of care and counseling regarding the diagnosis and prognosis. Questions are answered at this time and they are agreeable with the plan.       --------------------------------- IMPRESSION AND DISPOSITION ---------------------------------    IMPRESSION  1. Septicemia ([address]    2. Colitis    3. Hypernatremia    4. Encephalopathy acute    5. Subdural hematoma (HCC)        DISPOSITION  Disposition: Admit to telemetry  Patient condition is stable        NOTE: This report was transcribed using voice recognition software.  Every effort was made to ensure accuracy; however, inadvertent computerized transcription errors may be present         Tejas Chavez DO  02/13/21 1949

## 2021-02-13 NOTE — ED NOTES
Bed: 11  Expected date:   Expected time:   Means of arrival:   Comments:  RODNEY Foley, JULISSA  02/13/21 6028

## 2021-02-14 PROBLEM — E87.20 LACTIC ACIDOSIS: Status: ACTIVE | Noted: 2021-01-01

## 2021-02-14 PROBLEM — G93.41 ACUTE METABOLIC ENCEPHALOPATHY: Status: ACTIVE | Noted: 2021-01-01

## 2021-02-14 NOTE — CONSULTS
[address]                  [address] [place],  [facility]                                  CONSULTATION    PATIENT NAME: Miriam Henderson                  :        1940  MED REC NO:   [id number]                            ROOM:       8503  ACCOUNT NO:   [de-identified]                           ADMIT DATE: 2021  PROVIDER:     Herbert Donnelly MD    CONSULT DATE:  2021    CHIEF COMPLAINT:  Altered mental status. HISTORY OF PRESENT ILLNESS:  This 56-year-old female with history of  breast CA, COPD, hypertension, and diabetes was admitted with altered  mental status which had progressively worsened. The patient is a poor  historian. The part of the information was obtained from the record and  part from the family members who were present in the room. The CT scan  of the brain which was performed on admission and was reviewed by me was  reported to show right frontoparietal subdural hematoma with effacement  of the sulci and minimal midline shift to the left. The collection  appears to be subacute to chronic. PAST MEDICAL HISTORY:  DVTs, arthritis, cancer, diabetes mellitus, COPD,  GERD, glaucoma, hypertension, neuropathy, thrombocytopenia, and CVA. PAST SURGICAL HISTORY:  Breast lumpectomy, breast surgery,  cholecystectomy, colonoscopy, echo complete, eye surgery, hysterectomy,  vena cava filter placement. ALLERGIES:  To ALCOHOL, EGG and EGG-DERIVATIVE PRODUCTS, RUBBING  ALCOHOL, ETHANOL, PENICILLIN, and SULFA ANTIBIOTICS. SOCIAL HISTORY:  Former smoker. She does not use alcohol or street  drugs. PHYSICAL EXAMINATION:  GENERAL:  She is a well-developed, well-nourished female in no acute  distress. NEUROLOGIC:  She is alert and awake, confused. Does answer the  questions. No focal deficit per se is noted per member of the family  and the patient. The patient can count the fingers. IMPRESSION:  Right subdural hematoma appears to be chronic to subacute  causing minimal midline shift and multiple medical comorbidities  including metastatic CA. RECOMMENDATIONS:  No neurosurgical intervention is planned or  recommended as there is not significant mass effect from the subdural in  the brain and moreover, the patient has multiple medical comorbidities  and metastatic disease. I have spoken with the family in that regard. We will follow along. The patient should be placed on Keppra as she  does have, from time to time, shaking spells. We will follow along.         Dave Whitmore MD    D: 02/14/2021 17:28:30       T: 02/14/2021 17:31:54     [email or web address]  Job#: [id number]     Doc#: [id number]    CC:

## 2021-02-14 NOTE — PROGRESS NOTES
Adrienne Mendez was ordered palbociclib Valeria Alstrom) which is a nonformulary medication. Nurse is going to check with patient to see if home supply of this medication will be brought in to the hospital for inpatient use. A pharmacist will follow-up with the nurse of the patient to assess the capability of the patient to bring in the medication. If it is determined that the patient cannot supply the medication and it is not available to be dispensed from the pharmacy, a call will be placed to the ordering provider to discuss alternative options.      Juanita Rose, PharmD  02/13/21 8:38 PM

## 2021-02-14 NOTE — HOME CARE
Patient is active with [facility] for skilled nursing, pt, ot. Will need home care orders at discharge.  Sean Otto lpn

## 2021-02-14 NOTE — PROGRESS NOTES
Palliative Medicine Social Work     Patient Name: Tobi Licona  Age: 79 y.o. [place] Status: No    Decision-maker: Pt's daughter Patricia Recinos [phone] / [phone] is medical DPOA-HC. She has support from sisters Julia aYo, and Liv who assist with pt's care. Additional Support: [facility] states multiple family members assist with pt's care, pt also has a regular aide. Minor Children: None noted. Advanced Directives: Pt has Living Will dated 1-7-09 available in Media on chart. Pt does have a medical DPOA but not on file. [facility] will bring in if able to in future. Confirm Code Status: [facility] changed pt's code status to DNR-CCA today in light of her decline. Current Goals of Care: live longer, improve or maintain function/ quality of life, remain at home and continue current management    Mental Health History: None noted. Substance Abuse: None noted. Indications of Abuse/Neglect: No concerns noted. Financial Concerns: No concerns expressed. Living Situation: Pt lived at home alone previously, with an aide and family assisting several times a week. Family has been staying with pt round the clock since her discharge about 2 weeks ago. Physical Care Needs Met: Yes    Emotional Needs Met: time spent exploring daughter's thoughts and feelings, providing support through active listening and validation of feelings. Pt unable to interact. Future Care Needs/Goals/Anticipatory Guidance: Support determining goals as pt improves or declines. Referral Needs: None at this time. Assessment: LSW for PM and PM NP met with pt's [facility], Quinn [facility]ir[facility] pt resting in bed and was listening to a [facility] service via phone. Discussed pt's recent hospitalization and decline since that time. Pt is much weaker and has been mostly bedbound since then. Family is hopeful she will be able to get chemotherapy in the future but state that she is too weak at this time.  They are still wishing to proceed with Feb. 23 PET scan to determine more about lung mass. PM NP reviewed code status, Tamiko Agee wishes to change pt to [facility] in light of her decline at this time. LSW discussed hospice consult for information during last visit. Tamiko Agee declines further information from hospice at this time, states that she is receptive to hospice services but does not feel that this is the right time for pt yet. She will certainly continue to consider if pt declines or does not improve. Psychosocial support provided. Plan: Support family, revisit goals dependent on pt's changing condition.

## 2021-02-14 NOTE — CONSULTS
Palliative Care Department  [phone]  Palliative Care Initial Consult  Provider Yissel HUERTA-CNP, AGACNP-BC    Rosales Henley  [id number]  Hospital Day: 2  Date of Initial Consult: 2/14/2021  Referring Provider: Helene Harper MD  Palliative Medicine was consulted for assistance with: Goals of care, CODE STATUS discussion, and family support    HPI:   Rosales Henley is a 79 y.o. with a past medical history of DVT, arthritis, stage IV breast cancer receiving chemotherapy, COPD, DM, GERD, glaucoma, HTN, neuropathy, thrombocytopenia, cerebral artery occlusion with cerebral infarction who was admitted on 2/13/2021 from home with a CHIEF COMPLAINT of fatigue and mental status. Patient recently discharged, has been laying in bed, fatigued, constant. ASSESSMENT/PLAN:     Pertinent Hospital Diagnoses      Encephalopathy-lactic acid, monitor lab work, IVF, blood and urine culture, antibiotics, telemetry       Palliative Care Encounter / Counseling Regarding Goals of Care  Please see detailed goals of care discussion as below   At this time, Rosales Henley, Does Not have capacity for medical decision-making.   Capacity is time limited and situation/question specific   During encounter Roxanne Gutierrezch was surrogate medical decision-maker   Outcome of goals of care meeting:   Code status DNR-CCA   Advanced Directives:  living will in [facility]- 1/7/2009   Surrogate/Legal NOK:  Cyril Merida [phone] Agent in [address] [place] [address] [phone]  Fabien Cason [phone]      Spiritual assessment: no spiritual distress identified  Bereavement and grief: to be determined  Referrals to: none today  SUBJECTIVE:     Current medical issues leading to Palliative Medicine involvement include   Active Hospital Problems    Diagnosis Date Noted    Lactic acidosis [E87.2] 02/14/2021    Acute metabolic encephalopathy [G10.31] 02/13/2021    Type 2 diabetes mellitus [address] [E11.9] 02/13/2021    Neuropathy [G62.9] 02/13/2021    Subdural fluid collection [G93.89] 02/13/2021    Elevated troponin [R77.8] 02/13/2021    Hypernatremia [E87.0] 02/13/2021    Colitis [K52.9] 02/13/2021    Essential hypertension [I10] 02/13/2021    Chronic anemia [D64.9] 02/13/2021    COPD (chronic obstructive pulmonary disease) ([facility] [J44.9] 06/27/2019    CKD (chronic kidney disease) stage 3, GFR 30-59 ml/min [N18.30] 11/29/2014    Hypokalemia [E87.6] 11/29/2014    Stage IV breast cancer in female [facility]) Linda Medrano 01/09/2014       Details of Conversation: Reviewed patient's chart, examined patient at the bedside. Patiently recently discharged from the hospital.  Has been fatigued, lying in bed since her discharge. According to patient's daughter Will Marcano, patient has just been getting weaker as time is going by. Her chemotherapy has been pushed out by a couple weeks at this point. She does not know if Alee Hanley is strong enough to get her next scheduled chemotherapy. Introduced myself and [facility]. Explained CODE STATUS levels in great detail. Will Marcano asked me to please change the patient to a DNR CCA at this time. Pamphlet given with DNR-CC CODE STATUS levels outlined, and Palliative Care Pamphlet with contact information provided. Daughter is familiar with hospice, and does not wish to revisit with them at this time. Emotional support and active listening provided. Will Marcano thanked us for taking the time to talk to her and explain everything. Goal- See how patient does with hydration and her medications.     -Please make sure patient has a copy of signed DNR-CCA for her records upon discharge.      OBJECTIVE:   Prognosis: unknown    Physical Exam:  /89   Pulse 87   Temp 96.8 °F (36 °C) (Temporal)   Resp 16   Ht 5' 3\" (1.6 m)   Wt 140 lb (63.5 kg)   SpO2 93%   BMI 24.80 kg/m²   Gen:  Elderly, thin, NAD, able to nod to questions  HEENT:  Normocephalic, atraumatic, mucosa moist, blind/cloudy  Neck:  Supple, trachea midline, no JVD  Lungs: Diminished bilaterally, no audible rhonchi or wheezes noted, respirations unlabored, 3L NC  Heart[de-identified]  RRR, distant heart tones, no murmur, rub, or gallop noted during exam  Abd:  Soft, non tender, non distended, bowel sounds present  : Catheter-amy/Tea  Ext:  Moving all extremities, no edema, pulses present  Skin:  Warm and dry, flakiness and scattered scabs to BLE  Neuro:  PERRL, responds to voice/nods to questions; follows simple commands, weak    Objective data reviewed: labs, images, records, medication use, vitals and chart    Discussed patient and the plan of care with the other IDT members: Palliative Medicine IDT Team, Floor Nurse, Patient and Family    Time/Communication  Greater than 50% of time spent, total 50 minutes in counseling and coordination of care at the bedside regarding CODE STATUS, family support and goals of care. Thank you for allowing Palliative Medicine to participate in the care of Silvano Zurita. Note: This report was completed using computer"[employer] voiced recognition software. Every effort has been made to ensure accuracy; however, inadvertent computerized transcription errors may be present.

## 2021-02-14 NOTE — PROGRESS NOTES
Occupational Therapy  OCCUPATIONAL THERAPY INITIAL EVALUATION      Date:2021  Patient Name: Ariel Harrison  MRN: [id number]  : 1940  Room: [address] [place] [address]-A    Referring Provider: DARIEN Valles       Evaluating OT: Boni Marquez OTR/L; [id number]    AM-PAC Daily Activity Raw Score:     Recommended Adaptive Equipment: Hospital bed and wheelchair     Diagnosis: Acutre Encephalopathy   Pertinent Medical History:    Past Medical History:   Diagnosis Date    Acute deep vein thrombosis (DVT) of femoral vein of left lower extremity ([facility] 2021    Arthritis     Cancer (HCC)     breast    COPD (chronic obstructive pulmonary disease) ([facility] 2019    Diabetes mellitus ([facility]     GERD (gastroesophageal reflux disease)     Glaucoma     Hypertension     Neuropathy     Thrombocytopenia ([facility] 2021    Unspecified cerebral artery occlusion with cerebral infarction       Precautions:  Falls,legally blind     Home Living: Pt lives with daughter in a one story home with 2 steps to enter no handrails   Bathroom setup: tub/shower    Equipment owned: rollator ,ww and shower chair     Prior Level of Function: pt needs total  assist  with ADLs , assist  with IADLs; . Daughter present and reports pt has not ambulated for several weeks and is bed bound at home.  Pt does have PT and OT homecare       Pain Level: pt did nor report any pain   Cognition: A&O: 2/4; Follows 1 step directions with cues    Memory:  Poor    Sequencing:  Poor    Problem solving:  Fair    Judgement/safety:  Fair     Functional Assessment:   Initial Eval Status  Date: 21  Treatment Status  Date: STGs = LTGs  Time frame: 5-7 days   Feeding Maximal Assist   Minimal assist    Grooming Maximal Assist   Minimal Assist    UB Dressing Maximal Assist   Moderate Assist    LB Dressing Dependent      Bathing N/A       Toileting Dependent-catheter in place        Bed Mobility  Supine to sit: Maximal Assist   Sit to supine: instructed on placing of pillow under pt's heels in bed to help avoid pressure sores     Eval Complexity: Low     Assessment of current deficits  Functional mobility [x]  ADLs [x] Strength [x]  Cognition []  Functional transfers  [x] IADLs [] Safety Awareness [x]  Endurance [x]  Fine Motor Coordination [] Balance [x] Vision/perception [] Sensation []   Gross Motor Coordination [] ROM [] Delirium []                  Motor Control []       OT 1-3 wk PRN     Instruction/training on adapted ADL techniques and AE recommendations to increase functional independence within precautions  Training on energy conservation strategies/techniques to improve independence/tolerance for self-care routine  Functional transfer/mobility training/DME recommendations for increased independence, safety, and fall prevention  Patient/Family education to increase follow through with safety techniques and functional independence  Recommendation of environmental modifications for increased safety with functional transfers/mobility and ADLs  Positioning to improve functional independence    Rehab Potential: Good for established goals     Patient / Family Goal: Daughter states to have pt return home     Patient and/or family were instructed on functional diagnosis, prognosis/goals and OT plan of care. Demonstrated good  understanding.     Low  Evaluation (+) 10 tx mins     Treatment Time In: 0915         Treatment Time Out: 2486           Treatment Charges: Mins Units   Ther Ex  91608     Manual Therapy 45473     Thera Activities 18915 10 1   ADL/Home Mgt 29864     Neuro Re-ed 29815     Group Therapy      Orthotic manage/training  40421     Non-Billable Time     Total Timed Treatment 10 1       Evaluation time includes thorough review of current medical information, gathering information on past medical history/social history and prior level of function, completion of standardized testing/informal observation of tasks, assessment of data, and development of POC/Goals      Doug Umatilla OTR/L; [id number]

## 2021-02-14 NOTE — CONSULTS
[facility] [address] [place] [address] CARDIOLOGY    Name: Antonia Crain    Age: 79 y.o. Date of Admission: 2/13/2021  2:28 PM    Date of Service: 2/14/2021    Reason for Consultation: Troponin elevation    Referring Physician: Jose Toribio    History of Present Illness: The patient is a 79y.o. year old female who was just discharged from [facility] 2 weeks ago approximately and who has been very sedentary with severe fatigue. Laying in bed nearly all the time. Brought back in with mental status changes and felt to have encephalopathy. Troponin checked and was 0.15. ECG showed heart rate 102 bpm and telemetry has shown sinus rhythm. Unable to give cogent history due to mental status. Past Medical History:   Hypertension, hyperlipidemia, diabetes, stage III chronic kidney disease, COPD, metastatic breast cancer with bony metastasis and questionable lung metastasis. Pancytopenia and extensive left lower extremity DVT. Previous echocardiogram in 2013 showed preserved left ventricular systolic function, stage I diastolic dysfunction, no significant or severe valvular abnormalities. Review of Systems: Unable to obtain due to patient mental status.       Family History:  Family History   Problem Relation Age of Onset    High Blood Pressure Mother     Stroke Mother     Early Death Father 61        liver disease    Heart Disease Father     High Blood Pressure Father     Asthma Sister     Cancer Brother     High Blood Pressure Brother     Stroke Brother        Social History:  Social History     Socioeconomic History    Marital status:      Spouse name: Not on file    Number of children: Not on file    Years of education: Not on file    Highest education level: Not on file   Occupational History    Not on file   Social Needs    Financial resource strain: Not on file    Food insecurity     Worry: Not on file     Inability: Not on file   [facility] Transportation needs     Medical: Not on file     Non-medical: Not on file   Tobacco Use    Smoking status: Former Smoker     Packs/day: 0.50     Years: 25.00     Pack years: 12.50     Start date: 1971     Quit date: 1998     Years since quittin.1    Smokeless tobacco: Never Used   Substance and Sexual Activity    Alcohol use: No    Drug use: No    Sexual activity: Not on file   Lifestyle    Physical activity     Days per week: Not on file     Minutes per session: Not on file    Stress: Not on file   Relationships    Social connections     Talks on phone: Not on file     Gets together: Not on file     Attends Voodoo service: Not on file     Active member of club or organization: Not on file     Attends meetings of clubs or organizations: Not on file     Relationship status: Not on file    Intimate partner violence     Fear of current or ex partner: Not on file     Emotionally abused: Not on file     Physically abused: Not on file     Forced sexual activity: Not on file   Other Topics Concern    Not on file   Social History Narrative    Not on file       Allergies:   Allergies   Allergen Reactions    Alcohol     Eggs Or Egg-Derived Products     Rubbing Alcohol [Alc-Cynara Scolymus] Hives    Ethanol Rash     Patient gets blisters       Penicillins Rash    Sulfa Antibiotics Itching and Rash       Current Medications:  Current Facility-Administered Medications   Medication Dose Route Frequency Provider Last Rate Last Admin    sodium chloride flush 0.9 % injection 10 mL  10 mL Intravenous PRN Bertrand Vilchis, DO   10 mL at  1655    sodium chloride flush 0.9 % injection 10 mL  10 mL Intravenous 2 times per day April DEANNA Sharma CNP        sodium chloride flush 0.9 % injection 10 mL  10 mL Intravenous PRN April DEANNA Sharma CNP        polyethylene glycol (GLYCOLAX) packet 17 g  17 g Oral Daily PRN April DEANNA Sharma CNP        YAJAIRA SharmaN - CNP        dextrose 5 % solution   Intravenous Continuous April Edith APRN -  mL/hr at 02/14/21 0556 Rate Verify at 02/14/21 0556    budesonide (PULMICORT) nebulizer suspension 250 mcg  0.25 mg Nebulization BID April Edith, APRN - CNP   Stopped at 02/13/21 2200    And    Arformoterol Tartrate (BROVANA) nebulizer solution 15 mcg  15 mcg Nebulization BID April Edith APRN - CNP   Stopped at 02/13/21 2200      dextrose      dextrose 100 mL/hr at 02/14/21 0556       Physical Exam:  BP (!) 144/84   Pulse 90   Temp 97.1 °F (36.2 °C) (Temporal)   Resp 14   Ht 5' 3\" (1.6 m)   Wt 140 lb (63.5 kg)   SpO2 98%   BMI 24.80 kg/m²   Weight change: Wt Readings from Last 3 Encounters:   02/14/21 140 lb (63.5 kg)   01/24/21 149 lb (67.6 kg)   12/16/20 149 lb 9.6 oz (67.9 kg)         General: Poorly arousable  HEENT: Unremarkable  Neck: No JVD or bruits. Cardiac: Regular rate and rhythm, normal S1 and S2, no extra heart sounds, murmurs, heaves, thrills  Resp: Lungs clear without wheezing or crackles. No accessory muscle use or retraction  Abdomen: soft, nontender, nondistended, no gross organomegaly or mass  Skin: Warm and dry, no cyanosis. Musculoskeletal: normal tone and strength in the upper and lower extremities bilaterally  Neuro: Grossly unremarkable  Psych: Cooperative, and normal affect    Intake/Output:    Intake/Output Summary (Last 24 hours) at 2/14/2021 0618  Last data filed at 2/14/2021 0556  Gross per 24 hour   Intake 646.67 ml   Output 850 ml   Net -203.33 ml     I/O this shift:  In: 646.7 [I.V.:646.7]  Out: 850 [Urine:850]    Laboratory Tests:  Lab Results   Component Value Date    CREATININE 1.7 (H) 02/13/2021    BUN 31 (H) 02/13/2021     (H) 02/13/2021    K 2.9 (L) 02/13/2021     (H) 02/13/2021    CO2 25 02/13/2021     No results for input(s): CKTOTAL, CKMB in the last 72 hours.     Invalid input(s): Cari Amador  Lab Results hospitalist.    #6.  Lower extremity DVT-IVC filter placed January 27. She cannot be anticoagulated due to pancytopenia. INR is 1.8 which suggests some degree of auto anticoagulation. #7. No further inpatient cardiac recommendations pending at this time. Sign off.     Electronically signed by Malu Hernandez MD on 2/14/2021 at 6:18 AM

## 2021-02-14 NOTE — H&P
[address] [facility]  History and Physical      CHIEF COMPLAINT:   AMS      HISTORY OF PRESENT ILLNESS:      The patient is a 79 y.o. female patient of Dr. Amadou Nair history of DVT, breast CA, COPD, diabetes, hypertension, CVA who presents with AMS. Patient presented to the ED with altered mental status per family. Reports of worsening mental status for days. Patient had recent admission forl trina lesion , breast cancer with mets ,pancytopenia  hypoglycemia , failure to thrive , left leg dvt 1/24 through 1/29. Patient was admitted to general medical floor for evaluation of failure to thrive/hypoglycemia in patient undergoing chemotherapy for metastatic breast cancer now with probable  bony mets as well as mets to the lungs versus new lung primary ca. Hospital course was complicated by Extensive DVT left leg S/P IVC filter placed 1/27 as pt cannot be anticoagulated dt pancytopenia. Per previous discharge note -- \"consider hospice and DNR CC given metastatic CA and likely new lung primary - poor prognosis - not sure family is ready to accept this. \"  Pt is a poor historian. History is largely obtained from chart review and discussions with staff/family as available. Per daughter patient is still \"sharp. \"  Denies fevers chills or cough. States patient is not eating because of diverticulitis.     Past Medical History:    Past Medical History:   Diagnosis Date    Acute deep vein thrombosis (DVT) of femoral vein of left lower extremity ([address] 1/26/2021    Arthritis     Cancer (HCC)     breast    COPD (chronic obstructive pulmonary disease) ([address] 6/27/2019    Diabetes mellitus (HCC)     GERD (gastroesophageal reflux disease)     Glaucoma     Hypertension     Neuropathy     Thrombocytopenia ([address] 1/26/2021    Unspecified cerebral artery occlusion with cerebral infarction        Past Surgical History:    Past Surgical History:   Procedure Laterality Date    BREAST LUMPECTOMY      BREAST SURGERY      right partial mastectomy    CHOLECYSTECTOMY      COLONOSCOPY N/A 6/4/2013    DIVERTICULOSIS;POLYPS @15CM    ECHO COMPL W DOP COLOR FLOW  1/9/2013         EYE SURGERY      HYSTERECTOMY      VENA CAVA FILTER PLACEMENT N/A 1/27/2021    VENA CAVA FILTER INSERTION performed by Dylan Yu MD at [address] [place] [address]       Medications Prior to Admission:    Medications Prior to Admission: palbociclib (IBRANCE) 125 MG capsule, Take 125 mg by mouth daily  prednisoLONE acetate (PRED FORTE) 1 % ophthalmic suspension, Place 1 drop into both eyes daily  ketorolac (ACULAR) 0.5 % ophthalmic solution, Place 1 drop into both eyes daily  fluticasone-vilanterol (BREO ELLIPTA) 100-25 MCG/INH AEPB inhaler, Inhale 1 puff into the lungs daily  potassium chloride (MICRO-K) 10 MEQ extended release capsule, Take 20 mEq by mouth 2 times daily  albuterol (PROVENTIL) (2.5 MG/3ML) 0.083% nebulizer solution, Take 3 mLs by nebulization every 6 hours as needed for Wheezing  dexamethasone (DECADRON) 0.1 % ophthalmic solution, 1 drop  gabapentin (NEURONTIN) 400 MG capsule, nightly  ofloxacin (OCUFLOX) 0.3 % solution, Place 1 drop into both eyes   gabapentin (NEURONTIN) 300 MG capsule, Take 300 mg by mouth 2 times daily. albuterol (PROVENTIL HFA;VENTOLIN HFA) 108 (90 BASE) MCG/ACT inhaler, Inhale 2 puffs into the lungs every 6 hours as needed for Wheezing. Oyster Shell 500 MG TABS, Take 500 mg by mouth daily. verapamil (CALAN-SR) 180 MG CR tablet, Take 180 mg by mouth 2 times daily. Lansoprazole (PREVACID PO), Take 30 mg by mouth daily. Loratadine (CLARITIN) 10 MG CAPS, Take 1 capsule by mouth daily. clobetasol prop emollient base 0.05 % CREA,     Allergies:    Alcohol, Eggs or egg-derived products, Rubbing alcohol [alc-cynara scolymus], Ethanol, Penicillins, and Sulfa antibiotics    Social History:    reports that she quit smoking about 23 years ago. She started smoking about 49 years ago.  She has a 12.50 pack-year smoking history. She has never used smokeless tobacco. She reports that she does not drink alcohol or use drugs. Family History:   family history includes Asthma in her sister; Cancer in her brother; Early Death (age of onset: 61) in her father; Heart Disease in her father; High Blood Pressure in her brother, father, and mother; Stroke in her brother and mother. REVIEW OF SYSTEMS:  As above in the HPI, otherwise negative    PHYSICAL EXAM:    Vitals:  BP (!) 144/84   Pulse 90   Temp 97.1 °F (36.2 °C) (Temporal)   Resp 14   Ht 5' 3\" (1.6 m)   Wt 140 lb (63.5 kg)   SpO2 98%   BMI 24.80 kg/m²     General: Somnolent oriented X 1. Well developed, cachectic no apparent distress. HEENT:  Normocephalic, atraumatic. Pupils equal, round, reactive to light. No scleral icterus. No conjunctival injection. Normal lips, teeth, and gums. No nasal discharge. Neck:  Supple  Heart:  RRR, no murmurs, gallops, rubs  Lungs:  CTA bilaterally, bilat symmetrical expansion, no wheeze, rales, or rhonchi  Abdomen:   Bowel sounds present, soft, nontender, no masses, no organomegaly, no peritoneal signs  Extremities:  No clubbing, cyanosis, or edema  Skin:  Warm and dry, no open lesions or rash  Neuro:  Cranial nerves 2-12 intact, no focal deficits  Breast: deferred  Rectal: deferred  Genitalia:  deferred    LABS:    CBC with Differential:    Lab Results   Component Value Date    WBC 4.0 02/13/2021    RBC 2.58 02/13/2021    HGB 8.5 02/13/2021    HCT 26.8 02/13/2021     02/13/2021    .9 02/13/2021    MCH 32.9 02/13/2021    MCHC 31.7 02/13/2021    RDW 21.1 02/13/2021    NRBC 6.1 02/13/2021    SEGSPCT 69 01/07/2014    BANDSPCT 1 12/02/2014    BLASTSPCT 0.9 10/10/2020    METASPCT 1.7 02/13/2021    LYMPHOPCT 8.7 02/13/2021    MONOPCT 6.1 02/13/2021    MYELOPCT 1.8 01/29/2021    EOSPCT 7 10/13/2010    BASOPCT 0.2 02/13/2021    MONOSABS 0.24 02/13/2021    LYMPHSABS 0.36 02/13/2021    EOSABS 0.00 02/13/2021    BASOSABS 0.00 02/13/2021     CMP:    Lab Results   Component Value Date     02/13/2021    K 2.9 02/13/2021     02/13/2021    CO2 25 02/13/2021    BUN 31 02/13/2021    CREATININE 1.7 02/13/2021    GFRAA 35 02/13/2021    LABGLOM 35 02/13/2021    GLUCOSE 159 02/13/2021    GLUCOSE 150 02/29/2012    PROT 5.8 02/13/2021    LABALBU 2.2 02/13/2021    LABALBU 4.0 02/29/2012    CALCIUM 7.9 02/13/2021    BILITOT 1.4 02/13/2021    ALKPHOS 221 02/13/2021    AST 42 02/13/2021    ALT 23 02/13/2021     Magnesium:    Lab Results   Component Value Date    MG 2.3 02/13/2021     Phosphorus:    Lab Results   Component Value Date    PHOS 3.4 01/24/2021     PT/INR:    Lab Results   Component Value Date    PROTIME 20.7 02/13/2021    PROTIME 12.5 11/23/2010    INR 1.8 02/13/2021     Last 3 Troponin:    Lab Results   Component Value Date    TROPONINI 0.15 02/13/2021    TROPONINI <0.01 01/26/2021    TROPONINI <0.01 01/24/2021     U/A:    Lab Results   Component Value Date    COLORU ERA 02/13/2021    PROTEINU TRACE 02/13/2021    PHUR 5.0 02/13/2021    WBCUA 0-1 02/13/2021    RBCUA NONE 02/13/2021    RBCUA NONE 10/16/2012    BACTERIA RARE 02/13/2021    CLARITYU Clear 02/13/2021    SPECGRAV 1.020 02/13/2021    LEUKOCYTESUR Negative 02/13/2021    UROBILINOGEN 2.0 02/13/2021    BILIRUBINUR SMALL 02/13/2021    BLOODU Negative 02/13/2021    GLUCOSEU Negative 02/13/2021     ABG:    Lab Results   Component Value Date    PH 7.556 02/13/2021    PCO2 25.5 02/13/2021    PO2 133.1 02/13/2021    HCO3 22.1 02/13/2021    BE 0.4 02/13/2021    O2SAT 98.4 02/13/2021     HgBA1c:    Lab Results   Component Value Date    LABA1C 5.1 01/24/2021     FLP:    Lab Results   Component Value Date    TRIG 145 06/08/2018    HDL 34 06/08/2018    LDLCALC 42 06/08/2018    LABVLDL 29 06/08/2018     TSH:    Lab Results   Component Value Date    TSH 3.190 02/13/2021       CT HEAD WO CONTRAST   Final Result   Right frontoparietal subdural collection (measures 10 mm in width) with Elevated troponin    Hypernatremia    Colitis    Essential hypertension    Chronic anemia    Lactic acidosis  Resolved Problems:    * No resolved hospital problems.  *      PLAN:    80-year-old female history of stage IV metastatic breast CA, CKD 3, COPD, diabetes admitted with encephalopathy and hypernatremia    Admit to telemetry  IV fluids  IV antibiotics  Urine culture  Blood cultures  Calcitonin  Monitor labs closely  Medications for other co morbidities cont as appropriate w dosage adjustments as necessary     PT/OT  DVT PPx  DC planning     Palliative care consult    Electronically signed by Sharon Cornejo MD on 2/14/2021 at 7:40 AM

## 2021-02-15 PROBLEM — R13.10 DYSPHAGIA: Status: ACTIVE | Noted: 2021-01-01

## 2021-02-15 NOTE — PROGRESS NOTES
Obtunded. Responds to commands. Conversing with family well. Subdural causing slight mass effect. Clinically stable.   Will treat conservatively

## 2021-02-15 NOTE — PLAN OF CARE
Problem: Skin Integrity:  Goal: Will show no infection signs and symptoms  Description: Will show no infection signs and symptoms  Outcome: Met This Shift  Goal: Absence of new skin breakdown  Description: Absence of new skin breakdown  Outcome: Met This Shift     Problem: Falls - Risk of:  Goal: Will remain free from falls  Description: Will remain free from falls  2/15/2021 0324 by Shea Juan RN  Outcome: Met This Shift     Problem: Falls - Risk of:  Goal: Absence of physical injury  Description: Absence of physical injury  2/15/2021 0324 by Shea Juan RN  Outcome: Met This Shift     Problem: Infection:  Goal: Will remain free from infection  Description: Will remain free from infection  Outcome: Met This Shift     Problem: Safety:  Goal: Free from accidental physical injury  Description: Free from accidental physical injury  Outcome: Met This Shift     Problem: Safety:  Goal: Free from intentional harm  Description: Free from intentional harm  Outcome: Met This Shift     Problem: Daily Care:  Goal: Daily care needs are met  Description: Daily care needs are met  Outcome: Met This Shift     Problem: Pain:  Goal: Patient's pain/discomfort is manageable  Description: Patient's pain/discomfort is manageable  Outcome: Met This Shift     Problem: Skin Integrity:  Goal: Skin integrity will stabilize  Description: Skin integrity will stabilize  Outcome: Met This Shift     Problem: Discharge Planning:  Goal: Patients continuum of care needs are met  Description: Patients continuum of care needs are met  Outcome: Met This Shift     Problem: Neurological  Goal: Maximum potential motor/sensory/cognitive function  Outcome: Met This Shift

## 2021-02-15 NOTE — PROGRESS NOTES
Physical Therapy    Physical Therapy Initial Assessment     Name: Willow Rachel  : 1940  MRN: [id number]    Referring Provider:  DEANNA Valles CNP    Date of Service: 2/15/2021    Evaluating PT:  Mauricio Ware PT, DPT    Room #:  5277/7535-L  Diagnosis:  Encephalopathy acute  PMHx/PSHx:  Neuropathy, Cancer, DM, HTN, Arthritis, GERD, Cerebral artery occlusion/infarction, Glaucoma, COPD, Acute DVT LLE 2021, Thrombocytopenia 2021, Vena cava filter insertion 2021  Procedure/Surgery:  NA  Precautions:  Falls, O2, Legally blind  Equipment Needs:  Has Rollator [employer]    SUBJECTIVE:    Per chart, pt lives with her daughter in a 1 story home with 2 stairs to enter and no rail(s). Bed is on ? floor and bath is on ? floor. Per chart, pt has not ambulated since previous hospitalization. Pt is dependent for all care at home. Pt lethargic and not providing history or home set up. OBJECTIVE:   Initial Evaluation  Date: 2/15/2021 Treatment  NA Short Term/ Long Term   Goals   AM-PAC 6 Clicks      Was pt agreeable to Eval/treatment? Partially      Does pt have pain? Cried out in pain with mobility of LLE. Bed Mobility  Rolling: Dependent  Supine to sit: Dependent  Sit to supine: Dependent  Scooting: Dependent  Max A   Transfers Sit to stand: NT  Stand to sit: NT  Stand pivot: NT  Max A   Ambulation    NT  >5 feet with WW with Max A   Stair negotiation: ascended and descended  NT  TBA when able to ambulate safely. ROM BUE:  Per OT  BLE:  Limited by pain     Strength BUE:  Per OT  BLE:  Able to wiggle toes, would not follow MMT commands  Increase 1/3 grade MMT   Balance Sitting EOB:  NT  Dynamic Standing:  NT  Sitting EOB:  Max A  Dynamic Standing: Max A     Pt is A & O x 1, self only. Patient reported hearing questions, but latent with response.   Sensation:  Pt denied numbness and tingling  Edema:  None noted    Therapeutic Exercises:  PROM to BLEs within available pain free range    Patient education  Pt educated on purpose of PT assessment, importance of mobility, safety with mobility    Patient response to education:   Pt verbalized understanding Pt demonstrated skill Pt requires further education in this area   No  No with verbal cues and assist Yes      ASSESSMENT:    Comments:  Patient cleared by RN and agreeable to treatment. Patient found in semi Schwab's on left turn via TAPS. Patient lethargic and would not open her eyes, but did answer some questions with delayed response. Patient resistive to movement of BLEs and cried out in pain with LLE mobility but would not describe location of pain. Patient resistive to rolling and did not participate with task and did not attempt to assist with supine to sit task. Patient assisted back to supine and returned to left turn via TAPS as found with call light and tray table in reach. Treatment:  Patient practiced and was instructed in the following treatment:     Bed mobility: Verbal/tactile cues for sequencing BUEs/BLEs for safe technique with rolling/supine<>sit task. No participation with task.  Transfer training: NT   Gait training: NT   Therapeutic exercises: As noted above   Neuromuscular education: NT    Pt's/ family goals   1. Patient did not participate in goal setting. Patient and or family understand(s) diagnosis, prognosis, and plan of care. Questionable      PLAN OF CARE:    Current Treatment Recommendations     [x] Strengthening     [x] ROM   [x] Balance Training   [x] Endurance Training   [x] Transfer Training   [x] Gait Training   [] Stair Training   [x] Positioning   [x] Safety and Education Training   [x] Patient/Caregiver Education   [] HEP  [] Other       PT care will be provided in accordance with the objectives noted above and progressed and updated when appropriate. Exercises and functional mobility practice will be used as well as appropriate assistive devices or modalities to obtain goals.  Patient and

## 2021-02-15 NOTE — PROGRESS NOTES
Subjective:  Feeling better   No CP or SOB  No fever or chills   No uncontrolled pain  No vomiting or diarrhea     Objective:    /72   Pulse 88   Temp 97.5 °F (36.4 °C) (Temporal)   Resp 24   Ht 5' 3\" (1.6 m)   Wt 150 lb (68 kg)   SpO2 94%   BMI 26.57 kg/m²     24HR INTAKE/OUTPUT:      Intake/Output Summary (Last 24 hours) at 2/15/2021 0616  Last data filed at 2/15/2021 0237  Gross per 24 hour   Intake 1869.3 ml   Output 100 ml   Net 1769.3 ml       General appearance: NAD, conversant  Neck: FROM, supple   Lungs: Clear bilaterally no wheezes, no rhonchi, no crackles  CV: RRR, no MRGs; normal carotid upstroke and amplitude without Bruits  Abdomen: Soft, non-tender; no masses or HSM  Extremities: No edema, no cyanosis, no clubbing  Skin: Intact no rash, no lesions, no ulcers    Psych: Alert and oriented normal affect  Neuro: Nonfocal  Most Recent Labs  Lab Results   Component Value Date    WBC 3.7 (L) 02/15/2021    HGB 7.4 (L) 02/15/2021    HCT 23.9 (L) 02/15/2021     02/15/2021     (H) 02/15/2021    K 3.1 (L) 02/15/2021     (H) 02/15/2021    CREATININE 1.4 (H) 02/15/2021    BUN 21 02/15/2021    CO2 22 02/15/2021    GLUCOSE 228 (H) 02/15/2021    ALT 19 02/14/2021    AST 35 (H) 02/14/2021    INR 1.8 02/13/2021    TSH 3.190 02/13/2021    LABA1C 5.1 01/24/2021     Recent Labs     02/14/21  1156   MG 2.1     Lab Results   Component Value Date    CALCIUM 6.6 (L) 02/15/2021    PHOS 3.4 01/24/2021        CT HEAD WO CONTRAST   Final Result   Right frontoparietal subdural collection (measures 10 mm in width) with   effacement of adjacent sulci and minimal midline deviation to the left. The   collection appears subacute to possibly chronic, although new since prior   study. Critical results were called by Dr. Everlyn Eisenmenger to [facility] on 2/13/2021 at   17:23. CTA CHEST W CONTRAST   Final Result   1. No pulmonary embolism.    2.  Redemonstration of spiculated nodule in left upper lobe measuring up to   14 mm. Finding is concerning for neoplasm. 3.  Bone lesion present within body of T10 concerning for osseous metastases. 4.  Stable sclerotic appearance of the sternum concerning for osseous   metastases. 5.  Fracture involving posterolateral right 7th rib with nonhealing and   adjacent prominent soft tissue. Findings could suggest pathologic fracture. 6.  Emphysematous changes. CT ABDOMEN PELVIS W IV CONTRAST Additional Contrast? None   Final Result   1. Moderate free fluid in pelvis as well as minimal perihepatic and   perisplenic ascites. 2.  Focal thickened appearance of wall of the cecum at level of the ileocecal   valve could suggest nonspecific colitis, nondistention, or neoplasm. Short-term follow-up recommended. 3.  Small bowel diverticulosis. 4.  Minimal free fluid and fat stranding adjacent to the duodenum. This   finding may be secondary to small abdominal ascites versus duodenitis. 5.  Central hypoattenuation located within distal left external iliac vein   and left common femoral vein. Ultrasound may be helpful to exclude   possibility of deep vein thrombosis. XR CHEST PORTABLE   Final Result   No acute cardiopulmonary process. Assessment    Principal Problem:    Acute metabolic encephalopathy  Active Problems:    Stage IV breast cancer in female [facility])    Hypokalemia    CKD (chronic kidney disease) stage 3, GFR 30-59 ml/min    COPD (chronic obstructive pulmonary disease) (HCC)    Type 2 diabetes mellitus (HCC)    Neuropathy    Subdural fluid collection    Elevated troponin    Hypernatremia    Colitis    Essential hypertension    Chronic anemia    Lactic acidosis    Dysphagia  Resolved Problems:    * No resolved hospital problems.  *      Plan:     81-year-old female history of stage IV metastatic breast CA, CKD 3, COPD, diabetes admitted with encephalopathy and hypernatremia     Admit to telemetry  IV fluids D5W with 20 of K fluids adjusted  IV KCl additional replacement ordered  IV antibiotics   speech eval completed video swallow ordered  N.p.o.  Urine culture  Blood cultures  proCalcitonin elevated 0.49  Monitor labs closely  Medications for other co morbidities cont as appropriate w dosage adjustments as necessary   Minimally elevated troponin-suspect secondary to renal dysfunction in the context of dehydration possible infection-no symptoms of ACS-no plans for further cardiac testing  PT/OT  DVT PPx  DC planning      Palliative care Consult appreciated   DNR CCA    Electronically signed by Nacho Babin MD on 2/15/2021 at 6:16 AM

## 2021-02-15 NOTE — PROGRESS NOTES
situation/question specific   During encounter Will Marcano was surrogate medical decision-maker   Outcome of goals of care meeting:   Code status DNR-CCA   Advanced Directives:  living will in [facility]- 1/7/2009   Surrogate/Legal NOK:  Artis Odell [phone] Agent in [address]  o Trena Halsted [phone]  o Michelle Ortiz [phone]  o Dhruv Hayden [phone]    Spiritual assessment: no spiritual distress identified  Bereavement and grief: to be determined  Referrals to: none today  SUBJECTIVE:   Details of Conversation: Patient seen at the bedside with her daughter present. She is resting comfortably, is ill appearing and frail, but in no acute distress and expresses no complains. The daughter wishes to continue with her current treatment plan. As discussed before the daughter is familiar with hospice, and does not wish to revisit with them at this time. She denies any immediate palliative needs at this time.    -Please make sure patient has a copy of signed DNR-CCA for her records upon discharge.      OBJECTIVE:   Prognosis: unknown    Physical Exam:  /78   Pulse 92   Temp 97.1 °F (36.2 °C) (Temporal)   Resp 18   Ht 5' 3\" (1.6 m)   Wt 150 lb (68 kg)   SpO2 96%   BMI 26.57 kg/m²   Gen:  Elderly, thin, NAD, chronically ill appearing  HEENT:  Normocephalic, atraumatic, mucosa moist, blind/cloudy  Neck:  Supple, trachea midline, no JVD  Lungs: Diminished bilaterally, no audible rhonchi or wheezes noted, respirations unlabored, 3L NC  Heart[de-identified]  RRR, distant heart tones, no murmur, rub, or gallop noted during exam  Abd:  Soft, non tender, non distended, bowel sounds present  : Catheter-amy/Tea  Ext:  Moving all extremities, no edema, pulses present  Skin:  Warm and dry, flakiness and scattered scabs to BLE  Neuro:  PERRL, responds to voice/nods to questions; follows simple commands, weak    Objective data reviewed: labs, images, records, medication use, vitals and chart    Discussed patient and the plan of care with the other IDT members: Palliative Medicine IDT Team, Floor Nurse, Patient and Family    Priti Tyler. [address] [place] [address]  Palliative Medicine    Time/Communication  Greater than 50% of time spent, total 15 minutes in counseling and coordination of care at the bedside regarding CODE STATUS, family support and goals of care. Thank you for allowing Palliative Medicine to participate in the care of Adrienne Mendez. Note: This report was completed using computerize voiced recognition software. Every effort has been made to ensure accuracy; however, inadvertent computerized transcription errors may be present.

## 2021-02-15 NOTE — PROGRESS NOTES
SPEECH/LANGUAGE PATHOLOGY  CLINICAL ASSESSMENT OF SWALLOWING FUNCTION    PATIENT NAME:  Kain Michael      :  1940      TODAY'S DATE:  2/15/2021  ROOM:  8503/8503-A    SUMMARY OF EVALUATION     DYSPHAGIA DIAGNOSIS:  Severe oral dysphagia, moderate pharyngeal dysphagia      DIET RECOMMENDATIONS:  NPO (nothing by mouth including oral meds)       FEEDING RECOMMENDATIONS:     Assistance level:  Not applicable      Compensatory strategies recommended: Not applicable    THERAPY RECOMMENDATIONS:      Dysphagia therapy is recommended 3-5 times per week for LOS or when goals are met. 1. Pt will complete oral motor strength/ coordination exercises to improve bolus prep/ control and mastication with  moderate verbal prompts . 2. Pt will complete BOTR strength/ ROM exercises to reduce pharyngeal residuals and improve epiglottic inversion with  moderate verbal prompts. 3. Pt will complete laryngeal strength/ ROM therapeutic exercises to improve airway protection for the least restrictive PO diet with  moderate verbal prompts    4.  A Video Swallow Study (MBSS) is recommended and requires a physician order                 PROCEDURE     Consistencies Administered During the Evaluation   Liquids: thin liquid   Solids:  pureed foods      Method of Intake:   spoon  Fed by clinician      Position:   Seated, reclined                   RESULTS     Oral Stage:        Inadequate labial seal resulting anterior labial spillage from midline , Decreased mastication due to:  poor/missing dentition  , decreased lingual control, cognitive function and disorganized chewing pattern, Delayed A-P transit due to: decreased ability for initiation   , reduced lingual strength  and cognitive function  and Decreased bolus formation resulting in premature pharyngeal spillage      Pharyngeal Stage:      Multiple swallows were noted after presentation of thin liquid and pureed foods and Delayed initiation of the pharyngeal swallow noted                  The Speech Language Pathologist (SLP) completed education with the patient regarding results of evaluation. Explained that Speech Pathology intervention is warranted  at this time   Prognosis for improvements is fair     This plan will be re-evaluated and revised in 1 week  if warranted. Patient stated goals: Did not state,   Treatment goals discussed with Patient and Family   The Family understand(s) the diagnosis, prognosis and plan of care       CPT code:  42728  bedside swallow eval      [x]The admitting diagnosis and active problem list, as listed below have been reviewed prior to initiation of this evaluation. ADMITTING DIAGNOSIS: Encephalopathy acute [G93.40]     ACTIVE PROBLEM LIST:   Patient Active Problem List   Diagnosis    Diastolic dysfunction    Stage IV breast [facility] in female ([facility] [address]    HTN (hypertension), benign    Diabetes mellitus type 2, uncontrolled ([facility] [address]    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    Hypokalemia    CKD (chronic kidney disease) stage 3, GFR 30-59 ml/min    Acute diverticulitis    Hypoxia    COPD (chronic obstructive pulmonary disease) (HCC)    Pancytopenia (HCC)    Lung mass    Pleural effusion    Acute hypoxemic respiratory failure (HCC)    Failure to thrive in adult    Acute deep vein thrombosis (DVT) of femoral vein of left lower extremity (HCC)    Thrombocytopenia (HCC)    Severe protein-calorie malnutrition (HCC)    Acute metabolic encephalopathy    Type 2 diabetes mellitus (HCC)    Neuropathy    Subdural fluid collection    Elevated troponin    Hypernatremia    Colitis    Essential hypertension    Chronic anemia    Lactic acidosis       Marilyn Whitaker M.S., CCC-SLP/L  Speech-Language Pathologist

## 2021-02-15 NOTE — PROGRESS NOTES
SPEECH/LANGUAGE PATHOLOGY  VIDEOFLUOROSCOPIC STUDY OF SWALLOWING (MBS)      PATIENT NAME:  Silvano Zurita      :  1940      TODAY'S DATE:  2/15/2021   ROOM:  8503/8503-A    SUMMARY OF EVALUATION     DYSPHAGIA DIAGNOSIS:  Moderate Oral, Mild to Moderate Pharyngeal dysphagia--- minimal assessment overall secondary to poor cooperation      DIET RECOMMENDATIONS:  Dysphagia 1, Pureed solids with  nectar consistency (mildly thick) liquids     FEEDING RECOMMENDATIONS:     Assistance level:  Full assistance is needed during all oral intake      Compensatory strategies recommended: Small bites/sips-- NTL via tsp and Alternate solids and liquids    THERAPY RECOMMENDATIONS:      Dysphagia therapy is recommended 3-5 times per week for LOS or when goals are met. Pt will complete oral motor strength/ coordination exercises to improve bolus prep/ control and mastication with  moderate verbal prompts .   Meal endurance analysis 1-2 sessions to provide diet modification and compensatory strategy implementation due to need to ensure proper implementation of compensatory strategies during PO intake    Repeat MBSS to further assess as Pt willing to cooperate                 PROCEDURE     Consistencies Administered During the Evaluation   Liquids: nectar thick liquid   Solids:  pureed foods      Method of Intake:   spoon  Fed by clinician      Position:   Seated, upright    Current Respiratory Status   nasal canula                RESULTS     ORAL STAGE       Inadequate labial seal resulting anterior labial spillage on the right, Delayed A-P transit due to: cognitive function , Oral residuals were noted :  on the base of the tongue and throughout the oral cavity and Decreased bolus formation resulting in premature pharyngeal spillage        PHARYNGEAL STAGE           ONSET TIME     Delayed initiation of the pharyngeal swallow was noted with swallow reflex triggered at the level of the vallecula       PHARYNGEAL RESIDUALS Vallecula/Pharyngeal Wall           No significant residuals were noted in the vallecula      Pyriform Sinuses      No significant residuals were noted in the pyriform sinuses    LARYNGEAL PENETRATION     Laryngeal penetration was not present during this evaluation                 ASPIRATION    Aspiration was not present during this evaluation         COMPENSATORY STRATEGIES       Compensatory strategies were not attempted      STRUCTURAL/FUNCTIONAL ANOMALIES       No structural/functional anomalies were noted      CERVICAL ESOPHAGEAL STAGE :        The cervical esophagus appeared adequate                            The Speech Language Pathologist (SLP) completed education with the patient regarding results of evaluation. Explained that Speech Pathology intervention is warranted  at this time   Prognosis for improvements is fair     This plan will be re-evaluated and revised in 1 week  if warranted. Patient stated goals: Did not state,   Treatment goals discussed with Patient   The Patient did not demonstrate understanding of the diagnosis, prognosis and plan of care       CPT code:  63864  dysphagia study        [x]The admitting diagnosis and active problem list, as listed below have been reviewed prior to initiation of this evaluation.      ADMITTING DIAGNOSIS: Encephalopathy acute [G93.40]     ACTIVE PROBLEM LIST:   Patient Active Problem List   Diagnosis    Diastolic dysfunction    [facility] [address]    HTN (hypertension), benign    Diabetes mellitus type 2, uncontrolled ([facility] [address]    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    Hypokalemia    CKD (chronic kidney disease) stage 3, GFR 30-59 ml/min    Acute diverticulitis    Hypoxia    COPD (chronic obstructive pulmonary disease) (HCC)    Pancytopenia (HCC)    Lung mass    Pleural effusion    Acute hypoxemic respiratory failure (HCC)    Failure to thrive in adult    Acute deep vein thrombosis (DVT) of femoral vein of left lower extremity (HCC)    Thrombocytopenia (HCC)    Severe protein-calorie malnutrition (HCC)    Acute metabolic encephalopathy    Type 2 diabetes mellitus (HCC)    Neuropathy    Subdural fluid collection    Elevated troponin    Hypernatremia    Colitis    Essential hypertension    Chronic anemia    Lactic acidosis    Dysphagia

## 2021-02-15 NOTE — CARE COORDINATION
Spoke with the patient's daughter Joel Morel re: transition of care plans. Explained role of CM and Dav Marquez expressed understanding. Patient lives in a ranch style home with two step entry with her daughter Genesis Jean. Family had to carry the patient into the house last time she was discharged as she was not able to stand up to get out of the car. Patient is non-ambulatory and has been a total care at home prior to admission. Patient currently on 3L O2 per NC but does not wear home oxygen. Patient is active with [facility] for nursing, PT, and OT. Patient also has [facility] through her [facility] twice a week on Tuesday and Thursday for 2 hours each day. Per Joel Morel, the family plans to transition the patient back home when she is medically stable to discharge. Patient may need transportation arranged at discharge. Per Neurology the patient is Obtunded. SDH causing slight mass effect, but clinically stable. Code Status changed to [facility] per Palliative care notes from 2/14. Family also wishes to proceed with 2/23 scheduled PET scan for more information re: Lung Mass. Will continue to follow for further transition of care planning needs.      Halina Witt.  P:  [phone]

## 2021-02-16 NOTE — PROGRESS NOTES
situation/question specific   During encounter Theresa Arnett was surrogate medical decision-maker   Outcome of goals of care meeting:   Code status DNR-CCA   Advanced Directives:  living will in [facility]- 1/7/2009   Surrogate/Legal NOK:  Kevin Geronimo [phone] Agent in [address] [phone]  o Nathalia Tipton [phone]  o Narda Hooker [phone]    Spiritual assessment: no spiritual distress identified  Bereavement and grief: to be determined  Referrals to: none today  SUBJECTIVE:   Details of Conversation: Patient seen at the bedside, no family present. Denies pain or dyspnea. No needs at this time. Spoke with care coordinator, family planning to bring patient home. Patient candidate for home palliative care to follow her if family does not want hospice.     OBJECTIVE:   Prognosis: unknown    Physical Exam:  BP (!) 145/91   Pulse 81   Temp 97.9 °F (36.6 °C) (Temporal)   Resp 20   Ht 5' 3\" (1.6 m)   Wt 145 lb 4.8 oz (65.9 kg)   SpO2 99%   BMI 25.74 kg/m²   Gen:  Elderly, thin, NAD, chronically ill appearing  HEENT:  Normocephalic, atraumatic, mucosa moist  Neck:  Supple, trachea midline, no JVD  Lungs: Diminished bilaterally, no rales, rhonchi or wheezes, respirations unlabored, 3L NC  Heart[de-identified]  RRR, distant heart tones, no murmur, rub, or gallop noted during exam  Abd:  Soft, non tender, non distended, bowel sounds present  : deferred  Ext:  Moving all extremities, no edema, pulses present  Skin:  Warm and dry, flakiness and scattered scabs to BLE  Neuro:  PERRL, responds to voice/nods to questions; follows simple commands, weak    Objective data reviewed: labs, images, records, medication use, vitals and chart    Discussed patient and the plan of care with the other IDT members: Palliative Medicine IDT Team, Floor Nurse, Patient and [address] [place] [address] DO  Palliative Medicine    Time/Communication  Greater than 50% of time spent, total 15 minutes in counseling and coordination of care at the bedside regarding CODE STATUS, family support and goals of care. Thank you for allowing Palliative Medicine to participate in the care of Elliott Gamino.

## 2021-02-16 NOTE — PROGRESS NOTES
Subjective:  Feeling better   No CP or SOB  No fever or chills   No uncontrolled pain  No vomiting or diarrhea     Objective:    /80   Pulse 97   Temp 97.5 °F (36.4 °C) (Temporal)   Resp 18   Ht 5' 3\" (1.6 m)   Wt 145 lb 4.8 oz (65.9 kg)   SpO2 99%   BMI 25.74 kg/m²     24HR INTAKE/OUTPUT:      Intake/Output Summary (Last 24 hours) at 2/16/2021 0801  Last data filed at 2/16/2021 0535  Gross per 24 hour   Intake 1256 ml   Output 650 ml   Net 606 ml       General appearance: NAD, conversant  Neck: FROM, supple   Lungs: Clear bilaterally no wheezes, no rhonchi, no crackles  CV: RRR, no MRGs; normal carotid upstroke and amplitude without Bruits  Abdomen: Soft, non-tender; no masses or HSM  Extremities: No edema, no cyanosis, no clubbing  Skin: Intact no rash, no lesions, no ulcers    Psych: Alert and oriented normal affect  Neuro: Nonfocal  Most Recent Labs  Lab Results   Component Value Date    WBC 3.7 (L) 02/15/2021    HGB 7.4 (L) 02/15/2021    HCT 23.9 (L) 02/15/2021     02/15/2021     (H) 02/15/2021    K 3.3 (L) 02/15/2021     (H) 02/15/2021    CREATININE 1.3 (H) 02/15/2021    BUN 19 02/15/2021    CO2 22 02/15/2021    GLUCOSE 169 (H) 02/15/2021    ALT 19 02/14/2021    AST 35 (H) 02/14/2021    INR 1.8 02/13/2021    TSH 3.190 02/13/2021    LABA1C 5.1 01/24/2021     Recent Labs     02/14/21  1156   MG 2.1     Lab Results   Component Value Date    CALCIUM 6.4 (L) 02/15/2021    PHOS 3.4 01/24/2021        FL MODIFIED BARIUM SWALLOW W VIDEO   Final Result   No evidence of aspiration or laryngeal penetration. The study is compromised   due to poor patient cooperation. Please see separate speech pathology report for full discussion of findings   and recommendations. CT HEAD WO CONTRAST   Final Result   Right frontoparietal subdural collection (measures 10 mm in width) with   effacement of adjacent sulci and minimal midline deviation to the left.   The   collection appears subacute to Dysphagia  Resolved Problems:    * No resolved hospital problems.  *      Plan:     30-year-old female history of stage IV metastatic breast CA, CKD 3, COPD, diabetes admitted with encephalopathy and hypernatremia     Admit to telemetry  IV fluids D5W with 20 of K fluids adjusted  IV KCl additional replacement ordered  IV antibiotics   speech eval completed video swallow completed  Recommended dysphagia diet ordered  Urine culture negative  Blood cultures NGTD  Covid 19 negative   proCalcitonin elevated 0.49, repeat  Monitor labs closely  Medications for other co morbidities cont as appropriate w dosage adjustments as necessary   Minimally elevated troponin-suspect secondary to renal dysfunction in the context of dehydration possible infection-no symptoms of ACS-no plans for further cardiac testing  PT/OT  DVT PPx  DC planning      Palliative care Consult appreciated   DNR CCA    Electronically signed by Jose Flores MD on 2/16/2021 at 8:01 AM

## 2021-02-16 NOTE — PROGRESS NOTES
Obtunded. Responds to commands. Subdural causing slight mass effect. Clinically stable. Will treat conservatively. Nothing new to add from neurosurgical stand point at this time.

## 2021-02-16 NOTE — PROGRESS NOTES
Physical Therapy    Physical Therapy Treatment Note     Name: Faustino Vasquez  : 1940  MRN: [id number]    Referring Provider:  DEANNA Valles CNP    Date of Service: 2021    Evaluating PT:  Marly Rice PT, DPT    Room #:  2874/8503-Z  Diagnosis:  Encephalopathy acute  PMHx/PSHx:  Neuropathy, Cancer, DM, HTN, Arthritis, GERD, Cerebral artery occlusion/infarction, Glaucoma, COPD, Acute DVT LLE 2021, Thrombocytopenia 2021, Vena cava filter insertion 2021  Procedure/Surgery:  NA  Precautions:  Falls, O2, Legally blind  Equipment Needs:  Has Rollator [employer]    SUBJECTIVE:    Per chart, pt lives with her daughter in a 1 story home with 2 stairs to enter and no rail(s). Bed is on ? floor and bath is on ? floor. Per chart, pt has not ambulated since previous hospitalization. Pt is dependent for all care at home. Pt lethargic and not providing history or home set up. OBJECTIVE:   Initial Evaluation  Date: 2/15/2021 Treatment Date: 2021 Short Term/ Long Term   Goals   AM-PAC 6 Clicks     Was pt agreeable to Eval/treatment? Partially  Partially     Does pt have pain? Cried out in pain with mobility of LLE. No c/o pain. Bed Mobility  Rolling: Dependent  Supine to sit: Dependent  Sit to supine: Dependent  Scooting: Dependent Rolling: Dependent  Supine to sit: Dependent to long sit  Sit to supine: Dependent  Scooting: Dependent Max A   Transfers Sit to stand: NT  Stand to sit: NT  Stand pivot: NT NT Max A   Ambulation    NT NT >5 feet with [employer] with Max A   Stair negotiation: ascended and descended  NT NA TBA when able to ambulate safely. ROM BUE:  Per OT  BLE:  Limited by pain     Strength BUE:  Per OT  BLE:  Able to wiggle toes, would not follow MMT commands  Increase 1/3 grade MMT   Balance Sitting EOB:  NT  Dynamic Standing:  NT Dependent to long sit Sitting EOB:  Max A  Dynamic Standing: Max A     Pt is A & O x 1, self only.  Increased conversation this minutes     CPT codes:  [] Gait training 81514 - minutes  [] Manual therapy 41160 - minutes  [x] Therapeutic activities 70298 24 minutes  [] Therapeutic exercises 29427 - minutes  [] Neuromuscular reeducation 64068 - minutes     Chen Ackerman PT, DPT  License [id number]

## 2021-02-17 PROBLEM — A41.9 SEPSIS (HCC): Status: ACTIVE | Noted: 2021-01-01

## 2021-02-17 NOTE — PROGRESS NOTES
Conf[place] [facility] [facility] for transport at 1700.

## 2021-02-17 NOTE — PROGRESS NOTES
situation/question specific   During encounter Mago Vasquez was surrogate medical decision-maker   Outcome of goals of care meeting:   Code status Limited no to everything   Advanced Directives:  living will in [facility]- 1/7/2009   Surrogate/Legal NOK:  Margaret Morris [phone] Agent in [address] [place]  [place] [phone]  o Vee Britt [phone]  o Carmen Earl [phone]    Spiritual assessment: no spiritual distress identified  Bereavement and grief: to be determined  Referrals to: none today  SUBJECTIVE:   Details of Conversation: Patient had RRT for hypoxia this morning. Daughter/HCPOA Mago Vasquez requested code status change and hospice consult, her plan is home with hospice. Bed and oxygen to be delivered this afternoon, needs comfort scripts. Spoke with daughter Mago Vasquez who was at bedside. She was asking about patient's PET scan scheduled for later this month, discussed that if she elects for hospice benefits patient will no longer be able to receive life-extending or curative treatments. Daughter asks if patient might improve enough to come off hospice, discussed that noone knows for 100% sure what will happen, some people do improve symptomatically while on hospice and no longer qualify for hospice benefits, but that given patient's RRT this morning her status is guarded and she may not be one of the people who comes off hospice. Daughter still wants to take patient home and care for her at home with help of hospice. Daughter had questions about what morphine does to help patient, and states she noticed patient would get restless and then settle down and appear more comfortable after a dose of morphine. Emotional support provided to daughter. Swabbed patient's mouth with moist sponge, patient stated thank you and sucked on sponge.     OBJECTIVE:   Prognosis: unknown    Physical Exam:  BP (!) 140/97   Pulse 89   Temp 97.5 °F (36.4 °C) (Temporal)   Resp 21   Ht 5' 3\" (1.6 m)   Wt 145 lb 4.8 oz (65.9 kg)   SpO2 (!) 83% Comment: respiratory called/ RRT  BMI 25.74 kg/m²   Gen:  Elderly, thin, NAD, chronically ill appearing, eyes closed  HEENT:  Normocephalic, atraumatic, mucosa dry  Neck:  Supple, trachea midline, no JVD  Lungs: Diminished bilaterally, no rales, rhonchi or wheezes, respirations unlabored, 3L NC  Heart[de-identified]  RRR, distant heart tones, no murmur, rub, or gallop noted during exam  Abd:  Soft, non tender, non distended, bowel sounds present  : deferred  Ext:  Limited movement of extremities due to weakness, no edema, pulses present  Skin:  Warm and dry, flakiness and scattered scabs to BLE  Neuro: weak, did say thank you after mouth was swabbed    Objective data reviewed: labs, images, records, medication use, vitals and chart    Discussed patient and the plan of care with the other IDT members: Palliative Medicine IDT Team, Floor Nurse, Patient and [address] [place] [address] DO  Palliative Medicine    Time/Communication  Greater than 50% of time spent, total 25 minutes in counseling and coordination of care at the bedside regarding CODE STATUS, family support and goals of care. Thank you for allowing Palliative Medicine to participate in the care of Leola Branch.

## 2021-02-17 NOTE — PROGRESS NOTES
CLINICAL PHARMACY NOTE: MEDS TO [address] [facility] Patient?: No  Total # of Prescriptions Filled: 4   The following medications were delivered to the patient:  · Hyoscyamine 0.125 mg  · Morphine sulfate   · Lorazepam 0.5 mg  · Haloperidol 1 mg  Total # of Interventions Completed: 4  Time Spent (min): 30    Additional Documentation:  Delivered meds to JULISSA Chaudhry

## 2021-02-17 NOTE — PROGRESS NOTES
[facility] OF THE [place]          [facility] Information Visit Note              Patient Name: Adrienne Mendez   :  1940  MRN:  [id number]    Admit date:  2021    Admitted from: [facility] Admitting Physician:  Sophie Guerrero MD   PCP:  Ila Culver MD      Primary Insurance: Payor: [facility] /  /  /    Secondary Insurance:  unknown    Emergency Contact:      Contact/Relation:   /         Phone:       Contact/Relation:   /     Phone:     Advance Directive  Advance directives received No  Patient has NOT completed an advance directive   Discussed with: Family member  DPOA-HC Name-Relation:    Phone:       Terminal Diagnosis metastatic breast cancer as confirmed by Dr. 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, E16.0    Hypokalemia E87.6    CKD (chronic kidney disease) stage 3, GFR 30-59 ml/min N18.30    Acute diverticulitis K57.92    Hypoxia R09.02    COPD (chronic obstructive pulmonary disease) ([facility] J44.9    Pancytopenia ([address] D61.818    Lung mass R91.8    Pleural effusion J90    Acute hypoxemic respiratory failure ([address] J96.01    Failure to thrive in adult R62.7    Acute deep vein thrombosis (DVT) of femoral vein of left lower extremity (HCC) I82.412    Thrombocytopenia (HCC) D69.6    Severe protein-calorie malnutrition ([address] E43    Acute metabolic encephalopathy U94.96    Type 2 diabetes mellitus ([address] E11.9    Neuropathy G62.9    Subdural fluid collection G93.89    Elevated troponin R77.8    Hypernatremia E87.0    Colitis K52.9    Essential hypertension I10    Chronic anemia D64.9    Lactic acidosis E87.2    Dysphagia R13.10    Sepsis present on admission A41.9       Code Status Order: Limited     Past Medical History:        Diagnosis Date    Acute deep vein thrombosis (DVT) of femoral vein of left lower extremity ([facility] 1/26/2021    Arthritis     Cancer (HCC)     breast    COPD (chronic obstructive pulmonary disease) ([facility] 6/27/2019    Diabetes mellitus (HCC)     GERD (gastroesophageal reflux disease)     Glaucoma     Hypertension     Neuropathy     Thrombocytopenia ([facility] 1/26/2021    Unspecified cerebral artery occlusion with cerebral infarction      Past Surgical History:        Procedure Laterality Date    BREAST LUMPECTOMY      BREAST SURGERY      right partial mastectomy    CHOLECYSTECTOMY      COLONOSCOPY N/A 6/4/2013    DIVERTICULOSIS;POLYPS @15CM    ECHO COMPL W DOP COLOR FLOW  1/9/2013         EYE SURGERY      HYSTERECTOMY      VENA CAVA FILTER PLACEMENT N/A 1/27/2021    VENA CAVA FILTER INSERTION performed by Tara Fernandez MD at [facility] OR       Allergies:  Alcohol, Eggs or egg-derived products, Rubbing alcohol [alc-cynara scolymus], Ethanol, Penicillins, and Sulfa antibiotics    Family Goal: comfort    Meeting held with Venessa Styles    The hospice benefit and philosophy were explained including that hospice is end of life care in which, per Medicare, a patient has a terminal diagnosis that life expectancy would be 6 months or less. Hospice care is a service that is covered by most insurance plans. The following levels of hospice care were discussed including, routine level of hospice care at private home or facility, which patient/family is responsible for any room and board fees at the facility, and general in patient level of care (GIP) at the [facility] for short term symptom management. Per Medicare guidelines, a patient is considered appropriate for GIP if they have uncontrolled symptoms such as pain, agitation, labored breathing or nausea/vomiting.   Once symptoms become managed, the patient would need to be moved to a lower level of care such as home with hospice, ECF with hospice or the Transition program.  [facility] transition program also explained which is routine hospice care provided at the [facility] instead of an ECF or home. The transition program is private pay $300/day for room/board. Room/board for the transition program is not covered by Medicaid as would be in an ECF. Family informed that with the routine level of care at home or ECF,  the hospice team consists of the RN who visits 1-3 times a week, a  who visits within the first five days of the hospice election, the personal care team who visit 1-3 times a week, non-medical volunteers and Chaplains. Explained that at home in routine level of care, familles are responsible for the 24 hour care. Discussed that under hospice care patient would not receive chemotherapy, radiation, immune therapy, IV antibiotics, dialysis or blood transfusions. Explained that once in hospice care, all aggressive treatments would be stopped and allow nature to takes its course with focus on comfort care for the patient. Zachery Boudreaux would like to take patient home with hospice. I explained that patient will need to be off the bipap and can have regular oxygen up to 10 liters at home. Patient is trying to speak on the bipap and wants the bipap off. Explained that we would get some comfort medications on board first then work on removal of bipap. Zachery Boudreaux will need a hospital bed and oxygen- ordered to be delivered today 2-5 pm.  Zachery Boudreaux would also like for the hospice medical director to follow. I updated charge nurse, bedside RN and SW/CM. Will need comfort scripts for discharge as well. Discharge Plan:  Discharge Disposition; home  Facility Name: none    \[facility]\"" plan:  1. Patient to discharge home with hospice. 2. Please call [phone] with any questions. 3. Patient not currently under the care of hospice.     Electronically signed by Vivian Hwang RN on 2/17/2021 at 10:30 AM

## 2021-02-17 NOTE — SIGNIFICANT EVENT
[x] > 2 seconds [] < 2 seconds    Neurologic:   [] NIHSS      [] Pupillary Response:     Response to pain:   [x] Yes  [] No  Follow commands:  [x] Yes  [] No  Facial asymmetry:  [] Yes  [x] No  Motor strength:  [] Equal  [] Focal deficit:   Diagnostic Test:  Blood Sugar:  221 mg/dL  EKG:  Poor quality EKG limited interpretation    ABG:      Lab Results   Component Value Date    PH 7.453 02/17/2021    PCO2 24.6 02/17/2021    PO2 56.5 02/17/2021    HCO3 16.8 02/17/2021    O2SAT 88.9 02/17/2021     Medication(s) Given:  []  Narcan (Naloxone)   []  Romazicon (Flumazenil)    []  Breathing Treatment    []  Steroids (Prednisone, Solu-Medrol)  []  Adenosine  [] Cardiac Medicines:     Infusion(s):   [] Normal Saline   Rate:   cc/hr      [] Lactate Ringers      [] Other:     Imaging:   [x] CXR:   [x] Normal         [] Pneumothorax         [] Pulmonary Edema  [] Infiltrate          [] CT Head  [] Normal          [] ICB          [] [facility]          [] Other:     Laboratory Tests:     CBC:   Lab Results   Component Value Date    WBC 4.5 02/17/2021    RBC 2.50 02/17/2021    HGB 8.3 02/17/2021    HCT 26.1 02/17/2021    .4 02/17/2021    MCH 33.2 02/17/2021    MCHC 31.8 02/17/2021    RDW 21.1 02/17/2021     02/17/2021    MPV 11.5 02/17/2021     BMP:    Lab Results   Component Value Date     02/17/2021    K 3.34 02/17/2021    K 3.1 02/14/2021     02/17/2021    CO2 21 02/17/2021    BUN 17 02/17/2021    LABALBU 1.8 02/14/2021    LABALBU 4.0 02/29/2012    CREATININE 1.1 02/17/2021    CALCIUM 6.2 02/17/2021    GFRAA 58 02/17/2021    LABGLOM 58 02/17/2021    GLUCOSE 221 02/17/2021    GLUCOSE 150 02/29/2012     Troponin:    Lab Results   Component Value Date    TROPONINI 0.09 02/17/2021         Teams Assessment and Plan:  1. Acute hypoxic respiratory failure- ABGs with low pO2 on nonrebreather 88%. Patient placed on AVAPS. CXR showing no evidence of acute process. Patient NSR on monitor.  Difficulty with getting pulse ox. Spoke with daughter Martha Valentin, about how she would like to proceed with Qiana's care. She was currently at [facility] status, which she could technically be intubated. Will Marcano states Alee Hanley does not want to be intubated. She requested code status to be changed and for hospice to be consulted. 2. Metabolic encephalopathy  3. Subdural   4. Elevated troponin  5. Stage IV breast cancer  ? ?  ? Disposition:  [x] No transfer   [] Transfer to monitor floor  [] Transfer to: [] MICU [] NICU [] CCU [] SICU    Patients family updated: [x] Yes  [] No   Discussed with:  [] Critical Care Intensivist:         [] Primary Care Provider: Kayden Sotelo MD      [x] Other: Dr. Marisela Gregorio? [address], [place]    > 35 minutes of CCT spent with the patient, reviewing the chart including imaging studies, and discussing the case with other health care professionals.

## 2021-02-17 NOTE — PROGRESS NOTES
Pt seen for dysphagia x2 . Results and recommendations of swallow study reviewed with patient. Tolerating current diet without noted difficulty. Laryngeal elevation and tongue base retraction exercises completed fair. Pt was instructed to continue exercises independently throughout the day.   Will continue

## 2021-02-17 NOTE — PROGRESS NOTES
Subjective:  Feeling tired wants to go home  Wants off bipap  No CP or SOB  No fever or chills   No uncontrolled pain  No vomiting or diarrhea   RRT for dyspnea  Objective:    /75   Pulse 89   Temp 96.8 °F (36 °C) (Temporal)   Resp 16   Ht 5' 3\" (1.6 m)   Wt 145 lb 4.8 oz (65.9 kg)   SpO2 100%   BMI 25.74 kg/m²     24HR INTAKE/OUTPUT:      Intake/Output Summary (Last 24 hours) at 2/17/2021 0744  Last data filed at 2/16/2021 2030  Gross per 24 hour   Intake 120 ml   Output --   Net 120 ml       General appearance: NAD, resp distress on Bipap  Neck: FROM, supple   Lungs: Clear bilaterally no wheezes, no rhonchi, no crackles  CV: RRR, no MRGs; normal carotid upstroke and amplitude without Bruits  Abdomen: Soft, non-tender; no masses or HSM  Extremities: No edema, no cyanosis, no clubbing  Skin: Intact no rash, no lesions, no ulcers    Psych: Alert and oriented normal affect  Neuro: Nonfocal  Most Recent Labs  Lab Results   Component Value Date    WBC 3.7 (L) 02/15/2021    HGB 7.4 (L) 02/15/2021    HCT 23.9 (L) 02/15/2021     02/15/2021     (H) 02/16/2021    K 3.6 02/16/2021     (H) 02/16/2021    CREATININE 1.3 (H) 02/16/2021    BUN 18 02/16/2021    CO2 21 (L) 02/16/2021    GLUCOSE 149 (H) 02/16/2021    ALT 19 02/14/2021    AST 35 (H) 02/14/2021    INR 1.8 02/13/2021    TSH 3.190 02/13/2021    LABA1C 5.1 01/24/2021     Recent Labs     02/14/21  1156   MG 2.1     Lab Results   Component Value Date    CALCIUM 6.3 (L) 02/16/2021    PHOS 3.4 01/24/2021        FL MODIFIED BARIUM SWALLOW W VIDEO   Final Result   No evidence of aspiration or laryngeal penetration. The study is compromised   due to poor patient cooperation. Please see separate speech pathology report for full discussion of findings   and recommendations.       CT HEAD WO CONTRAST   Final Result   Right frontoparietal subdural collection (measures 10 mm in width) with   effacement of adjacent sulci and minimal midline deviation to the left. The   collection appears subacute to possibly chronic, although new since prior   study. Critical results were called by Dr. Pita Cordero to [facility] on 2/13/2021 at   17:23. CTA CHEST W CONTRAST   Final Result   1. No pulmonary embolism. 2.  Redemonstration of spiculated nodule in left upper lobe measuring up to   14 mm. Finding is concerning for neoplasm. 3.  Bone lesion present within body of T10 concerning for osseous metastases. 4.  Stable sclerotic appearance of the sternum concerning for osseous   metastases. 5.  Fracture involving posterolateral right 7th rib with nonhealing and   adjacent prominent soft tissue. Findings could suggest pathologic fracture. 6.  Emphysematous changes. CT ABDOMEN PELVIS W IV CONTRAST Additional Contrast? None   Final Result   1. Moderate free fluid in pelvis as well as minimal perihepatic and   perisplenic ascites. 2.  Focal thickened appearance of wall of the cecum at level of the ileocecal   valve could suggest nonspecific colitis, nondistention, or neoplasm. Short-term follow-up recommended. 3.  Small bowel diverticulosis. 4.  Minimal free fluid and fat stranding adjacent to the duodenum. This   finding may be secondary to small abdominal ascites versus duodenitis. 5.  Central hypoattenuation located within distal left external iliac vein   and left common femoral vein. Ultrasound may be helpful to exclude   possibility of deep vein thrombosis. XR CHEST PORTABLE   Final Result   No acute cardiopulmonary process.           Assessment    Principal Problem:    Acute metabolic encephalopathy  Active Problems:    Stage IV breast cancer in female [facility])    Hypokalemia    CKD (chronic kidney disease) stage 3, GFR 30-59 ml/min    COPD (chronic obstructive pulmonary disease) (HCC)    Acute respiratory failure with hypoxia (HCC)    Severe protein-calorie malnutrition (HCC)    Type 2 diabetes mellitus (HCC)    Neuropathy Subdural fluid collection    Elevated troponin    Hypernatremia    Colitis    Essential hypertension    Chronic anemia    Lactic acidosis    Dysphagia    Sepsis present on admission  Resolved Problems:    * No resolved hospital problems.  *      Plan:     71-year-old female history of stage IV metastatic breast CA, CKD 3, COPD, diabetes admitted with encephalopathy and hypernatremia     RRT respiratory failure-- BIpap  - discussed w team  CXR reviewed-- NAD  IV fluids D5W with 20 of K fluids adjusted-- DC  IV KCl additional replacement ordered  IV antibiotics for sepsis 2/2 colitis  speech eval completed video swallow -->  dysphagiia diet  Urine culture NGTD  Blood cultures NGTD  proCalcitonin elevated 0.49  Monitor labs closely  Medications for other co morbidities cont as appropriate w dosage adjustments as necessary   Minimally elevated troponin-suspect secondary to renal dysfunction in the context of dehydration possible infection-no symptoms of ACS-no plans for further cardiac testing  PT/OT  DVT PPx  DC planning      Palliative care Consult appreciated   DNR limited  Hospice cs  Home w hospice    Electronically signed by Kwan Sparks MD on 2/17/2021 at 7:44 AM

## 2021-02-17 NOTE — ACP (ADVANCE CARE PLANNING)
ADVANCED CARE PLANNING    Patient Name: Sushant Betancourt       YOB: 1940              MRN:    [id number]  Admission Date:  2/13/2021  2:28 PM      Active Diagnoses:  Principal Problem:    Acute metabolic encephalopathy  Active Problems:    Stage IV breast cancer in female [facility])    Hypokalemia    CKD (chronic kidney disease) stage 3, GFR 30-59 ml/min    COPD (chronic obstructive pulmonary disease) (HCC)    Severe protein-calorie malnutrition (HCC)    Type 2 diabetes mellitus (HCC)    Neuropathy    Subdural fluid collection    Elevated troponin    Hypernatremia    Colitis    Essential hypertension    Chronic anemia    Lactic acidosis    Dysphagia    Sepsis present on admission  Resolved Problems:    * No resolved hospital problems. *      These active diagnoses are of sufficient risk that focused discussion on advanced care planning is indicated in order to allow the patient to thoughtfully consider personal goals of care; and, if situations arise that prevent the patient to personally give input, to ensure appropriate representation of their personal desire for different levels and levels of care. Person(s) present in discussion:   Piero Emerson DO, Family members: Prasanna aMrsh. Discussion: I reviewed her admission for the above diagnoses and her desires for ongoing aggresive care, including potential intubation and mechanical ventilation; also discussed who would speak on her behalf should she be unable to do so and discussed what conversations she has had with her family so they understand her desires if such a situation occurred now or in the future. Palliative care following. PLAN: Spoke with daughter Prasanna Marsh, about how she would like to proceed with Qiana's care after RRT called this am. She was currently at [facility] status, which she could technically be intubated. Venessa Styles states Polo Painter does not want to be intubated.  She requested code status to be changed and for

## 2021-02-17 NOTE — PROGRESS NOTES
LSW met at bedside with pt and her daughter to offer psychosocial support as they await hospice to see them. Daughter plans to take pt home.

## 2021-02-17 NOTE — PROGRESS NOTES
Scripts for Morphine, Ativan dropped at outpatient pharmacy to be filled and sent home with patient. Gonzalez updated on scripts.

## 2021-02-19 NOTE — DISCHARGE SUMMARY
Physician Discharge Summary     Patient ID:  Adrienne Mendez  [id number]  79 y.o.  1940    Admit date: 2/13/2021    Discharge date and time:  2/17/2021    Discharge Diagnoses: Principal Problem:    Acute metabolic encephalopathy  Active Problems:    Stage IV breast cancer in female [facility])    Hypokalemia    CKD (chronic kidney disease) stage 3, GFR 30-59 ml/min    COPD (chronic obstructive pulmonary disease) (HCC)    Acute respiratory failure with hypoxia (HCC)    Severe protein-calorie malnutrition (HCC)    Type 2 diabetes mellitus (HCC)    Neuropathy    Subdural fluid collection    Elevated troponin    Hypernatremia    Colitis    Essential hypertension    Chronic anemia    Lactic acidosis    Dysphagia    Sepsis present on admission  Resolved Problems:    * No resolved hospital problems.  *      Consults: IP CONSULT TO INTERNAL MEDICINE  IP CONSULT TO NEUROSURGERY  IP CONSULT TO SOCIAL WORK  IP CONSULT TO CASE MANAGEMENT  IP CONSULT TO CARDIOLOGY  IP CONSULT TO PALLIATIVE CARE  IP CONSULT TO HOSPICE    Procedures: See below    Hospital Course: 69-year-old female history of stage IV metastatic breast CA, CKD 3, COPD, diabetes admitted with encephalopathy and hypernatremia     RRT respiratory failure-- BIpap  - discussed w team  CXR reviewed-- NAD  IV fluids D5W with 20 of K fluids adjusted-- DC  IV KCl additional replacement ordered  IV antibiotics for sepsis 2/2 colitis  speech eval completed video swallow -->  dysphagiia diet  Urine culture NGTD  Blood cultures NGTD  proCalcitonin elevated 0.49  Monitor labs closely  Medications for other co morbidities cont as appropriate w dosage adjustments as necessary   Minimally elevated troponin-suspect secondary to renal dysfunction in the context of dehydration possible infection-no symptoms of ACS-no plans for further cardiac testing  PT/OT  DVT PPx  DC planning      Palliative care Consult appreciated   DNR limited  Hospice cs  Home w hospice    Discharge Exam:  See progress note from today    Condition:  Stable Poor    Disposition: home    Patient Instructions:   Discharge Medication List as of 2/17/2021  6:28 PM      START taking these medications    Details   morphine sulfate 20 MG/ML concentrated oral solution Take 0.25 mLs by mouth every 2 hours as needed (For patient comfort) for up to 10 days. , Disp-30 mL, R-0Print      haloperidol (HALDOL) 2 MG/ML solution Take 0.5 mLs by mouth every 6 hours as needed (agitation), Disp-60 mL, R-0Normal      LORazepam (ATIVAN) 0.5 MG tablet Take 1 tablet by mouth every 8 hours as needed for Anxiety for up to 30 days. , Disp-30 tablet, R-0Print      Hyoscyamine Sulfate SL (LEVSIN/SL) 0.125 MG SUBL Place 1 tablet under the tongue every 3-4 hours as needed (secretions), Disp-60 each, R-0Normal         CONTINUE these medications which have NOT CHANGED    Details   palbociclib (IBRANCE) 125 MG capsule Take 125 mg by mouth dailyHistorical Med      prednisoLONE acetate (PRED FORTE) 1 % ophthalmic suspension Place 1 drop into both eyes dailyHistorical Med      ketorolac (ACULAR) 0.5 % ophthalmic solution Place 1 drop into both eyes dailyHistorical Med      fluticasone-vilanterol (BREO ELLIPTA) 100-25 MCG/INH AEPB inhaler Inhale 1 puff into the lungs daily, Disp-1 each, R-12Normal      potassium chloride (MICRO-K) 10 MEQ extended release capsule Take 20 mEq by mouth 2 times dailyHistorical Med      albuterol (PROVENTIL) (2.5 MG/3ML) 0.083% nebulizer solution Take 3 mLs by nebulization every 6 hours as needed for Wheezing, Disp-120 each, R-0Normal      dexamethasone (DECADRON) 0.1 % ophthalmic solution 1 dropHistorical Med      clobetasol prop emollient base 0.05 % CREA Historical Med      !! gabapentin (NEURONTIN) 400 MG capsule nightly, R-0Historical Med      ofloxacin (OCUFLOX) 0.3 % solution Place 1 drop into both eyes Historical Med      !! gabapentin (NEURONTIN) 300 MG capsule Take 300 mg by mouth 2 times daily.       albuterol (PROVENTIL

## 2021-02-24 LAB
EKG ATRIAL RATE: 78 BPM
EKG Q-T INTERVAL: 374 MS
EKG QRS DURATION: 66 MS
EKG QTC CALCULATION (BAZETT): 503 MS
EKG R AXIS: 36 DEGREES
EKG T AXIS: 175 DEGREES
EKG VENTRICULAR RATE: 109 BPM

## 2021-03-11 PROBLEM — G93.40 ACUTE ENCEPHALOPATHY: Status: ACTIVE | Noted: 2021-03-11

## 2022-05-09 NOTE — CARE COORDINATION
Met with pt and daughter at bedside to verify discharge plan. I explained my concern r/t x2 Max assist per therapy's most recent note from (1/26); I did call for stat updates however, may not occur prior to discharge today; pt and daughter understand. Daughter did explain that several family members do reside on the same street and that they all pull together to assist pt and keep her at home rather than SNF. Per both, plan is home with [facility], no preference however, request to check [address] unable to accept d/t staffing. I made referral to [place] with [facility], and they have accepted; [facility] order obtained. This CM notified [facility] for resumption of aid services via pt's  Nivia Mccarty phone [phone] whom was unavailable left VM for Kulwant's supervisor Mirta Rivas with request for her to please call back today. The Plan for Transition of Care is related to the following treatment goals: medical stability    The Patient and/or patient representative Tamia Tierney was provided with a choice of provider and agrees   with the discharge plan. [x] Yes [] No    Freedom of choice list was provided with basic dialogue that supports the patient's individualized plan of care/goals, treatment preferences and shares the quality data associated with the providers.  [x] Yes [] No Fair (50-75%)

## 2024-05-24 NOTE — ED NOTES
Bed: 16  Expected date:   Expected time:   Means of arrival:   Comments:  [facility] Herchel Oppenheim, JULISSA  01/24/21 1346 Respiratory Care is following the rib fracture order set. Patient's position when testing was done was fowlers.  A Negative Inspiratory Force (NIF) was performed with patient achieving a NIF of >-40 cm H2O. The NIF was greater than 25 cm H2O. A Forced Vital Capacity (FVC) was obtained with patient achieving an FVC of 2.53 liters. The patient's calculated ideal body weight, (IBW) is  78 kg. 0.020 liters/kg of the patient's IBW is 1.56 liters. The patient's FVC was greater than 0.020 liters/kg of IBW. Based on the spirometry measurement alone, patient does not meet ICU admission criteria. Previous FVC was 2.55 liters and previous NIF was >-40 cm H2O.  Patient's NIF and FVC show no change from previous screening(s).

## (undated) DEVICE — CLOTH SURG PREP PREOPERATIVE CHLORHEXIDINE GLUC 2% READYPREP

## (undated) DEVICE — PACK,LAPAROTOMY,NO GOWNS: Brand: MEDLINE

## (undated) DEVICE — PATIENT RETURN ELECTRODE, SINGLE-USE, CONTACT QUALITY MONITORING, ADULT, WITH 9FT CORD, FOR PATIENTS WEIGING OVER 33LBS. (15KG): Brand: MEGADYNE

## (undated) DEVICE — DRAPE C ARM UNIV W41XL74IN CLR PLAS XR VELC CLSR POLY STRP

## (undated) DEVICE — 18GA (1.3MM OD: 1.0MM ID) X 7CM INTRODUCER18GA X 7CM NEEDLENEEDLE: Brand: INTRODUCER NEEDLEINTRODUCER NEEDLE

## (undated) DEVICE — COUNTER NDL 30 COUNT DBL MAG

## (undated) DEVICE — MICROPUNCTURE INTRODUCER SET SILHOUETTE TRANSITIONLESS WITH STAINLESS STEEL WIRE GUIDE: Brand: MICROPUNCTURE

## (undated) DEVICE — GAUZE,SPONGE,AVANT,4"X4",4PLY,STRL,10/TR: Brand: MEDLINE

## (undated) DEVICE — SET SURG INSTR MINI VASC

## (undated) DEVICE — GLOVE ORANGE PI 7 1/2   MSG9075

## (undated) DEVICE — GLOVE ORANGE PI 8   MSG9080

## (undated) DEVICE — MARKER,SKIN,WI/RULER AND LABELS: Brand: MEDLINE

## (undated) DEVICE — GOWN,AURORA,NONREINF,RAGLAN,L,STERILE: Brand: MEDLINE

## (undated) DEVICE — NEEDLE HYPO 25GA L1.5IN BLU POLYPR HUB S STL REG BVL STR

## (undated) DEVICE — SOLUTION IV 100ML 0.9% SOD CHL PLAS CONT USP VIAFLX 1 PER

## (undated) DEVICE — SYRINGE MED 10ML POLYPR LUERSLIP TIP FLAT TOP W/O SFTY DISP

## (undated) DEVICE — Z DISCONTINUED APPLICATOR SURG PREP 0.35OZ 2% CHG 70% ISO ALC W/ HI LT

## (undated) DEVICE — DOUBLE BASIN SET: Brand: MEDLINE INDUSTRIES, INC.

## (undated) DEVICE — COVER,LIGHT HANDLE,FLX,2/PK: Brand: MEDLINE INDUSTRIES, INC.

## (undated) DEVICE — BENTSON WIRE GUIDE 20CM DISTAL FLEXIBILITY WITH SOFTENED TIP: Brand: BENTSON

## (undated) DEVICE — INTENDED FOR TISSUE SEPARATION, AND OTHER PROCEDURES THAT REQUIRE A SHARP SURGICAL BLADE TO PUNCTURE OR CUT.: Brand: BARD-PARKER ® STAINLESS STEEL BLADES

## (undated) DEVICE — DECANTER BAG 9": Brand: MEDLINE INDUSTRIES, INC.

## (undated) DEVICE — DRAPE,REIN 53X77,STERILE: Brand: MEDLINE

## (undated) DEVICE — TOWEL,OR,DSP,ST,BLUE,STD,6/PK,12PK/CS: Brand: MEDLINE

## (undated) DEVICE — DRESSING TRNSPAR W4XL55IN ACRYL SUP FLM W ADH WTRPRF OPSITE

## (undated) DEVICE — SYRINGE MED 20ML STD CLR PLAS LUERSLIP TIP N CTRL DISP

## (undated) DEVICE — GAUZE,SPONGE,4"X4",16PLY,XRAY,STRL,LF: Brand: MEDLINE

## (undated) DEVICE — GOWN,SIRUS,FABRNF,XL,20/CS: Brand: MEDLINE

## (undated) DEVICE — GLOVE SURG SZ 75 L12IN FNGR THK94MIL TRNSLUC YEL LTX

## (undated) DEVICE — LABEL MED 4 IN SURG PANEL W/ PEN STRL

## (undated) DEVICE — SYRINGE MED 10ML SLIP TIP BLNT FILL AND LUERLOCK DISP

## (undated) DEVICE — GLOVE SURG SZ 7.5 L11.73IN FNGR THK9.8MIL STRW LTX POLYMER

## (undated) DEVICE — MICROPUNCTURE INTRODUCER SET SILHOUETTE TRANSITIONLESS PUSH-PLUS DESIGN - STIFFENED CANNULA WITH STAINLESS STEEL WIRE GUIDE: Brand: MICROPUNCTURE